# Patient Record
Sex: FEMALE | Race: WHITE | NOT HISPANIC OR LATINO | Employment: UNEMPLOYED | URBAN - METROPOLITAN AREA
[De-identification: names, ages, dates, MRNs, and addresses within clinical notes are randomized per-mention and may not be internally consistent; named-entity substitution may affect disease eponyms.]

---

## 2020-02-04 PROBLEM — S00.12XA CONTUSION OF LEFT PERIOCULAR REGION: Status: ACTIVE | Noted: 2020-02-04

## 2020-02-04 PROBLEM — F90.9 ATTENTION DEFICIT HYPERACTIVITY DISORDER (ADHD): Status: ACTIVE | Noted: 2020-02-04

## 2020-02-04 PROBLEM — F41.9 ANXIETY: Status: ACTIVE | Noted: 2020-02-04

## 2020-02-04 PROBLEM — E03.9 HYPOTHYROIDISM: Status: ACTIVE | Noted: 2020-02-04

## 2020-02-04 PROBLEM — G43.909 MIGRAINE WITHOUT STATUS MIGRAINOSUS, NOT INTRACTABLE: Status: ACTIVE | Noted: 2020-02-04

## 2020-02-04 PROBLEM — R25.1 TREMORS OF NERVOUS SYSTEM: Status: ACTIVE | Noted: 2020-02-04

## 2020-02-04 PROBLEM — F32.A DEPRESSION: Status: ACTIVE | Noted: 2020-02-04

## 2021-02-25 PROBLEM — R53.83 FATIGUE: Chronic | Status: ACTIVE | Noted: 2021-02-25

## 2021-02-25 PROBLEM — R53.1 GENERALIZED WEAKNESS: Status: ACTIVE | Noted: 2021-02-25

## 2021-02-25 PROBLEM — R53.83 FATIGUE: Status: ACTIVE | Noted: 2021-02-25

## 2021-07-12 ENCOUNTER — PATIENT OUTREACH (OUTPATIENT)
Dept: ADMINISTRATIVE | Facility: HOSPITAL | Age: 46
End: 2021-07-12

## 2021-10-14 ENCOUNTER — PATIENT OUTREACH (OUTPATIENT)
Dept: ADMINISTRATIVE | Facility: HOSPITAL | Age: 46
End: 2021-10-14

## 2021-10-28 PROBLEM — M05.742 RHEUMATOID ARTHRITIS INVOLVING BOTH HANDS WITH POSITIVE RHEUMATOID FACTOR: Status: ACTIVE | Noted: 2018-02-21

## 2021-10-28 PROBLEM — S22.081A T12 BURST FRACTURE: Status: ACTIVE | Noted: 2018-06-20

## 2021-10-28 PROBLEM — M05.741 RHEUMATOID ARTHRITIS INVOLVING BOTH HANDS WITH POSITIVE RHEUMATOID FACTOR: Status: ACTIVE | Noted: 2018-02-21

## 2022-04-28 PROBLEM — G25.81 RESTLESS LEGS: Status: ACTIVE | Noted: 2022-02-08

## 2022-04-28 PROBLEM — Z98.84 HISTORY OF BARIATRIC SURGERY: Status: ACTIVE | Noted: 2022-02-08

## 2022-04-28 PROBLEM — E88.819 INSULIN RESISTANCE: Status: ACTIVE | Noted: 2022-03-16

## 2022-04-28 PROBLEM — F50.81 BINGE EATING DISORDER: Status: ACTIVE | Noted: 2022-02-22

## 2022-04-28 PROBLEM — F52.0 LACK OF LIBIDO: Status: ACTIVE | Noted: 2022-03-16

## 2022-04-28 PROBLEM — N95.1 MENOPAUSAL FLUSHING: Status: ACTIVE | Noted: 2022-02-08

## 2022-04-28 PROBLEM — E61.1 IRON DEFICIENCY: Status: ACTIVE | Noted: 2022-02-08

## 2022-04-28 PROBLEM — N20.0 KIDNEY STONE: Status: ACTIVE | Noted: 2022-02-08

## 2022-04-28 PROBLEM — E53.8 VITAMIN B12 DEFICIENCY (NON ANEMIC): Status: ACTIVE | Noted: 2022-02-08

## 2022-04-28 PROBLEM — I10 ESSENTIAL HYPERTENSION: Status: ACTIVE | Noted: 2022-02-22

## 2022-04-28 PROBLEM — G47.00 INSOMNIA DISORDER RELATED TO KNOWN ORGANIC FACTOR: Status: ACTIVE | Noted: 2022-02-08

## 2022-04-28 PROBLEM — R51.9 HEADACHE: Status: ACTIVE | Noted: 2022-02-22

## 2022-04-28 PROBLEM — F50.819 BINGE EATING DISORDER: Status: ACTIVE | Noted: 2022-02-22

## 2022-04-29 PROBLEM — R20.2 NUMBNESS AND TINGLING: Status: ACTIVE | Noted: 2022-04-29

## 2022-04-29 PROBLEM — M54.50 LOW BACK PAIN RADIATING TO BOTH LEGS: Status: ACTIVE | Noted: 2022-04-29

## 2022-04-29 PROBLEM — R20.0 NUMBNESS AND TINGLING: Status: ACTIVE | Noted: 2022-04-29

## 2022-04-29 PROBLEM — M79.604 LOW BACK PAIN RADIATING TO BOTH LEGS: Status: ACTIVE | Noted: 2022-04-29

## 2022-04-29 PROBLEM — M79.605 LOW BACK PAIN RADIATING TO BOTH LEGS: Status: ACTIVE | Noted: 2022-04-29

## 2022-07-27 PROBLEM — M51.36 DEGENERATIVE DISC DISEASE, LUMBAR: Status: ACTIVE | Noted: 2022-03-16

## 2022-07-27 PROBLEM — M51.369 DEGENERATIVE DISC DISEASE, LUMBAR: Status: ACTIVE | Noted: 2022-03-16

## 2022-08-29 DIAGNOSIS — Z12.31 OTHER SCREENING MAMMOGRAM: ICD-10-CM

## 2022-08-31 ENCOUNTER — PATIENT MESSAGE (OUTPATIENT)
Dept: ADMINISTRATIVE | Facility: HOSPITAL | Age: 47
End: 2022-08-31
Payer: MEDICAID

## 2023-10-11 ENCOUNTER — PATIENT OUTREACH (OUTPATIENT)
Dept: ADMINISTRATIVE | Facility: HOSPITAL | Age: 48
End: 2023-10-11
Payer: MEDICAID

## 2023-10-11 ENCOUNTER — PATIENT MESSAGE (OUTPATIENT)
Dept: ADMINISTRATIVE | Facility: HOSPITAL | Age: 48
End: 2023-10-11
Payer: MEDICAID

## 2024-09-23 ENCOUNTER — TELEPHONE (OUTPATIENT)
Dept: OTOLARYNGOLOGY | Facility: CLINIC | Age: 49
End: 2024-09-23
Payer: MEDICAID

## 2024-09-23 NOTE — TELEPHONE ENCOUNTER
----- Message from Nickie Stockton sent at 9/23/2024  4:05 PM CDT -----  Regarding: Appt  Contact: Pt  [phone]  Pt sent a portal request for a appt for plastic surgery on loose skin lift please call

## 2024-09-25 ENCOUNTER — PATIENT MESSAGE (OUTPATIENT)
Dept: ADMINISTRATIVE | Facility: HOSPITAL | Age: 49
End: 2024-09-25
Payer: MEDICAID

## 2024-09-28 ENCOUNTER — PATIENT MESSAGE (OUTPATIENT)
Dept: ADMINISTRATIVE | Facility: OTHER | Age: 49
End: 2024-09-28
Payer: MEDICAID

## 2024-11-06 ENCOUNTER — PATIENT MESSAGE (OUTPATIENT)
Dept: ADMINISTRATIVE | Facility: OTHER | Age: 49
End: 2024-11-06
Payer: MEDICAID

## 2024-11-21 ENCOUNTER — PATIENT MESSAGE (OUTPATIENT)
Dept: ADMINISTRATIVE | Facility: OTHER | Age: 49
End: 2024-11-21
Payer: MEDICAID

## 2024-11-24 PROBLEM — R10.9 ABDOMINAL PAIN: Status: ACTIVE | Noted: 2024-11-24

## 2024-11-24 PROBLEM — E55.9 VITAMIN D DEFICIENCY: Status: ACTIVE | Noted: 2024-11-24

## 2024-11-24 PROBLEM — R11.2 NAUSEA AND VOMITING: Status: ACTIVE | Noted: 2024-11-24

## 2024-11-24 PROBLEM — E16.2 HYPOGLYCEMIA: Status: ACTIVE | Noted: 2024-11-24

## 2024-11-24 PROBLEM — R10.11 RIGHT UPPER QUADRANT ABDOMINAL PAIN: Status: ACTIVE | Noted: 2024-11-24

## 2024-11-24 PROBLEM — G89.29 CHRONIC LOW BACK PAIN: Status: ACTIVE | Noted: 2022-04-29

## 2024-11-24 PROBLEM — R74.01 TRANSAMINITIS: Status: ACTIVE | Noted: 2024-11-24

## 2024-11-25 PROBLEM — E11.9 TYPE 2 DIABETES MELLITUS: Status: ACTIVE | Noted: 2024-11-25

## 2024-11-25 PROBLEM — B96.20 E-COLI UTI: Status: ACTIVE | Noted: 2024-11-25

## 2024-11-25 PROBLEM — N39.0 E-COLI UTI: Status: ACTIVE | Noted: 2024-11-25

## 2024-11-25 PROBLEM — N30.00 ACUTE CYSTITIS WITHOUT HEMATURIA: Status: ACTIVE | Noted: 2024-11-25

## 2024-11-26 PROBLEM — E86.0 DEHYDRATION: Status: ACTIVE | Noted: 2024-11-26

## 2024-11-28 PROBLEM — R07.89 OTHER CHEST PAIN: Status: ACTIVE | Noted: 2024-11-28

## 2024-12-02 ENCOUNTER — TELEPHONE (OUTPATIENT)
Dept: GASTROENTEROLOGY | Facility: CLINIC | Age: 49
End: 2024-12-02
Payer: COMMERCIAL

## 2024-12-02 ENCOUNTER — TELEPHONE (OUTPATIENT)
Dept: PULMONOLOGY | Facility: CLINIC | Age: 49
End: 2024-12-02
Payer: COMMERCIAL

## 2024-12-02 NOTE — TELEPHONE ENCOUNTER
Spoke with patient and rescheduled appointment and changed for an virtual visit for 12/4/24. Patient stated she just got out of the hospital and can't make 5 hour drive to hospital.    ----- Message from Chapis sent at 12/2/2024 12:08 PM CST -----  Pt returning call to reschedule yovany     Confirmed patient's contact info below:  Contact Name: Tayla Lambert  Phone Number: [phone]

## 2024-12-02 NOTE — TELEPHONE ENCOUNTER
Spoke with patient and notified her that [facility] doesn't accept medicaid and will referr her to [facility] that accepts medicaid.

## 2024-12-02 NOTE — TELEPHONE ENCOUNTER
Spoke to pt about appt time pt lives 4 hrs away rescheduled for later in day pt accepted date and time

## 2024-12-02 NOTE — TELEPHONE ENCOUNTER
Left a voicemail for patient about rescheduling appointment. Provided [facility] call back number.     ----- Message from Med Assistant Weiss sent at 12/2/2024 10:17 AM CST -----  Regarding: FW: maria luisa villalba  Contact: @ [phone]    ----- Message -----  From: Chapis Campbell  Sent: 12/2/2024   8:59 AM CST  To: [facility] Clinical Staff  Subject: maria luisa appt                                       Pt is calling to reschedule appointment on 12/04 to a later time she just got out of the hospital and that is a 5 hour drive ...no available dates Pleaes call and adv @ [phone]

## 2024-12-02 NOTE — TELEPHONE ENCOUNTER
----- Message from Lylette sent at 12/2/2024  8:28 AM CST -----  Regarding: Appt  Contact: [phone]  Pt calling regarding appt on 12/3 @8am. Pt states she lives 4 hours away and would like to see if appt time can be pushed back later that day. Would have to leave home at 4am. Please call  [phone]

## 2024-12-03 ENCOUNTER — LAB VISIT (OUTPATIENT)
Dept: LAB | Facility: HOSPITAL | Age: 49
End: 2024-12-03
Attending: INTERNAL MEDICINE
Payer: COMMERCIAL

## 2024-12-03 ENCOUNTER — OFFICE VISIT (OUTPATIENT)
Dept: PULMONOLOGY | Facility: CLINIC | Age: 49
End: 2024-12-03
Payer: COMMERCIAL

## 2024-12-03 VITALS
HEIGHT: 66 IN | SYSTOLIC BLOOD PRESSURE: 122 MMHG | WEIGHT: 157.88 LBS | DIASTOLIC BLOOD PRESSURE: 64 MMHG | OXYGEN SATURATION: 100 % | RESPIRATION RATE: 20 BRPM | HEART RATE: 79 BPM | BODY MASS INDEX: 25.37 KG/M2

## 2024-12-03 DIAGNOSIS — J30.9 ALLERGIC RHINITIS, UNSPECIFIED SEASONALITY, UNSPECIFIED TRIGGER: ICD-10-CM

## 2024-12-03 DIAGNOSIS — J45.30 MILD PERSISTENT ASTHMA, UNCOMPLICATED: ICD-10-CM

## 2024-12-03 DIAGNOSIS — J45.30 MILD PERSISTENT ASTHMA, UNCOMPLICATED: Primary | ICD-10-CM

## 2024-12-03 DIAGNOSIS — M05.9 RHEUMATOID ARTHRITIS, SEROPOSITIVE: ICD-10-CM

## 2024-12-03 DIAGNOSIS — R91.1 LUNG NODULE: ICD-10-CM

## 2024-12-03 LAB — IGE SERPL-ACNC: <35 IU/ML (ref 0–100)

## 2024-12-03 PROCEDURE — 36415 COLL VENOUS BLD VENIPUNCTURE: CPT | Performed by: INTERNAL MEDICINE

## 2024-12-03 PROCEDURE — 1111F DSCHRG MED/CURRENT MED MERGE: CPT | Mod: CPTII,S$GLB,, | Performed by: INTERNAL MEDICINE

## 2024-12-03 PROCEDURE — 3078F DIAST BP <80 MM HG: CPT | Mod: CPTII,S$GLB,, | Performed by: INTERNAL MEDICINE

## 2024-12-03 PROCEDURE — G0009 ADMIN PNEUMOCOCCAL VACCINE: HCPCS | Mod: S$GLB,,, | Performed by: INTERNAL MEDICINE

## 2024-12-03 PROCEDURE — 90677 PCV20 VACCINE IM: CPT | Mod: S$GLB,,, | Performed by: INTERNAL MEDICINE

## 2024-12-03 PROCEDURE — 4010F ACE/ARB THERAPY RXD/TAKEN: CPT | Mod: CPTII,S$GLB,, | Performed by: INTERNAL MEDICINE

## 2024-12-03 PROCEDURE — 99204 OFFICE O/P NEW MOD 45 MIN: CPT | Mod: S$GLB,,, | Performed by: INTERNAL MEDICINE

## 2024-12-03 PROCEDURE — 3044F HG A1C LEVEL LT 7.0%: CPT | Mod: CPTII,S$GLB,, | Performed by: INTERNAL MEDICINE

## 2024-12-03 PROCEDURE — 3061F NEG MICROALBUMINURIA REV: CPT | Mod: CPTII,S$GLB,, | Performed by: INTERNAL MEDICINE

## 2024-12-03 PROCEDURE — 3008F BODY MASS INDEX DOCD: CPT | Mod: CPTII,S$GLB,, | Performed by: INTERNAL MEDICINE

## 2024-12-03 PROCEDURE — 82785 ASSAY OF IGE: CPT | Performed by: INTERNAL MEDICINE

## 2024-12-03 PROCEDURE — 3074F SYST BP LT 130 MM HG: CPT | Mod: CPTII,S$GLB,, | Performed by: INTERNAL MEDICINE

## 2024-12-03 PROCEDURE — 1159F MED LIST DOCD IN RCRD: CPT | Mod: CPTII,S$GLB,, | Performed by: INTERNAL MEDICINE

## 2024-12-03 PROCEDURE — 99999 PR PBB SHADOW E&M-EST. PATIENT-LVL V: CPT | Mod: PBBFAC,,, | Performed by: INTERNAL MEDICINE

## 2024-12-03 PROCEDURE — 3066F NEPHROPATHY DOC TX: CPT | Mod: CPTII,S$GLB,, | Performed by: INTERNAL MEDICINE

## 2024-12-03 RX ORDER — PERMETHRIN 50 MG/G
1 CREAM TOPICAL ONCE
Status: ON HOLD | COMMUNITY
Start: 2024-09-03

## 2024-12-03 RX ORDER — DICYCLOMINE HYDROCHLORIDE 20 MG/1
20 TABLET ORAL
Status: ON HOLD | COMMUNITY
Start: 2024-11-15

## 2024-12-03 RX ORDER — TRAMADOL HYDROCHLORIDE 50 MG/1
50 TABLET ORAL EVERY 8 HOURS PRN
Status: ON HOLD | COMMUNITY
Start: 2024-09-27

## 2024-12-03 RX ORDER — ALBUTEROL SULFATE 90 UG/1
2 INHALANT RESPIRATORY (INHALATION) EVERY 6 HOURS PRN
Qty: 18 G | Refills: 5 | Status: ON HOLD | OUTPATIENT
Start: 2024-12-03

## 2024-12-03 RX ORDER — CETIRIZINE HYDROCHLORIDE 10 MG/1
10 TABLET ORAL DAILY
Qty: 30 TABLET | Refills: 2 | Status: ON HOLD | OUTPATIENT
Start: 2024-12-03 | End: 2025-03-03

## 2024-12-03 RX ORDER — AZELASTINE HCL 205.5 UG/1
1 SPRAY NASAL DAILY
Qty: 30 ML | Refills: 2 | Status: ON HOLD | OUTPATIENT
Start: 2024-12-03

## 2024-12-03 RX ORDER — FLUTICASONE PROPIONATE 50 MCG
2 SPRAY, SUSPENSION (ML) NASAL DAILY
Qty: 16 G | Refills: 11 | Status: ON HOLD | OUTPATIENT
Start: 2024-12-03

## 2024-12-03 NOTE — TELEPHONE ENCOUNTER
MA called/spoke with patient in regards to scheduling an appointment for her abdominal pain. Offered appointment for today with Dr. Ibarra, patient declined due to having another appointment in [place]. Offered appointment with Dr. Whaley for tomorrow morning, patient accepted/appointment scheduled.

## 2024-12-03 NOTE — PROGRESS NOTES
Initial Outpatient Pulmonary Evaluation       SUBJECTIVE:     Chief Complaint   Patient presents with    Pulmonary Nodules       History of Present Illness:    Patient is a 49 y.o. female presenting for evaluation of 4 mm right lung nodule that was detected on CT abdomen pelvis 11/23/2024 and was deemed to be stable when compared to prior CT abdomen pelvis 11/26/21.    History of asthma on albuterol p.r.n..  Coughing wheezing reported.      History of seropositive rheumatoid arthritis as per chart review.      Only on Neurontin for pain.          Review of Systems   Respiratory:  Positive for cough, wheezing and use of rescue inhaler.    Musculoskeletal:  Positive for arthralgias.       Review of patient's allergies indicates:  No Known Allergies    Current Outpatient Medications   Medication Sig Dispense Refill    dicyclomine (BENTYL) 20 mg tablet Take 20 mg by mouth.      permethrin (ELIMITE) 5 % cream Apply 1 application  topically once.      traMADoL (ULTRAM) 50 mg tablet Take 50 mg by mouth every 8 (eight) hours as needed.      acetaminophen-codeine 300-30mg (TYLENOL #3) 300-30 mg Tab Take 1 tablet by mouth 2 (two) times daily. 40 tablet 0    albuterol (PROVENTIL/VENTOLIN HFA) 90 mcg/actuation inhaler Inhale 2 puffs into the lungs every 6 (six) hours as needed for Wheezing. Rescue 18 g 5    azelastine 205.5 mcg (0.15 %) Spry 1 spray by Nasal route once daily. 30 mL 2    cariprazine (VRAYLAR) 1.5 mg Cap Take 1 capsule (1.5 mg total) by mouth every evening. 30 capsule 1    cetirizine (ZYRTEC) 10 MG tablet Take 1 tablet (10 mg total) by mouth once daily. 30 tablet 2    cyanocobalamin 1,000 mcg/mL injection Inject 1 mL (1,000 mcg total) into the muscle every 30 days. Inject 1 mL into the muscle every month 3 mL 3    diclofenac (VOLTAREN) 75 MG EC tablet Take 75 mg by mouth once daily.      divalproex ER (DEPAKOTE ER) 250 MG 24 hr tablet Take 1 tablet (250 mg total) by  mouth once daily. 30 tablet 0    ergocalciferol (VITAMIN D2) 50,000 unit Cap Take 50,000 Units by mouth every 7 days.      estradioL (ESTRACE) 1 MG tablet Take 1 tablet (1 mg total) by mouth once daily. 30 tablet 2    FLUoxetine 20 MG capsule Take 1 capsule (20 mg total) by mouth once daily. 30 capsule 1    fluticasone propionate (FLONASE) 50 mcg/actuation nasal spray 2 sprays (100 mcg total) by Each Nostril route once daily. 16 g 11    gabapentin (NEURONTIN) 300 MG capsule Take 1 capsule (300 mg total) by mouth 3 (three) times daily. 90 capsule 1    hydrOXYzine (ATARAX) 50 MG tablet Take 1 tablet (50 mg total) by mouth 3 (three) times daily as needed for Anxiety. 90 tablet 2    levothyroxine (SYNTHROID) 125 MCG tablet Take 1 tablet (125 mcg total) by mouth before breakfast. 30 tablet 0    lurasidone (LATUDA) 60 mg Tab tablet Take 1 tablet (60 mg total) by mouth once daily. 30 tablet 1    meclizine (ANTIVERT) 50 MG tablet Take 25 mg by mouth every 8 (eight) hours.      olmesartan (BENICAR) 40 MG tablet Take 1 tablet (40 mg total) by mouth once daily. 90 tablet 3    pantoprazole (PROTONIX) 40 MG tablet Take 1 tablet (40 mg total) by mouth once daily. 30 tablet 2    sucralfate (CARAFATE) 1 gram tablet Take 1 tablet (1 g total) by mouth 4 (four) times daily as needed (indigestion/acid reflux). 120 tablet 3    sumatriptan (IMITREX) 100 MG tablet Take 1 tablet (100 mg total) by mouth every 2 (two) hours as needed for Migraine. Do need exceed 2 tablets in 24 hours.      tamsulosin (FLOMAX) 0.4 mg Cap Take 1 capsule (0.4 mg total) by mouth once daily. 30 capsule 11    temazepam (RESTORIL) 15 mg Cap Take 1 capsule (15 mg total) by mouth nightly as needed (insomnia). 30 capsule 0    tirzepatide (MOUNJARO) 15 mg/0.5 mL PnIj Inject 15 mg into the skin every 7 days. 2 mL 5    tiZANidine (ZANAFLEX) 4 MG tablet Take 1 tablet (4 mg total) by mouth every evening. 30 tablet 0    topiramate (TOPAMAX) 200 MG Tab Take 1 tablet (200 mg  total) by mouth 2 (two) times daily. 180 tablet 1    triamcinolone acetonide 0.1% (KENALOG) 0.1 % ointment Apply topically 2 (two) times daily. for 14 days 80 g 1    TRINTELLIX 20 mg Tab Take 1 tablet (20 mg total) by mouth every evening. 30 tablet 2    valACYclovir (VALTREX) 1000 MG tablet Take 1 tablet (1,000 mg total) by mouth 2 (two) times daily. For 5 days with outbreaks. 60 tablet 2    VYVANSE 70 mg capsule Take 1 capsule (70 mg total) by mouth every morning. 30 capsule 0    zinc oxide 10 % Oint Apply 1 Application topically 2 (two) times a day.       No current facility-administered medications for this visit.       Past Medical History:   Diagnosis Date    ADHD (attention deficit hyperactivity disorder)     Anxiety     Asthma     Depression     Diabetes mellitus     Encounter for blood transfusion     Essential hypertension 2022    Hypothyroidism 2020    Kidney stone 2022    Migraine without status migrainosus, not intractable 2020    Rheumatoid arthritis involving both hands with positive rheumatoid factor 2018    T12 burst fracture     Tremors of nervous system 2020     Past Surgical History:   Procedure Laterality Date    ABDOMINAL SURGERY      APPENDECTOMY      BACK SURGERY  2018    T11 to L1 PSF w/T12 laminectomy     SECTION      CHOLECYSTECTOMY      GASTRIC BYPASS      HYSTERECTOMY      TONSILLECTOMY       Family History   Problem Relation Name Age of Onset    Diabetes Mother      Tremor Father      Hypertension Father      Breast cancer Maternal Aunt      Breast cancer Maternal Aunt      Breast cancer Maternal Grandmother      Tremor Brother       Social History     Tobacco Use    Smoking status: Never    Smokeless tobacco: Never   Substance Use Topics    Alcohol use: Not Currently    Drug use: Never          OBJECTIVE:     Vital Signs (Most Recent)  Vital Signs  Pulse: 79  Resp: 20  SpO2: 100 %  BP: 122/64  Pain Score:   5  Pain Loc:  "Chest  Height and Weight  Height: 5' 5.5" (166.4 cm)  Weight: 71.6 kg (157 lb 13.6 oz)  BSA (Calculated - sq m): 1.82 sq meters  BMI (Calculated): 25.9  Weight in (lb) to have BMI = 25: 152.2]  Wt Readings from Last 2 Encounters:   12/03/24 71.6 kg (157 lb 13.6 oz)   11/25/24 69.9 kg (154 lb)         Physical Exam:  Physical Exam   Constitutional: She appears well-developed and well-nourished.   Pulmonary/Chest: Effort normal. She has wheezes.   Few scattered and expiratory wheezes       Laboratory  Lab Results   Component Value Date    WBC 4.55 11/29/2024    RBC 3.80 (L) 11/29/2024    HGB 12.2 11/29/2024    HCT 36.9 (L) 11/29/2024    MCV 97 11/29/2024    MCH 32.1 11/29/2024    MCHC 33.1 11/29/2024    RDW 15.0 (H) 11/29/2024     11/29/2024    MPV 9.6 11/29/2024    GRAN 3.9 02/23/2024    GRAN 67.3 02/23/2024    LYMPH 44.2 11/29/2024    LYMPH 2.01 11/29/2024    MONO 8.6 11/29/2024    MONO 0.39 11/29/2024    EOS 2.0 11/29/2024    EOS 0.09 11/29/2024    BASO 0.04 02/23/2024    EOSINOPHIL 2.0 11/24/2024    BASOPHIL 0.9 11/29/2024    BASOPHIL 0.04 11/29/2024       BMP  Lab Results   Component Value Date     11/29/2024    K 3.7 11/29/2024     (H) 11/29/2024    CO2 23 11/29/2024    BUN 10 11/29/2024    CREATININE 0.8 11/29/2024    CALCIUM 8.6 (L) 11/29/2024    ANIONGAP 7 (L) 11/29/2024    ESTGFRAFRICA >60.0 01/05/2022    EGFRNONAA >60.0 01/05/2022    AST 92 (H) 11/29/2024     (H) 11/29/2024    PROT 7.2 02/23/2024       No results found for: "BNP"    Lab Results   Component Value Date    TSH 0.599 11/04/2024       Lab Results   Component Value Date    SEDRATE 10 11/24/2024       No results found for: "CRP"    No results found for: "IGE"    No results found for: "ASPERGILLUS"  No results found for: "AFUMIGATUSCL"     No results found for: "ACE"    Diagnostic Results:  I have personally reviewed today the following studies :       CT abdomen and pelvis November 2024   EXAMINATION:  CT ABDOMEN PELVIS " WITH IV CONTRAST     CLINICAL HISTORY:  Nausea/vomiting;Abdominal pain, acute, nonlocalized;Hx of gastric bypass with recurrenat nausea and vomiting;     TECHNIQUE:  Images from the lung bases to the ischial tuberosities were acquired after administration of 100cc of non-ionic iodine IV contrast material. Sagittal and Coronal multiplanar reconstructions (MPR) were performed and pushed to PACS.     Suboptimal assessment of the GI tract due to lack of p.o. contrast material and luminal distension.     COMPARISON:  Ultrasound abdomen dated 11/08/2024     CT abdomen and pelvis dated 11/26/2021     FINDINGS:  Lower chest: No evidence of pleural effusion or pneumothorax.  Stable right lower lobe subpleural nodule measuring approximately 3 mm (series 2, imaging 32).     Liver: Normal.     Gallbladder and biliary tree: Cholecystectomy.No intrahepatic biliary ductal dilation.  CBD measuring 1.3 cm.     Spleen: A linear lucent line crossing the spleen (series 2, image 38; series 601, image 83).     Pancreas: Normal.     Adrenals: Normal.     Kidneys and ureters: No evidence of nephrolithiasis or hydronephrosis.     Bowel: Redemonstration of several surgical clips and suture material.  No evidence of mechanical bowel obstruction.  Moderate stool within the colon.  Appendix is not distended.     Vessels: No significant atheromatous disease is noted in the aorta and its major collateral branches.     Lymph nodes: No mesenteric, retroperitoneal or pelvic lymphadenopathy is noted.     Peritoneum: No ascites or free air.     Abdominal wall: Prior midline incision.  No evidence of bowel herniation.  Body wall edema.     Reproductive organs: Hysterectomy.  No pelvic masses.     Bladder: Distended with no evidence of intraluminal stones or masses.     Bones: Known L1 and L2 laminectomies with placement of bilateral pedicle screws and rods at T12-L2 across a remote L1 burst fracture; Hardware is intact.  Multilevel vacuum phenomena.   Dextroscoliosis of the lumbar spine.        Impression:     1. A linear lucent line crossing the spleen could be congenital, versus remote infarct or trauma.  Please correlate with physical examination.  2. Otherwise, no CT evidence of acute abdominal abnormality is seen.  3. Known postoperative changes within the abdomen and pelvis.  No evidence of mechanical bowel obstruction.  4. Anasarca.  5. Constipation.  6. Additional findings above.      ASSESSMENT/PLAN:     Mild persistent asthma, uncomplicated  -     Complete PFT with bronchodilator; Future; Expected date: 2025  -     Fraction of  Nitric Oxide; Future; Expected date: 2025  -     IgE; Future; Expected date: 2024  -     albuterol (PROVENTIL/VENTOLIN HFA) 90 mcg/actuation inhaler; Inhale 2 puffs into the lungs every 6 (six) hours as needed for Wheezing. Rescue  Dispense: 18 g; Refill: 5  -     (VFC) PCV20 (Prevnar 20) IM vaccine (>/= 6 wks)    Lung nodule  -     Ambulatory referral/consult to Pulmonology    Allergic rhinitis, unspecified seasonality, unspecified trigger  -     Fraction of  Nitric Oxide; Future; Expected date: 2025  -     IgE; Future; Expected date: 2024  -     cetirizine (ZYRTEC) 10 MG tablet; Take 1 tablet (10 mg total) by mouth once daily.  Dispense: 30 tablet; Refill: 2  -     fluticasone propionate (FLONASE) 50 mcg/actuation nasal spray; 2 sprays (100 mcg total) by Each Nostril route once daily.  Dispense: 16 g; Refill: 11  -     azelastine 205.5 mcg (0.15 %) Spry; 1 spray by Nasal route once daily.  Dispense: 30 mL; Refill: 2    Rheumatoid arthritis, seropositive  -     Ambulatory referral/consult to Rheumatology; Future; Expected date: 12/10/2024  -     Ambulatory referral/consult to Internal Medicine; Future; Expected date: 12/10/2024      Continue albuterol p.r.n.     Check IgE fraction  nitric oxide and PFT with bronchodilator.      Assess for ICS therapy need.      Internal medicine  referral and rheumatology referral.      Lung nodule 4 mm stable since 2021 low risk patient no history of cancer and never smoker at the moment no need for surveillance.    Follow up in about 6 weeks (around 1/14/2025).    This note was prepared using voice recognition system and is likely to have sound alike errors that may have been overlooked even after proof reading.  Please call me with any questions    Discussed diagnosis, its evaluation, treatment and usual course. All questions answered.    Thank you for the courtesy of participating in the care of this patient    Casper Sanchez MD

## 2024-12-04 ENCOUNTER — PATIENT MESSAGE (OUTPATIENT)
Dept: PULMONOLOGY | Facility: CLINIC | Age: 49
End: 2024-12-04
Payer: COMMERCIAL

## 2024-12-04 ENCOUNTER — OFFICE VISIT (OUTPATIENT)
Dept: HEPATOLOGY | Facility: CLINIC | Age: 49
End: 2024-12-04
Payer: MEDICAID

## 2024-12-04 ENCOUNTER — TELEPHONE (OUTPATIENT)
Dept: HEPATOLOGY | Facility: CLINIC | Age: 49
End: 2024-12-04

## 2024-12-04 DIAGNOSIS — R79.89 ABNORMAL LFTS: Primary | ICD-10-CM

## 2024-12-04 DIAGNOSIS — Z98.84 HISTORY OF GASTRIC BYPASS: ICD-10-CM

## 2024-12-04 DIAGNOSIS — E11.9 TYPE 2 DIABETES MELLITUS WITHOUT COMPLICATION, WITHOUT LONG-TERM CURRENT USE OF INSULIN: ICD-10-CM

## 2024-12-04 PROCEDURE — 3066F NEPHROPATHY DOC TX: CPT | Mod: CPTII,95,, | Performed by: INTERNAL MEDICINE

## 2024-12-04 PROCEDURE — 1159F MED LIST DOCD IN RCRD: CPT | Mod: CPTII,95,, | Performed by: INTERNAL MEDICINE

## 2024-12-04 PROCEDURE — 1160F RVW MEDS BY RX/DR IN RCRD: CPT | Mod: CPTII,95,, | Performed by: INTERNAL MEDICINE

## 2024-12-04 PROCEDURE — 3061F NEG MICROALBUMINURIA REV: CPT | Mod: CPTII,95,, | Performed by: INTERNAL MEDICINE

## 2024-12-04 PROCEDURE — 3044F HG A1C LEVEL LT 7.0%: CPT | Mod: CPTII,95,, | Performed by: INTERNAL MEDICINE

## 2024-12-04 PROCEDURE — 1111F DSCHRG MED/CURRENT MED MERGE: CPT | Mod: CPTII,95,, | Performed by: INTERNAL MEDICINE

## 2024-12-04 PROCEDURE — 99205 OFFICE O/P NEW HI 60 MIN: CPT | Mod: 95,,, | Performed by: INTERNAL MEDICINE

## 2024-12-04 PROCEDURE — 4010F ACE/ARB THERAPY RXD/TAKEN: CPT | Mod: CPTII,95,, | Performed by: INTERNAL MEDICINE

## 2024-12-04 NOTE — TELEPHONE ENCOUNTER
Call the patient to let her know that her insurance is out of network.  Pre service number was given .   Pt verbalized understood the message.  Left call back .

## 2024-12-04 NOTE — TELEPHONE ENCOUNTER
----- Message from Alfonso Pete MD sent at 12/4/2024  2:42 PM CST -----  Labs and MRCP please and clinic in 3 months in person
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
## 2024-12-05 ENCOUNTER — TELEPHONE (OUTPATIENT)
Dept: HEPATOLOGY | Facility: CLINIC | Age: 49
End: 2024-12-05
Payer: MEDICAID

## 2024-12-05 NOTE — TELEPHONE ENCOUNTER
Spoke with patient.  Her abdominal pain is increased. She feels like she is about to faint.  Per Dr Pete,   Patient can go to the ER.  Patient agree to go to the ER.

## 2024-12-06 ENCOUNTER — HOSPITAL ENCOUNTER (INPATIENT)
Facility: HOSPITAL | Age: 49
LOS: 3 days | Discharge: HOME | DRG: 638 | End: 2024-12-11
Attending: EMERGENCY MEDICINE | Admitting: INTERNAL MEDICINE
Payer: COMMERCIAL

## 2024-12-06 DIAGNOSIS — Z98.84 HISTORY OF ROUX-EN-Y GASTRIC BYPASS: ICD-10-CM

## 2024-12-06 DIAGNOSIS — K29.70 GASTRITIS, PRESENCE OF BLEEDING UNSPECIFIED, UNSPECIFIED CHRONICITY, UNSPECIFIED GASTRITIS TYPE: ICD-10-CM

## 2024-12-06 DIAGNOSIS — E16.2 HYPOGLYCEMIA: ICD-10-CM

## 2024-12-06 DIAGNOSIS — R53.81 DEBILITY: ICD-10-CM

## 2024-12-06 DIAGNOSIS — R07.9 CHEST PAIN: ICD-10-CM

## 2024-12-06 DIAGNOSIS — R10.11 RIGHT UPPER QUADRANT ABDOMINAL PAIN: Primary | ICD-10-CM

## 2024-12-06 DIAGNOSIS — I95.9 HYPOTENSION, UNSPECIFIED HYPOTENSION TYPE: ICD-10-CM

## 2024-12-06 LAB
ALBUMIN SERPL BCP-MCNC: 3.6 G/DL (ref 3.5–5.2)
ALP SERPL-CCNC: 150 U/L (ref 40–150)
ALT SERPL W/O P-5'-P-CCNC: 72 U/L (ref 10–44)
ANION GAP SERPL CALC-SCNC: 8 MMOL/L (ref 8–16)
AST SERPL-CCNC: 30 U/L (ref 10–40)
B-HCG UR QL: NEGATIVE
BASOPHILS # BLD AUTO: 0.05 K/UL (ref 0–0.2)
BASOPHILS NFR BLD: 1.3 % (ref 0–1.9)
BILIRUB SERPL-MCNC: 0.2 MG/DL (ref 0.1–1)
BILIRUB UR QL STRIP: NEGATIVE
BUN SERPL-MCNC: 13 MG/DL (ref 6–20)
CALCIUM SERPL-MCNC: 9 MG/DL (ref 8.7–10.5)
CHLORIDE SERPL-SCNC: 113 MMOL/L (ref 95–110)
CLARITY UR REFRACT.AUTO: CLEAR
CO2 SERPL-SCNC: 19 MMOL/L (ref 23–29)
COLOR UR AUTO: COLORLESS
CREAT SERPL-MCNC: 0.8 MG/DL (ref 0.5–1.4)
CTP QC/QA: YES
DIFFERENTIAL METHOD BLD: ABNORMAL
EOSINOPHIL # BLD AUTO: 0.2 K/UL (ref 0–0.5)
EOSINOPHIL NFR BLD: 4.9 % (ref 0–8)
ERYTHROCYTE [DISTWIDTH] IN BLOOD BY AUTOMATED COUNT: 14.8 % (ref 11.5–14.5)
EST. GFR  (NO RACE VARIABLE): >60 ML/MIN/1.73 M^2
GLUCOSE SERPL-MCNC: 77 MG/DL (ref 70–110)
GLUCOSE UR QL STRIP: NEGATIVE
HCT VFR BLD AUTO: 39 % (ref 37–48.5)
HGB BLD-MCNC: 12.7 G/DL (ref 12–16)
HGB UR QL STRIP: NEGATIVE
IMM GRANULOCYTES # BLD AUTO: 0.01 K/UL (ref 0–0.04)
IMM GRANULOCYTES NFR BLD AUTO: 0.3 % (ref 0–0.5)
KETONES UR QL STRIP: NEGATIVE
LEUKOCYTE ESTERASE UR QL STRIP: NEGATIVE
LIPASE SERPL-CCNC: 11 U/L (ref 4–60)
LYMPHOCYTES # BLD AUTO: 1.4 K/UL (ref 1–4.8)
LYMPHOCYTES NFR BLD: 35.1 % (ref 18–48)
MAGNESIUM SERPL-MCNC: 2.3 MG/DL (ref 1.6–2.6)
MCH RBC QN AUTO: 32.8 PG (ref 27–31)
MCHC RBC AUTO-ENTMCNC: 32.6 G/DL (ref 32–36)
MCV RBC AUTO: 101 FL (ref 82–98)
MONOCYTES # BLD AUTO: 0.4 K/UL (ref 0.3–1)
MONOCYTES NFR BLD: 10.8 % (ref 4–15)
NEUTROPHILS # BLD AUTO: 1.9 K/UL (ref 1.8–7.7)
NEUTROPHILS NFR BLD: 47.6 % (ref 38–73)
NITRITE UR QL STRIP: NEGATIVE
NRBC BLD-RTO: 0 /100 WBC
PH UR STRIP: 7 [PH] (ref 5–8)
PLATELET # BLD AUTO: 249 K/UL (ref 150–450)
PLATELET BLD QL SMEAR: ABNORMAL
PMV BLD AUTO: 10.6 FL (ref 9.2–12.9)
POCT GLUCOSE: 115 MG/DL (ref 70–110)
POCT GLUCOSE: 46 MG/DL (ref 70–110)
POCT GLUCOSE: 47 MG/DL (ref 70–110)
POCT GLUCOSE: 52 MG/DL (ref 70–110)
POCT GLUCOSE: 58 MG/DL (ref 70–110)
POCT GLUCOSE: 79 MG/DL (ref 70–110)
POTASSIUM SERPL-SCNC: 3.8 MMOL/L (ref 3.5–5.1)
PROT SERPL-MCNC: 7.1 G/DL (ref 6–8.4)
PROT UR QL STRIP: NEGATIVE
RBC # BLD AUTO: 3.87 M/UL (ref 4–5.4)
SODIUM SERPL-SCNC: 140 MMOL/L (ref 136–145)
SP GR UR STRIP: 1 (ref 1–1.03)
TOXIC GRANULES BLD QL SMEAR: PRESENT
TROPONIN I SERPL DL<=0.01 NG/ML-MCNC: <3 NG/L (ref 0–14)
URN SPEC COLLECT METH UR: ABNORMAL
WBC # BLD AUTO: 3.88 K/UL (ref 3.9–12.7)
WBC TOXIC VACUOLES BLD QL SMEAR: PRESENT

## 2024-12-06 PROCEDURE — 25000003 PHARM REV CODE 250: Performed by: EMERGENCY MEDICINE

## 2024-12-06 PROCEDURE — 25500020 PHARM REV CODE 255: Performed by: EMERGENCY MEDICINE

## 2024-12-06 PROCEDURE — 96366 THER/PROPH/DIAG IV INF ADDON: CPT

## 2024-12-06 PROCEDURE — 93010 ELECTROCARDIOGRAM REPORT: CPT | Mod: ,,, | Performed by: INTERNAL MEDICINE

## 2024-12-06 PROCEDURE — 81003 URINALYSIS AUTO W/O SCOPE: CPT | Performed by: EMERGENCY MEDICINE

## 2024-12-06 PROCEDURE — 82962 GLUCOSE BLOOD TEST: CPT

## 2024-12-06 PROCEDURE — G0378 HOSPITAL OBSERVATION PER HR: HCPCS

## 2024-12-06 PROCEDURE — 83690 ASSAY OF LIPASE: CPT | Performed by: EMERGENCY MEDICINE

## 2024-12-06 PROCEDURE — 81025 URINE PREGNANCY TEST: CPT | Performed by: EMERGENCY MEDICINE

## 2024-12-06 PROCEDURE — 63600175 PHARM REV CODE 636 W HCPCS: Performed by: EMERGENCY MEDICINE

## 2024-12-06 PROCEDURE — 84484 ASSAY OF TROPONIN QUANT: CPT | Performed by: EMERGENCY MEDICINE

## 2024-12-06 PROCEDURE — 93005 ELECTROCARDIOGRAM TRACING: CPT

## 2024-12-06 PROCEDURE — 99285 EMERGENCY DEPT VISIT HI MDM: CPT | Mod: 25

## 2024-12-06 PROCEDURE — 96361 HYDRATE IV INFUSION ADD-ON: CPT

## 2024-12-06 PROCEDURE — 85025 COMPLETE CBC W/AUTO DIFF WBC: CPT | Performed by: EMERGENCY MEDICINE

## 2024-12-06 PROCEDURE — 96365 THER/PROPH/DIAG IV INF INIT: CPT

## 2024-12-06 PROCEDURE — 80053 COMPREHEN METABOLIC PANEL: CPT | Performed by: EMERGENCY MEDICINE

## 2024-12-06 PROCEDURE — 83735 ASSAY OF MAGNESIUM: CPT | Performed by: EMERGENCY MEDICINE

## 2024-12-06 PROCEDURE — 96374 THER/PROPH/DIAG INJ IV PUSH: CPT | Mod: 59

## 2024-12-06 RX ORDER — HYDROMORPHONE HYDROCHLORIDE 1 MG/ML
0.5 INJECTION, SOLUTION INTRAMUSCULAR; INTRAVENOUS; SUBCUTANEOUS
Status: COMPLETED | OUTPATIENT
Start: 2024-12-06 | End: 2024-12-06

## 2024-12-06 RX ORDER — IBUPROFEN 200 MG
8 TABLET ORAL
Status: DISCONTINUED | OUTPATIENT
Start: 2024-12-06 | End: 2024-12-09

## 2024-12-06 RX ORDER — DEXTROSE MONOHYDRATE 100 MG/ML
INJECTION, SOLUTION INTRAVENOUS
Status: COMPLETED | OUTPATIENT
Start: 2024-12-06 | End: 2024-12-07

## 2024-12-06 RX ORDER — IBUPROFEN 200 MG
8 TABLET ORAL ONCE
Status: COMPLETED | OUTPATIENT
Start: 2024-12-06 | End: 2024-12-06

## 2024-12-06 RX ADMIN — IOHEXOL 75 ML: 350 INJECTION, SOLUTION INTRAVENOUS at 09:12

## 2024-12-06 RX ADMIN — IOHEXOL 30 ML: 350 INJECTION, SOLUTION INTRAVENOUS at 09:12

## 2024-12-06 RX ADMIN — DEXTROSE MONOHYDRATE: 100 INJECTION, SOLUTION INTRAVENOUS at 11:12

## 2024-12-06 RX ADMIN — DEXTROSE MONOHYDRATE 500 ML: 100 INJECTION, SOLUTION INTRAVENOUS at 07:12

## 2024-12-06 RX ADMIN — SODIUM CHLORIDE 1000 ML: 9 INJECTION, SOLUTION INTRAVENOUS at 06:12

## 2024-12-06 RX ADMIN — Medication 8 G: at 07:12

## 2024-12-06 RX ADMIN — DEXTROSE MONOHYDRATE 250 ML: 100 INJECTION, SOLUTION INTRAVENOUS at 10:12

## 2024-12-06 RX ADMIN — HYDROMORPHONE HYDROCHLORIDE 0.5 MG: 1 INJECTION, SOLUTION INTRAMUSCULAR; INTRAVENOUS; SUBCUTANEOUS at 06:12

## 2024-12-06 RX ADMIN — Medication 8 G: at 10:12

## 2024-12-06 RX ADMIN — SODIUM CHLORIDE 1000 ML: 9 INJECTION, SOLUTION INTRAVENOUS at 11:12

## 2024-12-06 NOTE — ED NOTES
Pt identifiers Tayla DANTE Lambert were checked and are correct  LOC: The patient is awake, alert, aware of environment with an appropriate affect. Oriented x4, speaking appropriately  APPEARANCE: Pt rates right side abd pain an 8/10 , in no acute distress, pt is clean and well groomed, clothing properly fastened  SKIN: Skin warm, dry and intact, normal skin turgor, moist mucus membranes  RESPIRATORY: Airway is open and patent, respirations are spontaneous, even and unlabored, normal effort and rate  CARDIAC: Normal rate and rhythm, no peripheral edema noted, capillary refill < 3 seconds, bilateral radial pulses 2+  ABDOMEN: Soft, nontender, nondistended. Bowel sounds present   NEUROLOGIC: PERRL, facial expression is symmetrical, patient moving all extremities spontaneously, normal sensation in all extremities when touched with a finger.  Follows all commands appropriately  MUSCULOSKELETAL: No obvious deformities.

## 2024-12-06 NOTE — FIRST PROVIDER EVALUATION
Medical screening examination initiated.  I have conducted a focused provider triage encounter, findings are as follows:    Brief history of present illness:  ***    There were no vitals filed for this visit.    Pertinent physical exam:  ***    Brief workup plan:  ***    Preliminary workup initiated; this workup will be continued and followed by the physician or advanced practice provider that is assigned to the patient when roomed.

## 2024-12-06 NOTE — FIRST PROVIDER EVALUATION
Medical screening examination initiated.  I have conducted a focused provider triage encounter, findings are as follows:    Brief history of present illness:  Worsening right sided abdominal pain     There were no vitals filed for this visit.    Pertinent physical exam:  right sided abdominal pain on exam    Brief workup plan:  Abdominal labs and CT    Preliminary workup initiated; this workup will be continued and followed by the physician or advanced practice provider that is assigned to the patient when roomed.

## 2024-12-06 NOTE — ED PROVIDER NOTES
Encounter Date: 2024       History     Chief Complaint   Patient presents with    Abdominal Pain     Sent by primary for right sided abdominal pain, patient says she has not been eating or drinking, patient endorses constipation, patient says something is wrong with her liver and controlling her sugars, poor historian      49-year-old female status post gastric bypass in  presents with right upper quadrant abdominal pain.  She was recently admitted to the [facility] for 9 days.  She had elevated LFTs.  She has been seen in person by surgery.  She has had a virtual GI consult.  They recommended MRCP.  She has persistent pain.  Her symptoms have been going on for 2 years.  Worse in the last month.  She has lost 150 lb.  She denies nausea, vomiting, diarrhea, fever, cough, shortness of breath, chest pain, or dysuria.  The patients available PMH, PSH, Social History, medications, allergies, and triage vital signs were reviewed just prior to their medical evaluation.         Review of patient's allergies indicates:  No Known Allergies  Past Medical History:   Diagnosis Date    ADHD (attention deficit hyperactivity disorder)     Anxiety     Asthma     Depression     Diabetes mellitus     Encounter for blood transfusion     Essential hypertension 2022    Hypothyroidism 2020    Kidney stone 2022    Migraine without status migrainosus, not intractable 2020    Rheumatoid arthritis involving both hands with positive rheumatoid factor 2018    T12 burst fracture     Tremors of nervous system 2020     Past Surgical History:   Procedure Laterality Date    ABDOMINAL SURGERY      APPENDECTOMY      BACK SURGERY  2018    T11 to L1 PSF w/T12 laminectomy     SECTION      CHOLECYSTECTOMY      GASTRIC BYPASS      HYSTERECTOMY      TONSILLECTOMY       Family History   Problem Relation Name Age of Onset    Diabetes Mother      Tremor Father      Hypertension Father       Breast cancer Maternal Aunt      Breast cancer Maternal Aunt      Breast cancer Maternal Grandmother      Tremor Brother       Social History     Tobacco Use    Smoking status: Never    Smokeless tobacco: Never   Substance Use Topics    Alcohol use: Not Currently    Drug use: Never     Review of Systems   Constitutional:  Positive for unexpected weight change. Negative for fever.   Respiratory:  Negative for cough and shortness of breath.    Cardiovascular:  Negative for chest pain.   Gastrointestinal:  Positive for abdominal pain. Negative for diarrhea, nausea and vomiting.   Genitourinary:  Negative for dysuria.       Physical Exam     Initial Vitals [12/06/24 1519]   BP Pulse Resp Temp SpO2   (!) 123/58 82 20 97.4 °F (36.3 °C) 100 %      MAP       --         Physical Exam    Nursing note and vitals reviewed.  Constitutional: She appears well-developed and well-nourished. She is not diaphoretic. No distress.   HENT:   Head: Normocephalic and atraumatic.   Nose: Nose normal.   Eyes: Conjunctivae are normal. Right eye exhibits no discharge. Left eye exhibits no discharge.   Neck: Neck supple.   Normal range of motion.  Cardiovascular:  Normal rate, regular rhythm and normal heart sounds.     Exam reveals no gallop and no friction rub.       No murmur heard.  Pulmonary/Chest: Breath sounds normal. No respiratory distress. She has no wheezes. She has no rhonchi. She has no rales.   Abdominal: Abdomen is soft. She exhibits no distension. There is abdominal tenderness.   Diffuse ttp, worse in RUQ There is no rebound and no guarding.   Musculoskeletal:         General: No tenderness or edema. Normal range of motion.      Cervical back: Normal range of motion and neck supple.     Neurological: She is alert and oriented to person, place, and time. She has normal strength. GCS score is 15. GCS eye subscore is 4. GCS verbal subscore is 5. GCS motor subscore is 6.   Skin: Skin is warm and dry. No rash noted. No erythema.    Psychiatric: She has a normal mood and affect. Her behavior is normal. Judgment and thought content normal.         ED Course   Procedures  Labs Reviewed   CBC W/ AUTO DIFFERENTIAL - Abnormal       Result Value    WBC 3.88 (*)     RBC 3.87 (*)     Hemoglobin 12.7      Hematocrit 39.0       (*)     MCH 32.8 (*)     MCHC 32.6      RDW 14.8 (*)     Platelets 249      MPV 10.6      Immature Granulocytes 0.3      Gran # (ANC) 1.9      Immature Grans (Abs) 0.01      Lymph # 1.4      Mono # 0.4      Eos # 0.2      Baso # 0.05      nRBC 0      Gran % 47.6      Lymph % 35.1      Mono % 10.8      Eosinophil % 4.9      Basophil % 1.3      Platelet Estimate Appears normal      Toxic Granulation Present      Vacuolated Granulocytes Present      Differential Method Automated     COMPREHENSIVE METABOLIC PANEL - Abnormal    Sodium 140      Potassium 3.8      Chloride 113 (*)     CO2 19 (*)     Glucose 77      BUN 13      Creatinine 0.8      Calcium 9.0      Total Protein 7.1      Albumin 3.6      Total Bilirubin 0.2      Alkaline Phosphatase 150      AST 30      ALT 72 (*)     eGFR >60.0      Anion Gap 8     URINALYSIS, REFLEX TO URINE CULTURE - Abnormal    Specimen UA Urine, Clean Catch      Color, UA Colorless (*)     Appearance, UA Clear      pH, UA 7.0      Specific Gravity, UA 1.005      Protein, UA Negative      Glucose, UA Negative      Ketones, UA Negative      Bilirubin (UA) Negative      Occult Blood UA Negative      Nitrite, UA Negative      Leukocytes, UA Negative      Narrative:     Specimen Source->Urine   POCT GLUCOSE - Abnormal    POCT Glucose 47 (*)    POCT GLUCOSE - Abnormal    POCT Glucose 52 (*)    LIPASE    Lipase 11     MAGNESIUM    Magnesium 2.3     TROPONIN I HIGH SENSITIVITY    Troponin I High Sensitivity <3     POCT URINE PREGNANCY    POC Preg Test, Ur Negative       Acceptable Yes     POCT GLUCOSE    POCT Glucose 79       EKG Readings: (Independently Interpreted)   Initial Reading:  No STEMI. Rhythm: Normal Sinus Rhythm. Heart Rate: 71. Ectopy: No Ectopy. Conduction: Normal. ST Segments: Normal ST Segments. T Waves: Normal.   Low voltage       Imaging Results              CT Abdomen Pelvis With IV Contrast Oral Contrast for GI Bypass (In process)  Result time 12/06/24 21:42:32                     Medications   glucose chewable tablet 8 g (8 g Oral Given 12/6/24 1923)   sodium chloride 0.9% bolus 1,000 mL 1,000 mL (0 mLs Intravenous Stopped 12/6/24 1917)   HYDROmorphone injection 0.5 mg (0.5 mg Intravenous Given 12/6/24 1814)   dextrose 10% bolus 500 mL 500 mL (0 mLs Intravenous Stopped 12/6/24 2019)   glucose chewable tablet 8 g (8 g Oral Given 12/6/24 2245)     Medical Decision Making  49-year-old female presents with abdominal pain.  Vitals unremarkable.  Physical exam as above.  Labs with elevated LFTs and hypoglycemia.  Hypoglycemia treated with sugar and D10.  Initially resolved, but then recurred.  Gave more glucose tablets.  CT pending at turnover.  Discussed with Dr. Owens who will f/up CT and admit.  Did bedside teaching.  All questions answered.  Patient acknowledges understanding.     Amount and/or Complexity of Data Reviewed  Independent Historian: friend  Labs: ordered. Decision-making details documented in ED Course.  Radiology: ordered. Decision-making details documented in ED Course.    Risk  OTC drugs.  Prescription drug management.  Decision regarding hospitalization.  Diagnosis or treatment significantly limited by social determinants of health.                                      Clinical Impression:  Final diagnoses:  [R10.11] Right upper quadrant abdominal pain (Primary)  [E16.2] Hypoglycemia  [Z98.84] History of Sharon-en-Y gastric bypass          ED Disposition Condition    Observation Stable                Marcus Chopra MD  12/06/24 7883

## 2024-12-07 PROBLEM — I95.9 HYPOTENSION: Status: ACTIVE | Noted: 2024-12-07

## 2024-12-07 PROBLEM — R10.13 DYSPEPSIA: Status: ACTIVE | Noted: 2024-12-07

## 2024-12-07 PROBLEM — F31.9 BIPOLAR DISORDER: Status: ACTIVE | Noted: 2024-12-07

## 2024-12-07 LAB
ALBUMIN SERPL BCP-MCNC: 2.8 G/DL (ref 3.5–5.2)
ALP SERPL-CCNC: 120 U/L (ref 40–150)
ALT SERPL W/O P-5'-P-CCNC: 72 U/L (ref 10–44)
ANION GAP SERPL CALC-SCNC: 7 MMOL/L (ref 8–16)
AST SERPL-CCNC: 72 U/L (ref 10–40)
BASOPHILS # BLD AUTO: 0.05 K/UL (ref 0–0.2)
BASOPHILS NFR BLD: 1.6 % (ref 0–1.9)
BILIRUB SERPL-MCNC: 0.2 MG/DL (ref 0.1–1)
BUN SERPL-MCNC: 11 MG/DL (ref 6–20)
C PEPTIDE SERPL-MCNC: 4.07 NG/ML (ref 0.78–5.19)
CALCIUM SERPL-MCNC: 7.9 MG/DL (ref 8.7–10.5)
CHLORIDE SERPL-SCNC: 116 MMOL/L (ref 95–110)
CO2 SERPL-SCNC: 16 MMOL/L (ref 23–29)
CORTIS SERPL-MCNC: 8.4 UG/DL
CREAT SERPL-MCNC: 0.8 MG/DL (ref 0.5–1.4)
DIFFERENTIAL METHOD BLD: ABNORMAL
EOSINOPHIL # BLD AUTO: 0.1 K/UL (ref 0–0.5)
EOSINOPHIL NFR BLD: 3.8 % (ref 0–8)
ERYTHROCYTE [DISTWIDTH] IN BLOOD BY AUTOMATED COUNT: 15.2 % (ref 11.5–14.5)
EST. GFR  (NO RACE VARIABLE): >60 ML/MIN/1.73 M^2
ESTIMATED AVG GLUCOSE: 88 MG/DL (ref 68–131)
GLUCOSE SERPL-MCNC: 207 MG/DL (ref 70–110)
GLUCOSE SERPL-MCNC: 74 MG/DL (ref 70–110)
HBA1C MFR BLD: 4.7 % (ref 4–5.6)
HCT VFR BLD AUTO: 36.2 % (ref 37–48.5)
HGB BLD-MCNC: 11.2 G/DL (ref 12–16)
IMM GRANULOCYTES # BLD AUTO: 0.01 K/UL (ref 0–0.04)
IMM GRANULOCYTES NFR BLD AUTO: 0.3 % (ref 0–0.5)
INSULIN COLLECTION INTERVAL: NORMAL
INSULIN SERPL-ACNC: 1.7 UU/ML
LIPASE SERPL-CCNC: 13 U/L (ref 4–60)
LYMPHOCYTES # BLD AUTO: 1.1 K/UL (ref 1–4.8)
LYMPHOCYTES NFR BLD: 33.8 % (ref 18–48)
MAGNESIUM SERPL-MCNC: 2 MG/DL (ref 1.6–2.6)
MCH RBC QN AUTO: 31.7 PG (ref 27–31)
MCHC RBC AUTO-ENTMCNC: 30.9 G/DL (ref 32–36)
MCV RBC AUTO: 103 FL (ref 82–98)
MONOCYTES # BLD AUTO: 0.4 K/UL (ref 0.3–1)
MONOCYTES NFR BLD: 11.5 % (ref 4–15)
NEUTROPHILS # BLD AUTO: 1.5 K/UL (ref 1.8–7.7)
NEUTROPHILS NFR BLD: 49 % (ref 38–73)
NRBC BLD-RTO: 0 /100 WBC
OHS QRS DURATION: 100 MS
OHS QRS DURATION: 98 MS
OHS QTC CALCULATION: 482 MS
OHS QTC CALCULATION: 489 MS
PHOSPHATE SERPL-MCNC: 4.3 MG/DL (ref 2.7–4.5)
PLATELET # BLD AUTO: 281 K/UL (ref 150–450)
PMV BLD AUTO: 10.7 FL (ref 9.2–12.9)
POCT GLUCOSE: 53 MG/DL (ref 70–110)
POCT GLUCOSE: 67 MG/DL (ref 70–110)
POCT GLUCOSE: 70 MG/DL (ref 70–110)
POCT GLUCOSE: 75 MG/DL (ref 70–110)
POCT GLUCOSE: 76 MG/DL (ref 70–110)
POCT GLUCOSE: 79 MG/DL (ref 70–110)
POCT GLUCOSE: 84 MG/DL (ref 70–110)
POCT GLUCOSE: 87 MG/DL (ref 70–110)
POCT GLUCOSE: 92 MG/DL (ref 70–110)
POCT GLUCOSE: 92 MG/DL (ref 70–110)
POCT GLUCOSE: 93 MG/DL (ref 70–110)
POCT GLUCOSE: 99 MG/DL (ref 70–110)
POTASSIUM SERPL-SCNC: 4.9 MMOL/L (ref 3.5–5.1)
PROCALCITONIN SERPL IA-MCNC: <0.02 NG/ML
PROT SERPL-MCNC: 5.9 G/DL (ref 6–8.4)
RBC # BLD AUTO: 3.53 M/UL (ref 4–5.4)
SODIUM SERPL-SCNC: 139 MMOL/L (ref 136–145)
T4 FREE SERPL-MCNC: 1.14 NG/DL (ref 0.71–1.51)
TSH SERPL DL<=0.005 MIU/L-ACNC: 0.98 UIU/ML (ref 0.4–4)
WBC # BLD AUTO: 3.14 K/UL (ref 3.9–12.7)

## 2024-12-07 PROCEDURE — 96361 HYDRATE IV INFUSION ADD-ON: CPT

## 2024-12-07 PROCEDURE — 96366 THER/PROPH/DIAG IV INF ADDON: CPT

## 2024-12-07 PROCEDURE — 83690 ASSAY OF LIPASE: CPT | Performed by: HOSPITALIST

## 2024-12-07 PROCEDURE — 80377 DRUG/SUBSTANCE NOS 7/MORE: CPT | Performed by: HOSPITALIST

## 2024-12-07 PROCEDURE — 25000003 PHARM REV CODE 250: Performed by: HOSPITALIST

## 2024-12-07 PROCEDURE — 84681 ASSAY OF C-PEPTIDE: CPT | Performed by: HOSPITALIST

## 2024-12-07 PROCEDURE — 96374 THER/PROPH/DIAG INJ IV PUSH: CPT | Mod: 59

## 2024-12-07 PROCEDURE — G0378 HOSPITAL OBSERVATION PER HR: HCPCS

## 2024-12-07 PROCEDURE — 96376 TX/PRO/DX INJ SAME DRUG ADON: CPT

## 2024-12-07 PROCEDURE — 63600175 PHARM REV CODE 636 W HCPCS: Performed by: HOSPITALIST

## 2024-12-07 PROCEDURE — 83735 ASSAY OF MAGNESIUM: CPT | Performed by: HOSPITALIST

## 2024-12-07 PROCEDURE — 63600175 PHARM REV CODE 636 W HCPCS: Performed by: STUDENT IN AN ORGANIZED HEALTH CARE EDUCATION/TRAINING PROGRAM

## 2024-12-07 PROCEDURE — 84100 ASSAY OF PHOSPHORUS: CPT | Performed by: HOSPITALIST

## 2024-12-07 PROCEDURE — 99222 1ST HOSP IP/OBS MODERATE 55: CPT | Mod: ,,, | Performed by: STUDENT IN AN ORGANIZED HEALTH CARE EDUCATION/TRAINING PROGRAM

## 2024-12-07 PROCEDURE — 96365 THER/PROPH/DIAG IV INF INIT: CPT | Mod: 59

## 2024-12-07 PROCEDURE — 82962 GLUCOSE BLOOD TEST: CPT

## 2024-12-07 PROCEDURE — 84305 ASSAY OF SOMATOMEDIN: CPT | Performed by: HOSPITALIST

## 2024-12-07 PROCEDURE — 84439 ASSAY OF FREE THYROXINE: CPT | Performed by: HOSPITALIST

## 2024-12-07 PROCEDURE — 83525 ASSAY OF INSULIN: CPT | Performed by: HOSPITALIST

## 2024-12-07 PROCEDURE — 25000003 PHARM REV CODE 250: Performed by: STUDENT IN AN ORGANIZED HEALTH CARE EDUCATION/TRAINING PROGRAM

## 2024-12-07 PROCEDURE — 85025 COMPLETE CBC W/AUTO DIFF WBC: CPT | Performed by: HOSPITALIST

## 2024-12-07 PROCEDURE — 80053 COMPREHEN METABOLIC PANEL: CPT | Performed by: HOSPITALIST

## 2024-12-07 PROCEDURE — 83036 HEMOGLOBIN GLYCOSYLATED A1C: CPT | Performed by: HOSPITALIST

## 2024-12-07 PROCEDURE — 84443 ASSAY THYROID STIM HORMONE: CPT | Performed by: HOSPITALIST

## 2024-12-07 PROCEDURE — 96375 TX/PRO/DX INJ NEW DRUG ADDON: CPT

## 2024-12-07 PROCEDURE — 84145 PROCALCITONIN (PCT): CPT | Performed by: HOSPITALIST

## 2024-12-07 PROCEDURE — 82533 TOTAL CORTISOL: CPT | Performed by: HOSPITALIST

## 2024-12-07 RX ORDER — TRAMADOL HYDROCHLORIDE 50 MG/1
50 TABLET ORAL EVERY 6 HOURS PRN
Status: DISCONTINUED | OUTPATIENT
Start: 2024-12-07 | End: 2024-12-09

## 2024-12-07 RX ORDER — GABAPENTIN 300 MG/1
300 CAPSULE ORAL 3 TIMES DAILY
Status: DISCONTINUED | OUTPATIENT
Start: 2024-12-07 | End: 2024-12-09

## 2024-12-07 RX ORDER — PANTOPRAZOLE SODIUM 40 MG/1
40 TABLET, DELAYED RELEASE ORAL DAILY
Status: DISCONTINUED | OUTPATIENT
Start: 2024-12-07 | End: 2024-12-10

## 2024-12-07 RX ORDER — AZELASTINE 1 MG/ML
1 SPRAY, METERED NASAL 2 TIMES DAILY
Status: DISCONTINUED | OUTPATIENT
Start: 2024-12-07 | End: 2024-12-11 | Stop reason: HOSPADM

## 2024-12-07 RX ORDER — HYDROMORPHONE HYDROCHLORIDE 1 MG/ML
0.5 INJECTION, SOLUTION INTRAMUSCULAR; INTRAVENOUS; SUBCUTANEOUS EVERY 4 HOURS PRN
Status: DISCONTINUED | OUTPATIENT
Start: 2024-12-07 | End: 2024-12-08

## 2024-12-07 RX ORDER — CETIRIZINE HYDROCHLORIDE 10 MG/1
10 TABLET ORAL DAILY
Status: DISCONTINUED | OUTPATIENT
Start: 2024-12-07 | End: 2024-12-11 | Stop reason: HOSPADM

## 2024-12-07 RX ORDER — TALC
6 POWDER (GRAM) TOPICAL NIGHTLY PRN
Status: DISCONTINUED | OUTPATIENT
Start: 2024-12-07 | End: 2024-12-11 | Stop reason: HOSPADM

## 2024-12-07 RX ORDER — IBUPROFEN 200 MG
16 TABLET ORAL
Status: DISCONTINUED | OUTPATIENT
Start: 2024-12-07 | End: 2024-12-09

## 2024-12-07 RX ORDER — MORPHINE SULFATE 2 MG/ML
2 INJECTION, SOLUTION INTRAMUSCULAR; INTRAVENOUS EVERY 4 HOURS PRN
Status: DISCONTINUED | OUTPATIENT
Start: 2024-12-07 | End: 2024-12-07

## 2024-12-07 RX ORDER — ALBUTEROL SULFATE 90 UG/1
2 INHALANT RESPIRATORY (INHALATION) EVERY 6 HOURS PRN
Status: DISCONTINUED | OUTPATIENT
Start: 2024-12-07 | End: 2024-12-11 | Stop reason: HOSPADM

## 2024-12-07 RX ORDER — TOPIRAMATE 100 MG/1
200 TABLET, FILM COATED ORAL 2 TIMES DAILY
Status: DISCONTINUED | OUTPATIENT
Start: 2024-12-07 | End: 2024-12-11 | Stop reason: HOSPADM

## 2024-12-07 RX ORDER — ALUMINUM HYDROXIDE, MAGNESIUM HYDROXIDE, AND SIMETHICONE 1200; 120; 1200 MG/30ML; MG/30ML; MG/30ML
30 SUSPENSION ORAL 4 TIMES DAILY PRN
Status: DISCONTINUED | OUTPATIENT
Start: 2024-12-07 | End: 2024-12-07

## 2024-12-07 RX ORDER — FLUTICASONE PROPIONATE 50 MCG
2 SPRAY, SUSPENSION (ML) NASAL DAILY
Status: DISCONTINUED | OUTPATIENT
Start: 2024-12-07 | End: 2024-12-11 | Stop reason: HOSPADM

## 2024-12-07 RX ORDER — INSULIN ASPART 100 [IU]/ML
0-5 INJECTION, SOLUTION INTRAVENOUS; SUBCUTANEOUS
Status: DISCONTINUED | OUTPATIENT
Start: 2024-12-07 | End: 2024-12-11 | Stop reason: HOSPADM

## 2024-12-07 RX ORDER — HYDROXYZINE HYDROCHLORIDE 25 MG/1
50 TABLET, FILM COATED ORAL 3 TIMES DAILY PRN
Status: DISCONTINUED | OUTPATIENT
Start: 2024-12-07 | End: 2024-12-11 | Stop reason: HOSPADM

## 2024-12-07 RX ORDER — ESTRADIOL 0.5 MG/1
1 TABLET ORAL DAILY
Status: DISCONTINUED | OUTPATIENT
Start: 2024-12-07 | End: 2024-12-11 | Stop reason: HOSPADM

## 2024-12-07 RX ORDER — DICYCLOMINE HYDROCHLORIDE 20 MG/1
20 TABLET ORAL 3 TIMES DAILY
Status: DISCONTINUED | OUTPATIENT
Start: 2024-12-07 | End: 2024-12-08

## 2024-12-07 RX ORDER — DEXTROSE MONOHYDRATE 100 MG/ML
INJECTION, SOLUTION INTRAVENOUS CONTINUOUS
Status: DISCONTINUED | OUTPATIENT
Start: 2024-12-07 | End: 2024-12-08

## 2024-12-07 RX ORDER — DICYCLOMINE HYDROCHLORIDE 20 MG/1
20 TABLET ORAL 3 TIMES DAILY
Status: DISCONTINUED | OUTPATIENT
Start: 2024-12-07 | End: 2024-12-07

## 2024-12-07 RX ORDER — IBUPROFEN 200 MG
24 TABLET ORAL
Status: DISCONTINUED | OUTPATIENT
Start: 2024-12-07 | End: 2024-12-09

## 2024-12-07 RX ORDER — ONDANSETRON HYDROCHLORIDE 2 MG/ML
4 INJECTION, SOLUTION INTRAVENOUS EVERY 8 HOURS PRN
Status: DISCONTINUED | OUTPATIENT
Start: 2024-12-07 | End: 2024-12-11 | Stop reason: HOSPADM

## 2024-12-07 RX ORDER — DIVALPROEX SODIUM 250 MG/1
250 TABLET, FILM COATED, EXTENDED RELEASE ORAL DAILY
Status: DISCONTINUED | OUTPATIENT
Start: 2024-12-07 | End: 2024-12-11 | Stop reason: HOSPADM

## 2024-12-07 RX ORDER — POLYETHYLENE GLYCOL 3350 17 G/17G
17 POWDER, FOR SOLUTION ORAL 2 TIMES DAILY
Status: DISCONTINUED | OUTPATIENT
Start: 2024-12-07 | End: 2024-12-11 | Stop reason: HOSPADM

## 2024-12-07 RX ORDER — TAMSULOSIN HYDROCHLORIDE 0.4 MG/1
0.4 CAPSULE ORAL DAILY
Status: DISCONTINUED | OUTPATIENT
Start: 2024-12-07 | End: 2024-12-11 | Stop reason: HOSPADM

## 2024-12-07 RX ORDER — ACETAMINOPHEN 325 MG/1
650 TABLET ORAL EVERY 6 HOURS PRN
Status: DISCONTINUED | OUTPATIENT
Start: 2024-12-07 | End: 2024-12-11 | Stop reason: HOSPADM

## 2024-12-07 RX ORDER — NALOXONE HCL 0.4 MG/ML
0.02 VIAL (ML) INJECTION
Status: DISCONTINUED | OUTPATIENT
Start: 2024-12-07 | End: 2024-12-11 | Stop reason: HOSPADM

## 2024-12-07 RX ORDER — TIZANIDINE 4 MG/1
4 TABLET ORAL NIGHTLY PRN
Status: DISCONTINUED | OUTPATIENT
Start: 2024-12-07 | End: 2024-12-09

## 2024-12-07 RX ORDER — FLUOXETINE HYDROCHLORIDE 20 MG/1
20 CAPSULE ORAL DAILY
Status: DISCONTINUED | OUTPATIENT
Start: 2024-12-07 | End: 2024-12-11 | Stop reason: HOSPADM

## 2024-12-07 RX ORDER — SODIUM CHLORIDE 0.9 % (FLUSH) 0.9 %
10 SYRINGE (ML) INJECTION EVERY 12 HOURS PRN
Status: DISCONTINUED | OUTPATIENT
Start: 2024-12-07 | End: 2024-12-11 | Stop reason: HOSPADM

## 2024-12-07 RX ORDER — SUCRALFATE 1 G/10ML
1 SUSPENSION ORAL EVERY 6 HOURS
Status: DISCONTINUED | OUTPATIENT
Start: 2024-12-07 | End: 2024-12-07

## 2024-12-07 RX ORDER — ALUMINUM HYDROXIDE, MAGNESIUM HYDROXIDE, AND SIMETHICONE 1200; 120; 1200 MG/30ML; MG/30ML; MG/30ML
30 SUSPENSION ORAL
Status: DISCONTINUED | OUTPATIENT
Start: 2024-12-07 | End: 2024-12-08

## 2024-12-07 RX ORDER — GLUCAGON 1 MG
1 KIT INJECTION
Status: DISCONTINUED | OUTPATIENT
Start: 2024-12-07 | End: 2024-12-09

## 2024-12-07 RX ORDER — METHYLPHENIDATE HYDROCHLORIDE 5 MG/1
30 TABLET ORAL 2 TIMES DAILY WITH MEALS
Status: DISCONTINUED | OUTPATIENT
Start: 2024-12-07 | End: 2024-12-08

## 2024-12-07 RX ORDER — SUMATRIPTAN SUCCINATE 50 MG/1
100 TABLET ORAL EVERY 12 HOURS PRN
Status: DISCONTINUED | OUTPATIENT
Start: 2024-12-07 | End: 2024-12-11 | Stop reason: HOSPADM

## 2024-12-07 RX ORDER — ALUMINUM HYDROXIDE, MAGNESIUM HYDROXIDE, AND SIMETHICONE 1200; 120; 1200 MG/30ML; MG/30ML; MG/30ML
30 SUSPENSION ORAL
Status: DISCONTINUED | OUTPATIENT
Start: 2024-12-07 | End: 2024-12-07

## 2024-12-07 RX ORDER — SENNOSIDES 8.6 MG/1
8.6 TABLET ORAL 2 TIMES DAILY
Status: DISCONTINUED | OUTPATIENT
Start: 2024-12-07 | End: 2024-12-11 | Stop reason: HOSPADM

## 2024-12-07 RX ADMIN — HYDROMORPHONE HYDROCHLORIDE 0.5 MG: 1 INJECTION, SOLUTION INTRAMUSCULAR; INTRAVENOUS; SUBCUTANEOUS at 09:12

## 2024-12-07 RX ADMIN — Medication 16 G: at 09:12

## 2024-12-07 RX ADMIN — DEXTROSE MONOHYDRATE: 10 INJECTION, SOLUTION INTRAVENOUS at 04:12

## 2024-12-07 RX ADMIN — LEVOTHYROXINE SODIUM 125 MCG: 25 TABLET ORAL at 06:12

## 2024-12-07 RX ADMIN — AZELASTINE 137 MCG: 1 SPRAY, METERED NASAL at 09:12

## 2024-12-07 RX ADMIN — DIVALPROEX SODIUM 250 MG: 250 TABLET, EXTENDED RELEASE ORAL at 11:12

## 2024-12-07 RX ADMIN — HYDROXYZINE HYDROCHLORIDE 50 MG: 25 TABLET, FILM COATED ORAL at 08:12

## 2024-12-07 RX ADMIN — HYDROMORPHONE HYDROCHLORIDE 0.5 MG: 1 INJECTION, SOLUTION INTRAMUSCULAR; INTRAVENOUS; SUBCUTANEOUS at 05:12

## 2024-12-07 RX ADMIN — DICYCLOMINE HYDROCHLORIDE 20 MG: 20 TABLET ORAL at 11:12

## 2024-12-07 RX ADMIN — ONDANSETRON 4 MG: 2 INJECTION INTRAMUSCULAR; INTRAVENOUS at 04:12

## 2024-12-07 RX ADMIN — GABAPENTIN 300 MG: 300 CAPSULE ORAL at 09:12

## 2024-12-07 RX ADMIN — POLYETHYLENE GLYCOL 3350 17 G: 17 POWDER, FOR SOLUTION ORAL at 04:12

## 2024-12-07 RX ADMIN — HYDROMORPHONE HYDROCHLORIDE 0.5 MG: 1 INJECTION, SOLUTION INTRAMUSCULAR; INTRAVENOUS; SUBCUTANEOUS at 01:12

## 2024-12-07 RX ADMIN — DEXTROSE MONOHYDRATE: 10 INJECTION, SOLUTION INTRAVENOUS at 01:12

## 2024-12-07 RX ADMIN — TRAMADOL HYDROCHLORIDE 50 MG: 50 TABLET, COATED ORAL at 01:12

## 2024-12-07 RX ADMIN — SENNOSIDES 8.6 MG: 8.6 TABLET, FILM COATED ORAL at 08:12

## 2024-12-07 RX ADMIN — MORPHINE SULFATE 2 MG: 2 INJECTION, SOLUTION INTRAMUSCULAR; INTRAVENOUS at 04:12

## 2024-12-07 RX ADMIN — ONDANSETRON 4 MG: 2 INJECTION INTRAMUSCULAR; INTRAVENOUS at 08:12

## 2024-12-07 RX ADMIN — ALUMINUM HYDROXIDE, MAGNESIUM HYDROXIDE, AND SIMETHICONE 30 ML: 200; 200; 20 SUSPENSION ORAL at 09:12

## 2024-12-07 RX ADMIN — ALUMINUM HYDROXIDE, MAGNESIUM HYDROXIDE, AND SIMETHICONE 30 ML: 200; 200; 20 SUSPENSION ORAL at 04:12

## 2024-12-07 RX ADMIN — ALUMINUM HYDROXIDE, MAGNESIUM HYDROXIDE, AND SIMETHICONE 30 ML: 200; 200; 20 SUSPENSION ORAL at 08:12

## 2024-12-07 RX ADMIN — CETIRIZINE HYDROCHLORIDE 10 MG: 10 TABLET, FILM COATED ORAL at 09:12

## 2024-12-07 RX ADMIN — FLUOXETINE HYDROCHLORIDE 20 MG: 20 CAPSULE ORAL at 09:12

## 2024-12-07 RX ADMIN — AZELASTINE 137 MCG: 1 SPRAY, METERED NASAL at 11:12

## 2024-12-07 RX ADMIN — PANTOPRAZOLE SODIUM 40 MG: 40 TABLET, DELAYED RELEASE ORAL at 09:12

## 2024-12-07 RX ADMIN — DEXTROSE MONOHYDRATE 250 ML: 100 INJECTION, SOLUTION INTRAVENOUS at 09:12

## 2024-12-07 RX ADMIN — DICYCLOMINE HYDROCHLORIDE 20 MG: 20 TABLET ORAL at 03:12

## 2024-12-07 RX ADMIN — Medication 16 G: at 05:12

## 2024-12-07 RX ADMIN — SODIUM CHLORIDE 500 ML: 9 INJECTION, SOLUTION INTRAVENOUS at 03:12

## 2024-12-07 RX ADMIN — GABAPENTIN 300 MG: 300 CAPSULE ORAL at 08:12

## 2024-12-07 RX ADMIN — TRAMADOL HYDROCHLORIDE 50 MG: 50 TABLET, COATED ORAL at 09:12

## 2024-12-07 RX ADMIN — ESTRADIOL 1 MG: 0.5 TABLET ORAL at 11:12

## 2024-12-07 RX ADMIN — TAMSULOSIN HYDROCHLORIDE 0.4 MG: 0.4 CAPSULE ORAL at 09:12

## 2024-12-07 RX ADMIN — GABAPENTIN 300 MG: 300 CAPSULE ORAL at 03:12

## 2024-12-07 RX ADMIN — Medication 16 G: at 04:12

## 2024-12-07 RX ADMIN — TOPIRAMATE 200 MG: 100 TABLET, FILM COATED ORAL at 08:12

## 2024-12-07 RX ADMIN — ONDANSETRON 4 MG: 2 INJECTION INTRAMUSCULAR; INTRAVENOUS at 12:12

## 2024-12-07 RX ADMIN — TOPIRAMATE 200 MG: 100 TABLET, FILM COATED ORAL at 09:12

## 2024-12-07 RX ADMIN — DICYCLOMINE HYDROCHLORIDE 20 MG: 20 TABLET ORAL at 08:12

## 2024-12-07 NOTE — PLAN OF CARE
[facility] - Emergency Dept  Initial Discharge Assessment       Primary Care Provider: Iliana Merritt MD    Admission Diagnosis: Hypoglycemia [E16.2]    Admission Date: 12/6/2024  Expected Discharge Date:     Pt stated she uses a walker to assist with ambulation and is independent with her ADL's.  Pt stated she is not driving as much and pays her friend Beto to drive her to appointments.  Post acute services discussed and pt would benefit from home health PT/OT on d/c     Pt to d/c with home health when medically ready    Transition of Care Barriers: (P) None    Payor: [place] [employer] / Plan: [facility] EXCHANGE PLAN / Product Type: Commercial /     Extended Emergency Contact Information  Primary Emergency Contact: Isidra Elias  Home Phone: [phone]  Relation: Mother  Preferred language: English   needed? No  Secondary Emergency Contact: JoseBeto  Mobile Phone: [phone]  Relation: Friend  Preferred language: English   needed? No  Father: Keke Lambert  Mobile Phone: [phone]    Discharge Plan A: (P) Home Health, Home  Discharge Plan B: (P) Home, Home Health      [facility] [address]  [address]  [facility] [address]  Phone: [phone] Fax: [phone]    [facility] [address] [place] [address]  Phone: [phone] Fax: [phone]      Initial Assessment (most recent)       Adult Discharge Assessment - 12/07/24 0834          Discharge Assessment    Assessment Type Discharge Planning Assessment (P)      Confirmed/corrected address, phone number and insurance Yes (P)      Confirmed Demographics Correct on Facesheet (P)      Source of Information patient (P)      Reason For Admission Hypoglycemia (P)      People in Home friend(s) (P)      Facility Arrived From: home (P)      Do you expect to return to your current living situation? Yes (P)      Do you have help at home or someone to help you manage  your care at home? No (P)      Prior to hospitilization cognitive status: Alert/Oriented;No Deficits (P)      Current cognitive status: Alert/Oriented;No Deficits (P)      Walking or Climbing Stairs Difficulty yes (P)      Walking or Climbing Stairs ambulation difficulty, requires equipment (P)      Mobility Management walker (P)      Dressing/Bathing Difficulty no (P)      Home Accessibility not wheelchair accessible;stairs to enter home (P)      Number of Stairs, Main Entrance five (P)      Home Layout Able to live on 1st floor (P)      Equipment Currently Used at Home walker, standard (P)      Patient currently being followed by outpatient case management? No (P)      Do you currently have service(s) that help you manage your care at home? No (P)      Do you have any problems affording any of your prescribed medications? No (P)      Is the patient taking medications as prescribed? yes (P)      Who is going to help you get home at discharge? family/friends (P)      How do you get to doctors appointments? family or friend will provide;car, drives self (P)      Are you on dialysis? No (P)      Do you take coumadin? No (P)      Discharge Plan A Home Health;Home (P)      Discharge Plan B Home;Home Health (P)      DME Needed Upon Discharge  none (P)      Discharge Plan discussed with: Patient (P)      Transition of Care Barriers None (P)         Physical Activity    On average, how many days per week do you engage in moderate to strenuous exercise (like a brisk walk)? 7 days (P)      On average, how many minutes do you engage in exercise at this level? 30 min (P)         Financial Resource Strain    How hard is it for you to pay for the very basics like food, housing, medical care, and heating? Not very hard (P)         Housing Stability    In the last 12 months, was there a time when you were not able to pay the mortgage or rent on time? No (P)      At any time in the past 12 months, were you homeless or living in a  shelter (including now)? No (P)         Transportation Needs    Has the lack of transportation kept you from medical appointments, meetings, work or from getting things needed for daily living? No (P)         Food Insecurity    Within the past 12 months, you worried that your food would run out before you got the money to buy more. Never true (P)      Within the past 12 months, the food you bought just didn't last and you didn't have money to get more. Never true (P)         Stress    Do you feel stress - tense, restless, nervous, or anxious, or unable to sleep at night because your mind is troubled all the time - these days? To some extent (P)         Social Isolation    How often do you feel lonely or isolated from those around you?  Rarely (P)         Alcohol Use    Q1: How often do you have a drink containing alcohol? Never (P)      Q2: How many drinks containing alcohol do you have on a typical day when you are drinking? Patient does not drink (P)      Q3: How often do you have six or more drinks on one occasion? Never (P)         Utilities    In the past 12 months has the electric, gas, oil, or water company threatened to shut off services in your home? No (P)         Health Literacy    How often do you need to have someone help you when you read instructions, pamphlets, or other written material from your doctor or pharmacy? Sometimes (P)         OTHER    Name(s) of People in Home friend Beto (P)                       Discharge Plan A and Plan B have been determined by review of patient's clinical status, future medical and therapeutic needs, and coverage/benefits for post-acute care in coordination with multidisciplinary team members.    Samara Basurto CD, MSW, LMSW, RSW   Case Management  [facility]  Email: [email or web address]

## 2024-12-07 NOTE — PLAN OF CARE
Tayla Lambetr is a 49-year-old female with PMH of T2DM, HTN, asthma, hypothyroidism, anxiety, depression, bipolar disorder,  ADHD, migraine headache, s/p Sharon-en-Y gastric bypass 1993 in [place] who presented to ED for evaluation of ongoing abdominal pain associated with nausea, vomiting and alternating diarrhea/constipation for last 2 years. She was found to be hypoglycemic and placed on D5W.     Exam:   Diffuse abdominal tenderness to palpation, predominantly in the epigastric region.     Labs reviewed.     Hypoglycemia -likely due to poor oral intake and use of Mounjaro   - c/w D10 infusion  - plan to wean off once able to tolerate PO intake and maintain glucose levels  - POCT glucose checks  - Encourage PO intake  - Serum insulin, c peptide levels normal. A1c 4.7, am cortisol 8.4   -Follow serum IGF, sulfonylurea screen to r/o other etiology of hypoglycemia  - ideally must get these labs during hypoglycemia episode before correcting  - May get Endo involved if hypoglycemia persists despite PO intake improvement     Dyspepsia  -long standing on and off abdominal pain, N/V/D/constipation for last 2 years with 140 lbs weight loss   - Reports ovarian cancer in mother and breast cancer in grandmother. According to the patient she has had mammogram and colonoscopy that were normal but she has never had pap smear.   -likely from adverse reaction of Mounjaro vs gastritis of gastric remnant   -CT abdomen showed mild inflammatory changes of gastric remnant   - Lipase 11/13  -Started on PPI   -Scheduled maalox - assess response  -consult GI for further evaluation and possible EGD in setting of N/V with significant weight loss         Hypotension/Dehydration  -borderline low SBP  in a patient with h/o HTN treated with olmesartan   -she will likely no longer need BP medication given significant weight loss   -hypotension likely from dehydration in setting of poor oral intake   -no signs of bleeding, overt  infection or sepsis   -s/p 2.5 L bolus  -Tsh/T4, procal normal   -Encourage PO intake as above             Hypothyroidism  -continue home synthroid   -TSH/FT4 normal on admission

## 2024-12-07 NOTE — HPI
"Tayla Lambert is a 49-year-old female with PMH of T2DM, HTN, asthma, hypothyroidism, anxiety, depression, bipolar disorder,  ADHD, migraine headache, s/p Sharon-en-Y gastric bypass 1993 in [place] who presented to ED for evaluation of ongoing abdominal pain associated with nausea, vomiting and alternating diarrhea/constipation for last 2 years. Patient reports above symptoms are ongoing on and off for last 2 years when she lost 140 lbs. She reports decreased food intake due to aggravation of her symptoms. She denies blood in her emesis or stool. She went to hospitals in [facility], [place] and [place] couple of days for similar symptoms. Patient states doctor at [place] told "her liver is inflamed". She is a poor historian but it seems like she had a virtual visit with a GI specialist yesterday who recommended patient to come to [facility] to be evaluated by specialist. She denies fever, chill, cough, sob, chest pain, dysuria, hematuria, focal weakness, numbness, dark stool or bleeding from other sites. Denies tobacco, alcohol or other illicit drug use. She lives at home in [facility] with a friend who is present at bedside.     CT abdomen with IV and oral contrast showed status post Sharon-en-Y gastric bypass. Mild wall thickening and mucosal hyperenhancement suggesting inflammation of the gastric remnant/bypassed stomach. Consider endoscopy follow-up and correlation with symptoms.Status post cholecystectomy with similar mild intra and extrahepatic biliary ductal dilatation which may be seen with post cholecystectomy change. No radiopaque stones identified.       ED course: afebrile, borderline low SBP , oxygenating well on room air.   Labs notable for hypoglycemia glucose 77 >> 47 >> 53 >> 115 after dextrose 50 gm >> 25 gm >> D10 infusion. Patient also received NS 2 liter bolus, glucose tab and dilaudid 0.5 mg IV in ED. During my interview patient is awake, conversant and not in " distress. She reports feeling hungry and ate a turkey sandwich w/o issue.

## 2024-12-07 NOTE — CONSULTS
"[facility]  Gastroenterology  Consult Note    Patient Name: Tayla Lambert  MRN: [id number]  Admission Date: 12/6/2024  Hospital Length of Stay: 0 days  Code Status: Full Code   Attending Provider: Dr. Ibarra  Consulting Provider: Asiya Alba MD  Primary Care Physician: Iliana Merritt MD  Principal Problem:Hypoglycemia    Inpatient consult to Gastroenterology  Consult performed by: Asiya Alba MD  Consult ordered by: Alvina Yancey DO        Subjective:     HPI: Tayla Lambert is a 49 y.o. female with history of T2DM, HTN, asthma, hypothyroidism, anxiety, depression, bipolar disorder,  ADHD, migraine headache, s/p Sharon-en-Y gastric bypass 1993 in [place] who presented to ED for evaluation of ongoing abdominal pain associated with nausea, vomiting and alternating diarrhea/constipation for last 2 years. GI consulted for the same.   Vitally stable, initial labs in the ED significant for hypoglycemia, replaced and now stable. She has extensive hypoglycemia workup pending. She reports being admitted to an ICU for a week recently with "very dangerously low liver enzymes" and then saw Dr. Pete but his note mentions that she had elevated liver enzymes that rapidly came down and he questions GB stone that passed as the cause. Liver enzymes mildly elevated at 72 today with normal alp and tbili. Her pain is worse on the right at the moment but she tells me her pain moves around. She says she has felt "unwell" for two years and will do anything to get better.         ROS     Objective:     Vitals:    12/07/24 1349   BP:    Pulse:    Resp: 18   Temp:          Constitutional:  not in acute distress and well developed  HENT: Head: Normal, normocephalic, atraumatic.  Eyes: conjunctiva clear and sclera nonicteric  GI: soft, nondistended, tenderness moderate in the entire abdomen, and without rebound  Skin: normal color  Neurological: alert, oriented x3      Significant " Labs:  Reviewed the following pertinent laboratory tests: cbc, cmp. Notable for Hb Hb 11.2, albumin 2.9.    Significant Imaging:  CT A/P reviewed 12/6/24  Impression:     Status post Sharon-en-Y gastric bypass.  Mild wall thickening and mucosal hyperenhancement suggesting inflammation of the gastric remnant/bypassed stomach.  Consider endoscopy follow-up and correlation with symptoms.     Status post cholecystectomy with similar mild intra and extrahepatic biliary ductal dilatation which may be seen with post cholecystectomy change.  No radiopaque stones identified.     Trace right pleural effusion.  Anasarca.     Large colonic stool burden.    Assessment/Plan:     Tayla Lambert is a 49 y.o. female with history of T2DM, HTN, asthma, hypothyroidism, anxiety, depression, bipolar disorder,  ADHD, migraine headache, s/p Sharon-en-Y gastric bypass 1993 in [place] who presented to ED for evaluation of ongoing abdominal pain associated with nausea, vomiting and alternating diarrhea/constipation for last 2 years. GI consulted for the same.    Problem List:  Nausea/vomiting  Abdominal pain  Constipation  Gastric remnant wall thickening  Hypoglycemia, resolved      Recommendations:  - antiemetics per primary team  - aggressive bowel regimen with Miralax and Senna BID   - depending on clinical course, may need EGD to rule out marginal ulcer   - added thiamine to tomorrow mornings labs       Thank you for involving us in the care of Tayla Lambert. Please call with any additional questions, concerns or changes in the patient's clinical status. We will continue to follow.    Asiya Alba MD  Gastroenterology and Hepatology Fellow, PGY-4

## 2024-12-07 NOTE — ASSESSMENT & PLAN NOTE
Patient's FSGs are controlled on current medication regimen.  Last A1c reviewed-   Lab Results   Component Value Date    HGBA1C 4.8 11/24/2024     Most recent fingerstick glucose reviewed-   Recent Labs   Lab 12/07/24  0031 12/07/24  0118 12/07/24  0231 12/07/24  0359   POCTGLUCOSE 92 99 93 84     Current correctional scale  Low  Maintain anti-hyperglycemic dose as follows-   Antihyperglycemics (From admission, onward)      Start     Stop Route Frequency Ordered    12/07/24 0129  insulin aspart U-100 pen 0-5 Units         -- SubQ Before meals & nightly PRN 12/07/24 0030          Hold Oral hypoglycemics while patient is in the hospital.

## 2024-12-07 NOTE — ASSESSMENT & PLAN NOTE
-long standing on and off abdominal pain, N/V/D/constipation for last 2 years with 140 lbs weight loss   -likely from adverse reaction of Mounjaro vs gastritis of gastric remnant   -CT abdomen showed mild inflammatory changes of gastric remnant   -LFTs unremarkable   -mild TTP over RUQ and across lower abdomen on my exam w/o peritonitis   -will start on PPI   -consult GI for further evaluation and possible EGD in setting of N/V with significant weight loss

## 2024-12-07 NOTE — ASSESSMENT & PLAN NOTE
-no acute issue, lives at home in [facility] [place] [facility] with a friend   -denies SI/HI  -continue home Vraylar and depakote

## 2024-12-07 NOTE — ED NOTES
Pt attempted to eat jello, had one episode vomit and one episode of diarrhea shortly after. Abd pain 7/10 as charted

## 2024-12-07 NOTE — ASSESSMENT & PLAN NOTE
Patient has recurrent depression which is moderate and is currently controlled. Will Continue anti-depressant medications. We will not consult psychiatry at this time. Patient does not display psychosis at this time. Continue to monitor closely and adjust plan of care as needed.  -continue home fluoxetine, Latuda and Trintellix

## 2024-12-07 NOTE — PROVIDER PROGRESS NOTES - EMERGENCY DEPT.
Signout Note    I received signout from the previous provider.     Chief complaint:  Abdominal Pain (Sent by primary for right sided abdominal pain, patient says she has not been eating or drinking, patient endorses constipation, patient says something is wrong with her liver and controlling her sugars, poor historian )      Pertinent history &exam:  Tayla Lambert is a 49 y.o. female with pertinent PMH of prior gastric bypass who presented to emergency department with complaint of abdominal pain.  Patient recently admitted to outside hospital for recurrent hypoglycemia and elevated LFTs.  Here, has recurrent hypoglycemia despite receiving dextrose bolus.  Will order infusion.  Signed out to me pending CT abdomen pelvis for Hospital Medicine admission.    Vitals:    12/07/24 0115   BP: 104/64   Pulse: 68   Resp: 16   Temp:        Imaging Studies:    CT Abdomen Pelvis With IV Contrast Oral Contrast for GI Bypass   Final Result      Status post Sharon-en-Y gastric bypass.  Mild wall thickening and mucosal hyperenhancement suggesting inflammation of the gastric remnant/bypassed stomach.  Consider endoscopy follow-up and correlation with symptoms.      Status post cholecystectomy with similar mild intra and extrahepatic biliary ductal dilatation which may be seen with post cholecystectomy change.  No radiopaque stones identified.      Trace right pleural effusion.  Anasarca.      Large colonic stool burden.      Electronically signed by resident: Greg Durham   Date:    12/06/2024   Time:    22:12      Electronically signed by: Alfonso Martin MD   Date:    12/06/2024   Time:    22:57          Medications Given:  Medications   glucose chewable tablet 8 g (8 g Oral Given 12/6/24 1923)   albuterol inhaler 2 puff (has no administration in time range)   azelastine 137 mcg (0.1 %) nasal spray 137 mcg (has no administration in time range)   cetirizine tablet 10 mg (has no administration in time range)   divalproex ER 24  hr tablet 250 mg (has no administration in time range)   estradioL tablet 1 mg (has no administration in time range)   FLUoxetine capsule 20 mg (has no administration in time range)   fluticasone propionate 50 mcg/actuation nasal spray 100 mcg (has no administration in time range)   gabapentin capsule 300 mg (has no administration in time range)   hydrOXYzine HCL tablet 50 mg (has no administration in time range)   levothyroxine tablet 125 mcg (has no administration in time range)   lurasidone tablet 60 mg (has no administration in time range)   pantoprazole EC tablet 40 mg (has no administration in time range)   sumatriptan tablet 100 mg (has no administration in time range)   tamsulosin 24 hr capsule 0.4 mg (has no administration in time range)   tiZANidine tablet 4 mg (has no administration in time range)   topiramate tablet 200 mg (has no administration in time range)   sodium chloride 0.9% flush 10 mL (has no administration in time range)   naloxone 0.4 mg/mL injection 0.02 mg (has no administration in time range)   glucose chewable tablet 16 g (has no administration in time range)   glucose chewable tablet 24 g (has no administration in time range)   glucagon (human recombinant) injection 1 mg (has no administration in time range)   acetaminophen tablet 650 mg (has no administration in time range)   ondansetron injection 4 mg (has no administration in time range)   insulin aspart U-100 pen 0-5 Units (has no administration in time range)   melatonin tablet 6 mg (has no administration in time range)   aluminum-magnesium hydroxide-simethicone 200-200-20 mg/5 mL suspension 30 mL (has no administration in time range)   dextrose 10% bolus 125 mL 125 mL (has no administration in time range)   dextrose 10% bolus 250 mL 250 mL (has no administration in time range)   traMADoL tablet 50 mg (50 mg Oral Given 12/7/24 0104)   dicyclomine tablet 20 mg (has no administration in time range)   sodium chloride 0.9% bolus 1,000  mL 1,000 mL (0 mLs Intravenous Stopped 12/6/24 1917)   HYDROmorphone injection 0.5 mg (0.5 mg Intravenous Given 12/6/24 1814)   dextrose 10% bolus 500 mL 500 mL (0 mLs Intravenous Stopped 12/6/24 2019)   glucose chewable tablet 8 g (8 g Oral Given 12/6/24 2245)   iohexoL (OMNIPAQUE 350) injection 75 mL (75 mLs Intravenous Given 12/6/24 2147)   iohexoL (OMNIPAQUE 350) injection 30 mL (30 mLs Oral Given 12/6/24 2147)   dextrose 10% bolus 250 mL 250 mL (0 mLs Intravenous Stopped 12/6/24 2318)   dextrose 10 % infusion (100 mL/hr Intravenous Restarted 12/6/24 2316)   sodium chloride 0.9% bolus 1,000 mL 1,000 mL (0 mLs Intravenous Stopped 12/7/24 0014)       Pending Items/ MDM:  49 y.o. female with hypoglycemia and abdominal pain.      CT findings reviewed.  Patient started on dextrose infusion.  Admitted to Hospital Medicine    Diagnostic Impression:    1. Right upper quadrant abdominal pain    2. Hypoglycemia    3. History of Sharon-en-Y gastric bypass    4. Chest pain         ED Disposition Condition    Observation Stable               Thu Owens MD  Emergency Medicine

## 2024-12-07 NOTE — ED NOTES
Assumed care of the patient. Report received from BETTINA Murphy. Pt on continuous cardiac monitoring, continuous pulse oximetry, and automatic BP cuff cycling Q15min. Pt in hospital gown, side rails up X2, bed low and locked, and call light is placed within reach. One family/visitors at bedside at this time. Pt denies any complaints or needs.     Tayla Lambert, a 49 y.o. female presents to the ED w/ complaint of abdominal pain    Triage note:  Chief Complaint   Patient presents with    Abdominal Pain     Sent by primary for right sided abdominal pain, patient says she has not been eating or drinking, patient endorses constipation, patient says something is wrong with her liver and controlling her sugars, poor historian      Review of patient's allergies indicates:  No Known Allergies  Past Medical History:   Diagnosis Date    ADHD (attention deficit hyperactivity disorder)     Anxiety     Asthma     Depression     Diabetes mellitus     Encounter for blood transfusion     Essential hypertension 02/22/2022    Hypothyroidism 02/04/2020    Kidney stone 02/08/2022    Migraine without status migrainosus, not intractable 02/04/2020    Rheumatoid arthritis involving both hands with positive rheumatoid factor 02/21/2018    T12 burst fracture     Tremors of nervous system 02/04/2020

## 2024-12-07 NOTE — H&P
"[facility] - Emergency Dept  [facility] Medicine  History & Physical    Patient Name: Tayla Lambert  MRN: [id number]  Patient Class: OP- Observation  Admission Date: 12/6/2024  Attending Physician: Melvin Aldridge MD   Primary Care Provider: Iliana Merritt MD         Patient information was obtained from patient, ER records, and Friend at bedside .     Subjective:     Principal Problem:Hypoglycemia    Chief Complaint:   Chief Complaint   Patient presents with    Abdominal Pain     Sent by primary for right sided abdominal pain, patient says she has not been eating or drinking, patient endorses constipation, patient says something is wrong with her liver and controlling her sugars, poor historian         HPI: Tayla Lambetr is a 49-year-old female with PMH of T2DM, HTN, asthma, hypothyroidism, anxiety, depression, bipolar disorder,  ADHD, migraine headache, s/p Sharon-en-Y gastric bypass 1993 in [place] who presented to ED for evaluation of ongoing abdominal pain associated with nausea, vomiting and alternating diarrhea/constipation for last 2 years. Patient reports above symptoms are ongoing on and off for last 2 years when she lost 140 lbs. She reports decreased food intake due to aggravation of her symptoms. She denies blood in her emesis or stool. She went to hospitals in [facility], [place] and [place] couple of days for similar symptoms. Patient states doctor at [place] told "her liver is inflamed". She is a poor historian but it seems like she had a virtual visit with a GI specialist yesterday who recommended patient to come to [facility] main campus to be evaluated by specialist. She denies fever, chill, cough, sob, chest pain, dysuria, hematuria, focal weakness, numbness, dark stool or bleeding from other sites. Denies tobacco, alcohol or other illicit drug use. She lives at home in [facility] with a friend who is present at bedside.     CT abdomen with IV and oral contrast showed " status post Sharon-en-Y gastric bypass. Mild wall thickening and mucosal hyperenhancement suggesting inflammation of the gastric remnant/bypassed stomach. Consider endoscopy follow-up and correlation with symptoms.Status post cholecystectomy with similar mild intra and extrahepatic biliary ductal dilatation which may be seen with post cholecystectomy change. No radiopaque stones identified.       ED course: afebrile, borderline low SBP , oxygenating well on room air.   Labs notable for hypoglycemia glucose 77 >> 47 >> 53 >> 115 after dextrose 50 gm >> 25 gm >> D10 infusion. Patient also received NS 2 liter bolus, glucose tab and dilaudid 0.5 mg IV in ED. During my interview patient is awake, conversant and not in distress. She reports feeling hungry and ate a turkey sandwich w/o issue.                Past Medical History:   Diagnosis Date    ADHD (attention deficit hyperactivity disorder)     Anxiety     Asthma     Depression     Diabetes mellitus     Encounter for blood transfusion     Essential hypertension 2022    Hypothyroidism 2020    Kidney stone 2022    Migraine without status migrainosus, not intractable 2020    Rheumatoid arthritis involving both hands with positive rheumatoid factor 2018    T12 burst fracture     Tremors of nervous system 2020       Past Surgical History:   Procedure Laterality Date    ABDOMINAL SURGERY      APPENDECTOMY      BACK SURGERY  2018    T11 to L1 PSF w/T12 laminectomy     SECTION      CHOLECYSTECTOMY      GASTRIC BYPASS      HYSTERECTOMY      TONSILLECTOMY         Review of patient's allergies indicates:  No Known Allergies    No current facility-administered medications on file prior to encounter.     Current Outpatient Medications on File Prior to Encounter   Medication Sig    acetaminophen-codeine 300-30mg (TYLENOL #3) 300-30 mg Tab Take 1 tablet by mouth 2 (two) times daily.    albuterol (PROVENTIL/VENTOLIN HFA)  90 mcg/actuation inhaler Inhale 2 puffs into the lungs every 6 (six) hours as needed for Wheezing. Rescue    cariprazine (VRAYLAR) 1.5 mg Cap Take 1 capsule (1.5 mg total) by mouth every evening.    cetirizine (ZYRTEC) 10 MG tablet Take 1 tablet (10 mg total) by mouth once daily.    cyanocobalamin 1,000 mcg/mL injection Inject 1 mL (1,000 mcg total) into the muscle every 30 days. Inject 1 mL into the muscle every month    dicyclomine (BENTYL) 20 mg tablet Take 20 mg by mouth.    divalproex ER (DEPAKOTE ER) 250 MG 24 hr tablet Take 1 tablet (250 mg total) by mouth once daily.    ergocalciferol (VITAMIN D2) 50,000 unit Cap Take 50,000 Units by mouth every 7 days.    estradioL (ESTRACE) 1 MG tablet Take 1 tablet (1 mg total) by mouth once daily.    FLUoxetine 20 MG capsule Take 1 capsule (20 mg total) by mouth once daily.    fluticasone propionate (FLONASE) 50 mcg/actuation nasal spray 2 sprays (100 mcg total) by Each Nostril route once daily    gabapentin (NEURONTIN) 300 MG capsule Take 1 capsule (300 mg total) by mouth 3 (three) times daily.    hydrOXYzine (ATARAX) 50 MG tablet Take 1 tablet (50 mg total) by mouth 3 (three) times daily as needed for Anxiety.    levothyroxine (SYNTHROID) 125 MCG tablet Take 1 tablet (125 mcg total) by mouth before breakfast.    lurasidone (LATUDA) 60 mg Tab tablet Take 1 tablet (60 mg total) by mouth once daily.    olmesartan (BENICAR) 40 MG tablet Take 1 tablet (40 mg total) by mouth once daily.    pantoprazole (PROTONIX) 40 MG tablet Take 1 tablet (40 mg total) by mouth once daily.    temazepam (RESTORIL) 15 mg Cap Take 1 capsule (15 mg total) by mouth nightly as needed (insomnia).    tirzepatide (MOUNJARO) 15 mg/0.5 mL PnIj Inject 15 mg into the skin every 7 days.    tiZANidine (ZANAFLEX) 4 MG tablet Take 1 tablet (4 mg total) by mouth every evening.    topiramate (TOPAMAX) 200 MG Tab Take 1 tablet (200 mg total) by mouth 2 (two) times daily.    VYVANSE 70 mg capsule Take 1 capsule  (70 mg total) by mouth every morning.    azelastine 205.5 mcg (0.15 %) Spry 1 spray by Nasal route once daily.    diclofenac (VOLTAREN) 75 MG EC tablet Take 75 mg by mouth once daily.    meclizine (ANTIVERT) 50 MG tablet Take 25 mg by mouth every 8 (eight) hours.    permethrin (ELIMITE) 5 % cream Apply 1 application  topically once.    sucralfate (CARAFATE) 1 gram tablet Take 1 tablet (1 g total) by mouth 4 (four) times daily as needed (indigestion/acid reflux).    sumatriptan (IMITREX) 100 MG tablet Take 1 tablet (100 mg total) by mouth every 2 (two) hours as needed for Migraine. Do need exceed 2 tablets in 24 hours.    tamsulosin (FLOMAX) 0.4 mg Cap Take 1 capsule (0.4 mg total) by mouth once daily.    traMADoL (ULTRAM) 50 mg tablet Take 50 mg by mouth every 8 (eight) hours as needed.    triamcinolone acetonide 0.1% (KENALOG) 0.1 % ointment Apply topically 2 (two) times daily. for 14 days    TRINTELLIX 20 mg Tab Take 1 tablet (20 mg total) by mouth every evening.    valACYclovir (VALTREX) 1000 MG tablet Take 1 tablet (1,000 mg total) by mouth 2 (two) times daily. For 5 days with outbreaks.    zinc oxide 10 % Oint Apply 1 Application topically 2 (two) times a day.     Family History       Problem Relation (Age of Onset)    Breast cancer Maternal Aunt, Maternal Aunt, Maternal Grandmother    Diabetes Mother    Hypertension Father    Tremor Father, Brother          Tobacco Use    Smoking status: Never    Smokeless tobacco: Never   Substance and Sexual Activity    Alcohol use: Not Currently    Drug use: Never    Sexual activity: Not Currently     Review of Systems   Constitutional:  Positive for fatigue. Negative for activity change, appetite change, chills, diaphoresis and fever.   HENT:  Negative for congestion, dental problem, drooling, ear discharge, ear pain, facial swelling, hearing loss, mouth sores, nosebleeds, postnasal drip, rhinorrhea, sinus pressure, sneezing, sore throat, tinnitus, trouble swallowing and  voice change.    Eyes:  Negative for photophobia, pain, discharge, redness, itching and visual disturbance.   Respiratory:  Negative for apnea, cough, choking, chest tightness, shortness of breath, wheezing and stridor.    Cardiovascular:  Negative for chest pain, palpitations and leg swelling.   Gastrointestinal:  Positive for abdominal pain, constipation, diarrhea, nausea and vomiting. Negative for abdominal distention, anal bleeding, blood in stool and rectal pain.   Endocrine: Negative for cold intolerance, heat intolerance, polydipsia, polyphagia and polyuria.   Genitourinary:  Negative for decreased urine volume, difficulty urinating, dyspareunia, dysuria, enuresis, flank pain, frequency, genital sores, hematuria, menstrual problem, pelvic pain, urgency, vaginal bleeding, vaginal discharge and vaginal pain.   Musculoskeletal:  Negative for arthralgias, back pain, gait problem, joint swelling, myalgias, neck pain and neck stiffness.   Skin:  Negative for color change, pallor, rash and wound.   Allergic/Immunologic: Negative for environmental allergies, food allergies and immunocompromised state.   Neurological:  Negative for dizziness, tremors, seizures, syncope, facial asymmetry, speech difficulty, weakness, light-headedness, numbness and headaches.   Hematological:  Negative for adenopathy. Does not bruise/bleed easily.   Psychiatric/Behavioral:  Negative for agitation, behavioral problems, confusion, decreased concentration, dysphoric mood, hallucinations, self-injury, sleep disturbance and suicidal ideas. The patient is not nervous/anxious and is not hyperactive.      Objective:     Vital Signs (Most Recent):  Temp: 97.8 °F (36.6 °C) (12/06/24 2223)  Pulse: 74 (12/06/24 2315)  Resp: 11 (12/06/24 2315)  BP: (!) 90/50 (12/06/24 2315)  SpO2: 99 % (12/06/24 2315) Vital Signs (24h Range):  Temp:  [97.4 °F (36.3 °C)-97.9 °F (36.6 °C)] 97.8 °F (36.6 °C)  Pulse:  [69-82] 74  Resp:  [11-20] 11  SpO2:  [96 %-100 %]  99 %  BP: ()/(50-58) 90/50     Weight: 72.6 kg (160 lb)  Body mass index is 26.22 kg/m².     Physical Exam  Constitutional:       General: She is not in acute distress.     Appearance: She is well-developed. She is ill-appearing. She is not diaphoretic.   HENT:      Head: Normocephalic and atraumatic.      Right Ear: External ear normal.      Left Ear: External ear normal.      Nose: Nose normal.      Mouth/Throat:      Mouth: Mucous membranes are dry.      Pharynx: No oropharyngeal exudate.   Eyes:      General: No scleral icterus.     Conjunctiva/sclera: Conjunctivae normal.      Pupils: Pupils are equal, round, and reactive to light.   Neck:      Thyroid: No thyromegaly.      Vascular: No JVD.      Trachea: No tracheal deviation.   Cardiovascular:      Rate and Rhythm: Normal rate and regular rhythm.      Heart sounds: Normal heart sounds. No murmur heard.     No gallop.   Pulmonary:      Effort: Pulmonary effort is normal. No respiratory distress.      Breath sounds: Normal breath sounds. No wheezing or rales.   Chest:      Chest wall: No tenderness.   Abdominal:      General: Bowel sounds are normal. There is no distension.      Palpations: Abdomen is soft. There is no mass.      Tenderness: There is abdominal tenderness (mild TTP over RUQ and across lower abdomen). There is no guarding or rebound.   Genitourinary:     Vagina: No vaginal discharge.   Musculoskeletal:         General: No tenderness.      Cervical back: Neck supple.   Lymphadenopathy:      Cervical: No cervical adenopathy.   Skin:     General: Skin is warm and dry.      Coloration: Skin is not pale.      Findings: No erythema or rash.   Neurological:      Mental Status: She is alert and oriented to person, place, and time.      Cranial Nerves: No cranial nerve deficit.      Motor: No abnormal muscle tone.      Coordination: Coordination normal.      Deep Tendon Reflexes: Reflexes are normal and symmetric. Reflexes normal.      Comments:  Mild tremors of upper extremities    Psychiatric:         Behavior: Behavior normal.         Thought Content: Thought content normal.         Judgment: Judgment normal.      Comments: Flat affect               CRANIAL NERVES     CN III, IV, VI   Pupils are equal, round, and reactive to light.       Significant Labs: All pertinent labs within the past 24 hours have been reviewed.  Recent Lab Results  (Last 5 results in the past 24 hours)        12/06/24  2300   12/06/24  2228   12/06/24  2212   12/06/24  2135   12/06/24  1946        POCT Glucose 115   58   46   52   79                              Significant Imaging: I have reviewed all pertinent imaging results/findings within the past 24 hours.  Assessment/Plan:     * Hypoglycemia  -initial glucose 77 which dropped to 40s and improved with IV dextrose bolus   -started on D10 infusion and recent glucose 100  -hypoglycemia likely due to poor oral intake and use of Mounjaro   -will check serum insulin level, C-peptide, IGF, sulfonylurea screen, cortisol level to r/o other etiology of hypoglycemia   -frequent POC glucose check q 4 hours         Dyspepsia  -long standing on and off abdominal pain, N/V/D/constipation for last 2 years with 140 lbs weight loss   -likely from adverse reaction of Mounjaro vs gastritis of gastric remnant   -CT abdomen showed mild inflammatory changes of gastric remnant   -LFTs unremarkable   -mild TTP over RUQ and across lower abdomen on my exam w/o peritonitis   -will start on PPI   -consult GI for further evaluation and possible EGD in setting of N/V with significant weight loss       Hypotension  -borderline low SBP  in a patient with h/o HTN treated with olmesartan   -recent BP log at home showed SBP in low 100s  -she will likely no longer need BP medication given significant weight loss   -hypotension likely from dehydration in setting of poor oral intake   -no signs of bleeding, overt infection or sepsis   -SBP remains 85-90  despite 2 L NS bolus   -will give another 500 cc NS bolus and check lactic acid, procalcitonin, cortisol, TSH/FT4 for       Dehydration  -due to poor oral intake in setting of N/V  -received IVF       Hypothyroidism  -continue home synthroid   -check TSH/FT4      Bipolar disorder  -no acute issue, lives at home in [facility] with a friend   -denies SI/HI  -continue home Vraylar and depakote       Type 2 diabetes mellitus  Patient's FSGs are controlled on current medication regimen.  Last A1c reviewed-   Lab Results   Component Value Date    HGBA1C 4.8 11/24/2024     Most recent fingerstick glucose reviewed-   Recent Labs   Lab 12/07/24  0031 12/07/24  0118 12/07/24  0231 12/07/24  0359   POCTGLUCOSE 92 99 93 84     Current correctional scale  Low  Maintain anti-hyperglycemic dose as follows-   Antihyperglycemics (From admission, onward)      Start     Stop Route Frequency Ordered    12/07/24 0129  insulin aspart U-100 pen 0-5 Units         -- SubQ Before meals & nightly PRN 12/07/24 0030          Hold Oral hypoglycemics while patient is in the hospital.    Anxiety  -no acute issue and will continue home Fluoxetine, hydroxyzine prn       Depression  Patient has recurrent depression which is moderate and is currently controlled. Will Continue anti-depressant medications. We will not consult psychiatry at this time. Patient does not display psychosis at this time. Continue to monitor closely and adjust plan of care as needed.  -continue home fluoxetine, Latuda and Trintellix         Vitamin B12 deficiency (non anemic)  -on monthly vitamin B-12 injection      Attention deficit hyperactivity disorder (ADHD) with anxiety  -patient can take her home Vyvanse       Migraine without status migrainosus, not intractable  -no signs of acute flare up  -continue home topamax and imitrex prn         VTE Risk Mitigation (From admission, onward)           Ordered     IP VTE HIGH RISK PATIENT  Once         12/07/24 0030      Place sequential compression device  Until discontinued         12/07/24 0030                       On 12/07/2024, patient should be placed in hospital observation services under my care.             Alvina Yancey DO  Department of Hospital Medicine  [facility] - Emergency Dept

## 2024-12-07 NOTE — ASSESSMENT & PLAN NOTE
-initial glucose 77 which dropped to 40s and improved with IV dextrose bolus   -started on D10 infusion and recent glucose 100  -hypoglycemia likely due to poor oral intake and use of Mounjaro   -will check serum insulin level, C-peptide, IGF, sulfonylurea screen, cortisol level to r/o other etiology of hypoglycemia   -frequent POC glucose check q 4 hours

## 2024-12-07 NOTE — SUBJECTIVE & OBJECTIVE
Past Medical History:   Diagnosis Date    ADHD (attention deficit hyperactivity disorder)     Anxiety     Asthma     Depression     Diabetes mellitus     Encounter for blood transfusion     Essential hypertension 2022    Hypothyroidism 2020    Kidney stone 2022    Migraine without status migrainosus, not intractable 2020    Rheumatoid arthritis involving both hands with positive rheumatoid factor 2018    T12 burst fracture     Tremors of nervous system 2020       Past Surgical History:   Procedure Laterality Date    ABDOMINAL SURGERY      APPENDECTOMY      BACK SURGERY  2018    T11 to L1 PSF w/T12 laminectomy     SECTION      CHOLECYSTECTOMY      GASTRIC BYPASS      HYSTERECTOMY      TONSILLECTOMY         Review of patient's allergies indicates:  No Known Allergies    No current facility-administered medications on file prior to encounter.     Current Outpatient Medications on File Prior to Encounter   Medication Sig    acetaminophen-codeine 300-30mg (TYLENOL #3) 300-30 mg Tab Take 1 tablet by mouth 2 (two) times daily.    albuterol (PROVENTIL/VENTOLIN HFA) 90 mcg/actuation inhaler Inhale 2 puffs into the lungs every 6 (six) hours as needed for Wheezing. Rescue    cariprazine (VRAYLAR) 1.5 mg Cap Take 1 capsule (1.5 mg total) by mouth every evening.    cetirizine (ZYRTEC) 10 MG tablet Take 1 tablet (10 mg total) by mouth once daily.    cyanocobalamin 1,000 mcg/mL injection Inject 1 mL (1,000 mcg total) into the muscle every 30 days. Inject 1 mL into the muscle every month    dicyclomine (BENTYL) 20 mg tablet Take 20 mg by mouth.    divalproex ER (DEPAKOTE ER) 250 MG 24 hr tablet Take 1 tablet (250 mg total) by mouth once daily.    ergocalciferol (VITAMIN D2) 50,000 unit Cap Take 50,000 Units by mouth every 7 days.    estradioL (ESTRACE) 1 MG tablet Take 1 tablet (1 mg total) by mouth once daily.    FLUoxetine 20 MG capsule Take 1 capsule (20 mg total) by  mouth once daily.    fluticasone propionate (FLONASE) 50 mcg/actuation nasal spray 2 sprays (100 mcg total) by Each Nostril route once daily    gabapentin (NEURONTIN) 300 MG capsule Take 1 capsule (300 mg total) by mouth 3 (three) times daily.    hydrOXYzine (ATARAX) 50 MG tablet Take 1 tablet (50 mg total) by mouth 3 (three) times daily as needed for Anxiety.    levothyroxine (SYNTHROID) 125 MCG tablet Take 1 tablet (125 mcg total) by mouth before breakfast.    lurasidone (LATUDA) 60 mg Tab tablet Take 1 tablet (60 mg total) by mouth once daily.    olmesartan (BENICAR) 40 MG tablet Take 1 tablet (40 mg total) by mouth once daily.    pantoprazole (PROTONIX) 40 MG tablet Take 1 tablet (40 mg total) by mouth once daily.    temazepam (RESTORIL) 15 mg Cap Take 1 capsule (15 mg total) by mouth nightly as needed (insomnia).    tirzepatide (MOUNJARO) 15 mg/0.5 mL PnIj Inject 15 mg into the skin every 7 days.    tiZANidine (ZANAFLEX) 4 MG tablet Take 1 tablet (4 mg total) by mouth every evening.    topiramate (TOPAMAX) 200 MG Tab Take 1 tablet (200 mg total) by mouth 2 (two) times daily.    VYVANSE 70 mg capsule Take 1 capsule (70 mg total) by mouth every morning.    azelastine 205.5 mcg (0.15 %) Spry 1 spray by Nasal route once daily.    diclofenac (VOLTAREN) 75 MG EC tablet Take 75 mg by mouth once daily.    meclizine (ANTIVERT) 50 MG tablet Take 25 mg by mouth every 8 (eight) hours.    permethrin (ELIMITE) 5 % cream Apply 1 application  topically once.    sucralfate (CARAFATE) 1 gram tablet Take 1 tablet (1 g total) by mouth 4 (four) times daily as needed (indigestion/acid reflux).    sumatriptan (IMITREX) 100 MG tablet Take 1 tablet (100 mg total) by mouth every 2 (two) hours as needed for Migraine. Do need exceed 2 tablets in 24 hours.    tamsulosin (FLOMAX) 0.4 mg Cap Take 1 capsule (0.4 mg total) by mouth once daily.    traMADoL (ULTRAM) 50 mg tablet Take 50 mg by mouth every 8 (eight) hours as needed.     triamcinolone acetonide 0.1% (KENALOG) 0.1 % ointment Apply topically 2 (two) times daily. for 14 days    TRINTELLIX 20 mg Tab Take 1 tablet (20 mg total) by mouth every evening.    valACYclovir (VALTREX) 1000 MG tablet Take 1 tablet (1,000 mg total) by mouth 2 (two) times daily. For 5 days with outbreaks.    zinc oxide 10 % Oint Apply 1 Application topically 2 (two) times a day.     Family History       Problem Relation (Age of Onset)    Breast cancer Maternal Aunt, Maternal Aunt, Maternal Grandmother    Diabetes Mother    Hypertension Father    Tremor Father, Brother          Tobacco Use    Smoking status: Never    Smokeless tobacco: Never   Substance and Sexual Activity    Alcohol use: Not Currently    Drug use: Never    Sexual activity: Not Currently     Review of Systems   Constitutional:  Positive for fatigue. Negative for activity change, appetite change, chills, diaphoresis and fever.   HENT:  Negative for congestion, dental problem, drooling, ear discharge, ear pain, facial swelling, hearing loss, mouth sores, nosebleeds, postnasal drip, rhinorrhea, sinus pressure, sneezing, sore throat, tinnitus, trouble swallowing and voice change.    Eyes:  Negative for photophobia, pain, discharge, redness, itching and visual disturbance.   Respiratory:  Negative for apnea, cough, choking, chest tightness, shortness of breath, wheezing and stridor.    Cardiovascular:  Negative for chest pain, palpitations and leg swelling.   Gastrointestinal:  Positive for abdominal pain, constipation, diarrhea, nausea and vomiting. Negative for abdominal distention, anal bleeding, blood in stool and rectal pain.   Endocrine: Negative for cold intolerance, heat intolerance, polydipsia, polyphagia and polyuria.   Genitourinary:  Negative for decreased urine volume, difficulty urinating, dyspareunia, dysuria, enuresis, flank pain, frequency, genital sores, hematuria, menstrual problem, pelvic pain, urgency, vaginal bleeding, vaginal  discharge and vaginal pain.   Musculoskeletal:  Negative for arthralgias, back pain, gait problem, joint swelling, myalgias, neck pain and neck stiffness.   Skin:  Negative for color change, pallor, rash and wound.   Allergic/Immunologic: Negative for environmental allergies, food allergies and immunocompromised state.   Neurological:  Negative for dizziness, tremors, seizures, syncope, facial asymmetry, speech difficulty, weakness, light-headedness, numbness and headaches.   Hematological:  Negative for adenopathy. Does not bruise/bleed easily.   Psychiatric/Behavioral:  Negative for agitation, behavioral problems, confusion, decreased concentration, dysphoric mood, hallucinations, self-injury, sleep disturbance and suicidal ideas. The patient is not nervous/anxious and is not hyperactive.      Objective:     Vital Signs (Most Recent):  Temp: 97.8 °F (36.6 °C) (12/06/24 2223)  Pulse: 74 (12/06/24 2315)  Resp: 11 (12/06/24 2315)  BP: (!) 90/50 (12/06/24 2315)  SpO2: 99 % (12/06/24 2315) Vital Signs (24h Range):  Temp:  [97.4 °F (36.3 °C)-97.9 °F (36.6 °C)] 97.8 °F (36.6 °C)  Pulse:  [69-82] 74  Resp:  [11-20] 11  SpO2:  [96 %-100 %] 99 %  BP: ()/(50-58) 90/50     Weight: 72.6 kg (160 lb)  Body mass index is 26.22 kg/m².     Physical Exam  Constitutional:       General: She is not in acute distress.     Appearance: She is well-developed. She is ill-appearing. She is not diaphoretic.   HENT:      Head: Normocephalic and atraumatic.      Right Ear: External ear normal.      Left Ear: External ear normal.      Nose: Nose normal.      Mouth/Throat:      Mouth: Mucous membranes are dry.      Pharynx: No oropharyngeal exudate.   Eyes:      General: No scleral icterus.     Conjunctiva/sclera: Conjunctivae normal.      Pupils: Pupils are equal, round, and reactive to light.   Neck:      Thyroid: No thyromegaly.      Vascular: No JVD.      Trachea: No tracheal deviation.   Cardiovascular:      Rate and Rhythm: Normal  rate and regular rhythm.      Heart sounds: Normal heart sounds. No murmur heard.     No gallop.   Pulmonary:      Effort: Pulmonary effort is normal. No respiratory distress.      Breath sounds: Normal breath sounds. No wheezing or rales.   Chest:      Chest wall: No tenderness.   Abdominal:      General: Bowel sounds are normal. There is no distension.      Palpations: Abdomen is soft. There is no mass.      Tenderness: There is abdominal tenderness (mild TTP over RUQ and across lower abdomen). There is no guarding or rebound.   Genitourinary:     Vagina: No vaginal discharge.   Musculoskeletal:         General: No tenderness.      Cervical back: Neck supple.   Lymphadenopathy:      Cervical: No cervical adenopathy.   Skin:     General: Skin is warm and dry.      Coloration: Skin is not pale.      Findings: No erythema or rash.   Neurological:      Mental Status: She is alert and oriented to person, place, and time.      Cranial Nerves: No cranial nerve deficit.      Motor: No abnormal muscle tone.      Coordination: Coordination normal.      Deep Tendon Reflexes: Reflexes are normal and symmetric. Reflexes normal.      Comments: Mild tremors of upper extremities    Psychiatric:         Behavior: Behavior normal.         Thought Content: Thought content normal.         Judgment: Judgment normal.      Comments: Flat affect               CRANIAL NERVES     CN III, IV, VI   Pupils are equal, round, and reactive to light.       Significant Labs: All pertinent labs within the past 24 hours have been reviewed.  Recent Lab Results  (Last 5 results in the past 24 hours)        12/06/24  2300   12/06/24  2228   12/06/24  2212   12/06/24  2135   12/06/24  1946        POCT Glucose 115   58   46   52   79                              Significant Imaging: I have reviewed all pertinent imaging results/findings within the past 24 hours.

## 2024-12-07 NOTE — ASSESSMENT & PLAN NOTE
-borderline low SBP  in a patient with h/o HTN treated with olmesartan   -recent BP log at home showed SBP in low 100s  -she will likely no longer need BP medication given significant weight loss   -hypotension likely from dehydration in setting of poor oral intake   -no signs of bleeding, overt infection or sepsis   -SBP remains 85-90 despite 2 L NS bolus   -will give another 500 cc NS bolus and check lactic acid, procalcitonin, cortisol, TSH/FT4 for

## 2024-12-07 NOTE — ED NOTES
Assumed care of patient at this time. Patient is resting comfortably in bed in lowest, locked position with bed rails raised x2 in NAD. Pt remains in hospital gown, cardiac monitoring in place. Call light is within reach of patient. Patient denies further needs at this time.

## 2024-12-07 NOTE — ED NOTES
Assumed care of the patient. Report received from BETTINA Murphy. Pt on continuous cardiac monitoring, continuous pulse oximetry, and automatic BP cuff cycling. Pt in hospital gown, side rails up X2, bed low and locked, and call light is placed within reach. 1 family/visitors at bedside at this time. Pt denies any complaints or needs.

## 2024-12-07 NOTE — ED NOTES
Assumed care of patient at this time. Patient is resting comfortably in bed in lowest, locked position with bed rails raised x2 in NAD. Patient denies further needs at this time.

## 2024-12-07 NOTE — PLAN OF CARE
Problem: Adult Inpatient Plan of Care  Goal: Plan of Care Review  Outcome: Progressing  Goal: Absence of Hospital-Acquired Illness or Injury  Outcome: Progressing  Goal: Optimal Comfort and Wellbeing  Outcome: Progressing  Goal: Readiness for Transition of Care  Outcome: Progressing     Problem: Diabetes Comorbidity  Goal: Blood Glucose Level Within Targeted Range  Outcome: Not Progressing

## 2024-12-07 NOTE — ED NOTES
Assumed care of the patient. Report received from BETTINA Bailey. Pt on continuous cardiac monitoring, continuous pulse oximetry, and automatic BP cuff cycling Q30min. Pt in hospital gown, side rails up X2, bed low and locked, and call light is placed within reach. nO family/visitors at bedside at this time. Pt denies any complaints or needs.

## 2024-12-07 NOTE — PLAN OF CARE
12/07/24 0833   Post-Acute Status   Post-Acute Authorization Home Health   Home Health Status Referrals Sent   Discharge Delays None known at this time   Discharge Plan   Discharge Plan A Home Health     Met with patient to review discharge recommendation of home health and is agreeable to plan    Patient/family provided list of facilities in-network with patient's payor plan. Providers that are owned, operated, or affiliated with [facility] are included on the list.     Notified that referral sent to below listed facilities from in-network list based on proximity to home/family support:   [facility]  2. [facility]  [place] from [facility] called and they are unable to accept patient  3.  4.  5. (can send more than 5)    Patient/family instructed to identify preference.    Preferred Facility: (if more than 1, listed in order of descending preference)  No preference    If an additional preferred facility not listed above is identified, additional referral to be sent. If above facilities unable to accept, will send additional referrals to in-network providers.     Discharge Plan A and Plan B have been determined by review of patient's clinical status, future medical and therapeutic needs, and coverage/benefits for post-acute care in coordination with multidisciplinary team members.    Samara Basurto, YARON, MSW, LMSW, RSW   Case Management  [facility]  Email: [email or web address]

## 2024-12-08 PROBLEM — R10.9 CHRONIC ABDOMINAL PAIN: Status: ACTIVE | Noted: 2024-12-07

## 2024-12-08 PROBLEM — G89.29 CHRONIC ABDOMINAL PAIN: Status: ACTIVE | Noted: 2024-12-07

## 2024-12-08 LAB
ALBUMIN SERPL BCP-MCNC: 2.5 G/DL (ref 3.5–5.2)
ALP SERPL-CCNC: 195 U/L (ref 40–150)
ALT SERPL W/O P-5'-P-CCNC: 152 U/L (ref 10–44)
ANION GAP SERPL CALC-SCNC: 4 MMOL/L (ref 8–16)
AST SERPL-CCNC: 138 U/L (ref 10–40)
BASOPHILS # BLD AUTO: 0.04 K/UL (ref 0–0.2)
BASOPHILS NFR BLD: 1.8 % (ref 0–1.9)
BILIRUB SERPL-MCNC: 0.2 MG/DL (ref 0.1–1)
BUN SERPL-MCNC: 6 MG/DL (ref 6–20)
CALCIUM SERPL-MCNC: 8.2 MG/DL (ref 8.7–10.5)
CHLORIDE SERPL-SCNC: 113 MMOL/L (ref 95–110)
CO2 SERPL-SCNC: 22 MMOL/L (ref 23–29)
CORTIS SERPL-MCNC: 13.7 UG/DL
CORTIS SERPL-MCNC: 4.3 UG/DL
CREAT SERPL-MCNC: 0.8 MG/DL (ref 0.5–1.4)
DIFFERENTIAL METHOD BLD: ABNORMAL
EOSINOPHIL # BLD AUTO: 0.1 K/UL (ref 0–0.5)
EOSINOPHIL NFR BLD: 6.4 % (ref 0–8)
ERYTHROCYTE [DISTWIDTH] IN BLOOD BY AUTOMATED COUNT: 14.6 % (ref 11.5–14.5)
EST. GFR  (NO RACE VARIABLE): >60 ML/MIN/1.73 M^2
GLUCOSE SERPL-MCNC: 71 MG/DL (ref 70–110)
HCT VFR BLD AUTO: 33.4 % (ref 37–48.5)
HGB BLD-MCNC: 10.9 G/DL (ref 12–16)
IMM GRANULOCYTES # BLD AUTO: 0.01 K/UL (ref 0–0.04)
IMM GRANULOCYTES NFR BLD AUTO: 0.5 % (ref 0–0.5)
LYMPHOCYTES # BLD AUTO: 1.1 K/UL (ref 1–4.8)
LYMPHOCYTES NFR BLD: 48.2 % (ref 18–48)
MAGNESIUM SERPL-MCNC: 2 MG/DL (ref 1.6–2.6)
MCH RBC QN AUTO: 32.2 PG (ref 27–31)
MCHC RBC AUTO-ENTMCNC: 32.6 G/DL (ref 32–36)
MCV RBC AUTO: 99 FL (ref 82–98)
MONOCYTES # BLD AUTO: 0.3 K/UL (ref 0.3–1)
MONOCYTES NFR BLD: 14.7 % (ref 4–15)
NEUTROPHILS # BLD AUTO: 0.6 K/UL (ref 1.8–7.7)
NEUTROPHILS NFR BLD: 28.4 % (ref 38–73)
NRBC BLD-RTO: 0 /100 WBC
PHOSPHATE SERPL-MCNC: 3.7 MG/DL (ref 2.7–4.5)
PLATELET # BLD AUTO: 200 K/UL (ref 150–450)
PLATELET BLD QL SMEAR: ABNORMAL
PMV BLD AUTO: 10.4 FL (ref 9.2–12.9)
POCT GLUCOSE: 120 MG/DL (ref 70–110)
POCT GLUCOSE: 126 MG/DL (ref 70–110)
POCT GLUCOSE: 204 MG/DL (ref 70–110)
POCT GLUCOSE: 80 MG/DL (ref 70–110)
POCT GLUCOSE: 84 MG/DL (ref 70–110)
POCT GLUCOSE: 88 MG/DL (ref 70–110)
POCT GLUCOSE: 91 MG/DL (ref 70–110)
POCT GLUCOSE: 92 MG/DL (ref 70–110)
POCT GLUCOSE: 97 MG/DL (ref 70–110)
POTASSIUM SERPL-SCNC: 3.9 MMOL/L (ref 3.5–5.1)
PROT SERPL-MCNC: 5 G/DL (ref 6–8.4)
RBC # BLD AUTO: 3.38 M/UL (ref 4–5.4)
SODIUM SERPL-SCNC: 139 MMOL/L (ref 136–145)
WBC # BLD AUTO: 2.18 K/UL (ref 3.9–12.7)

## 2024-12-08 PROCEDURE — 36415 COLL VENOUS BLD VENIPUNCTURE: CPT

## 2024-12-08 PROCEDURE — 82533 TOTAL CORTISOL: CPT | Mod: 91 | Performed by: INTERNAL MEDICINE

## 2024-12-08 PROCEDURE — 84100 ASSAY OF PHOSPHORUS: CPT | Performed by: HOSPITALIST

## 2024-12-08 PROCEDURE — 63600175 PHARM REV CODE 636 W HCPCS: Performed by: INTERNAL MEDICINE

## 2024-12-08 PROCEDURE — 63600175 PHARM REV CODE 636 W HCPCS: Performed by: STUDENT IN AN ORGANIZED HEALTH CARE EDUCATION/TRAINING PROGRAM

## 2024-12-08 PROCEDURE — 96372 THER/PROPH/DIAG INJ SC/IM: CPT | Performed by: INTERNAL MEDICINE

## 2024-12-08 PROCEDURE — 96376 TX/PRO/DX INJ SAME DRUG ADON: CPT

## 2024-12-08 PROCEDURE — 84425 ASSAY OF VITAMIN B-1: CPT

## 2024-12-08 PROCEDURE — 25000242 PHARM REV CODE 250 ALT 637 W/ HCPCS: Performed by: HOSPITALIST

## 2024-12-08 PROCEDURE — 36415 COLL VENOUS BLD VENIPUNCTURE: CPT | Mod: XB | Performed by: INTERNAL MEDICINE

## 2024-12-08 PROCEDURE — 96366 THER/PROPH/DIAG IV INF ADDON: CPT

## 2024-12-08 PROCEDURE — 85025 COMPLETE CBC W/AUTO DIFF WBC: CPT | Performed by: HOSPITALIST

## 2024-12-08 PROCEDURE — 25000003 PHARM REV CODE 250: Performed by: STUDENT IN AN ORGANIZED HEALTH CARE EDUCATION/TRAINING PROGRAM

## 2024-12-08 PROCEDURE — 83735 ASSAY OF MAGNESIUM: CPT | Performed by: HOSPITALIST

## 2024-12-08 PROCEDURE — 11000001 HC ACUTE MED/SURG PRIVATE ROOM

## 2024-12-08 PROCEDURE — 63600175 PHARM REV CODE 636 W HCPCS: Performed by: FAMILY MEDICINE

## 2024-12-08 PROCEDURE — 96375 TX/PRO/DX INJ NEW DRUG ADDON: CPT

## 2024-12-08 PROCEDURE — 96361 HYDRATE IV INFUSION ADD-ON: CPT

## 2024-12-08 PROCEDURE — 25000003 PHARM REV CODE 250: Performed by: HOSPITALIST

## 2024-12-08 PROCEDURE — 80053 COMPREHEN METABOLIC PANEL: CPT | Performed by: HOSPITALIST

## 2024-12-08 PROCEDURE — 25000003 PHARM REV CODE 250: Performed by: INTERNAL MEDICINE

## 2024-12-08 RX ORDER — COSYNTROPIN 0.25 MG/ML
0.25 INJECTION, POWDER, FOR SOLUTION INTRAMUSCULAR; INTRAVENOUS ONCE
Status: COMPLETED | OUTPATIENT
Start: 2024-12-08 | End: 2024-12-08

## 2024-12-08 RX ORDER — ACETAMINOPHEN 325 MG/1
650 TABLET ORAL 3 TIMES DAILY
Status: DISCONTINUED | OUTPATIENT
Start: 2024-12-08 | End: 2024-12-11 | Stop reason: HOSPADM

## 2024-12-08 RX ORDER — DEXTROSE MONOHYDRATE 100 MG/ML
INJECTION, SOLUTION INTRAVENOUS CONTINUOUS
Status: DISCONTINUED | OUTPATIENT
Start: 2024-12-08 | End: 2024-12-09

## 2024-12-08 RX ORDER — METHYLPHENIDATE HYDROCHLORIDE 5 MG/1
30 TABLET ORAL DAILY
Status: DISCONTINUED | OUTPATIENT
Start: 2024-12-09 | End: 2024-12-11 | Stop reason: HOSPADM

## 2024-12-08 RX ORDER — DOCUSATE SODIUM 100 MG
200 CAPSULE ORAL
Status: DISCONTINUED | OUTPATIENT
Start: 2024-12-08 | End: 2024-12-10

## 2024-12-08 RX ORDER — ENOXAPARIN SODIUM 100 MG/ML
40 INJECTION SUBCUTANEOUS EVERY 24 HOURS
Status: DISCONTINUED | OUTPATIENT
Start: 2024-12-08 | End: 2024-12-11 | Stop reason: HOSPADM

## 2024-12-08 RX ORDER — HYDROMORPHONE HYDROCHLORIDE 1 MG/ML
0.5 INJECTION, SOLUTION INTRAMUSCULAR; INTRAVENOUS; SUBCUTANEOUS EVERY 4 HOURS PRN
Status: DISCONTINUED | OUTPATIENT
Start: 2024-12-08 | End: 2024-12-09

## 2024-12-08 RX ADMIN — DIVALPROEX SODIUM 250 MG: 250 TABLET, EXTENDED RELEASE ORAL at 09:12

## 2024-12-08 RX ADMIN — ACETAMINOPHEN 650 MG: 325 TABLET ORAL at 09:12

## 2024-12-08 RX ADMIN — Medication 6 MG: at 09:12

## 2024-12-08 RX ADMIN — HYDROXYZINE HYDROCHLORIDE 50 MG: 25 TABLET, FILM COATED ORAL at 11:12

## 2024-12-08 RX ADMIN — Medication 200 ML: at 09:12

## 2024-12-08 RX ADMIN — PANTOPRAZOLE SODIUM 40 MG: 40 TABLET, DELAYED RELEASE ORAL at 08:12

## 2024-12-08 RX ADMIN — FLUTICASONE PROPIONATE 100 MCG: 50 SPRAY, METERED NASAL at 08:12

## 2024-12-08 RX ADMIN — TAMSULOSIN HYDROCHLORIDE 0.4 MG: 0.4 CAPSULE ORAL at 08:12

## 2024-12-08 RX ADMIN — TOPIRAMATE 200 MG: 100 TABLET, FILM COATED ORAL at 08:12

## 2024-12-08 RX ADMIN — POLYETHYLENE GLYCOL 3350 17 G: 17 POWDER, FOR SOLUTION ORAL at 09:12

## 2024-12-08 RX ADMIN — GABAPENTIN 300 MG: 300 CAPSULE ORAL at 08:12

## 2024-12-08 RX ADMIN — CETIRIZINE HYDROCHLORIDE 10 MG: 10 TABLET, FILM COATED ORAL at 08:12

## 2024-12-08 RX ADMIN — ALUMINUM HYDROXIDE, MAGNESIUM HYDROXIDE, AND SIMETHICONE 30 ML: 200; 200; 20 SUSPENSION ORAL at 11:12

## 2024-12-08 RX ADMIN — GABAPENTIN 300 MG: 300 CAPSULE ORAL at 04:12

## 2024-12-08 RX ADMIN — Medication 200 ML: at 04:12

## 2024-12-08 RX ADMIN — TRAMADOL HYDROCHLORIDE 50 MG: 50 TABLET, COATED ORAL at 05:12

## 2024-12-08 RX ADMIN — GABAPENTIN 300 MG: 300 CAPSULE ORAL at 09:12

## 2024-12-08 RX ADMIN — Medication 200 ML: at 12:12

## 2024-12-08 RX ADMIN — SENNOSIDES 8.6 MG: 8.6 TABLET, FILM COATED ORAL at 08:12

## 2024-12-08 RX ADMIN — COSYNTROPIN 0.25 MG: 0.25 INJECTION, POWDER, LYOPHILIZED, FOR SOLUTION INTRAMUSCULAR; INTRAVENOUS at 01:12

## 2024-12-08 RX ADMIN — DICYCLOMINE HYDROCHLORIDE 20 MG: 20 TABLET ORAL at 08:12

## 2024-12-08 RX ADMIN — ALUMINUM HYDROXIDE, MAGNESIUM HYDROXIDE, AND SIMETHICONE 30 ML: 200; 200; 20 SUSPENSION ORAL at 05:12

## 2024-12-08 RX ADMIN — HYDROMORPHONE HYDROCHLORIDE 0.5 MG: 1 INJECTION, SOLUTION INTRAMUSCULAR; INTRAVENOUS; SUBCUTANEOUS at 05:12

## 2024-12-08 RX ADMIN — HYDROMORPHONE HYDROCHLORIDE 0.5 MG: 1 INJECTION, SOLUTION INTRAMUSCULAR; INTRAVENOUS; SUBCUTANEOUS at 09:12

## 2024-12-08 RX ADMIN — METHYLPHENIDATE HYDROCHLORIDE 30 MG: 5 TABLET ORAL at 08:12

## 2024-12-08 RX ADMIN — AZELASTINE 137 MCG: 1 SPRAY, METERED NASAL at 10:12

## 2024-12-08 RX ADMIN — TRAMADOL HYDROCHLORIDE 50 MG: 50 TABLET, COATED ORAL at 11:12

## 2024-12-08 RX ADMIN — ESTRADIOL 1 MG: 0.5 TABLET ORAL at 08:12

## 2024-12-08 RX ADMIN — SODIUM CHLORIDE, SODIUM LACTATE, POTASSIUM CHLORIDE, CALCIUM CHLORIDE AND DEXTROSE MONOHYDRATE 1000 ML: 5; 600; 310; 30; 20 INJECTION, SOLUTION INTRAVENOUS at 12:12

## 2024-12-08 RX ADMIN — DEXTROSE MONOHYDRATE: 100 INJECTION, SOLUTION INTRAVENOUS at 12:12

## 2024-12-08 RX ADMIN — FLUOXETINE HYDROCHLORIDE 20 MG: 20 CAPSULE ORAL at 08:12

## 2024-12-08 RX ADMIN — ENOXAPARIN SODIUM 40 MG: 40 INJECTION SUBCUTANEOUS at 04:12

## 2024-12-08 RX ADMIN — LEVOTHYROXINE SODIUM 125 MCG: 25 TABLET ORAL at 05:12

## 2024-12-08 RX ADMIN — ACETAMINOPHEN 650 MG: 325 TABLET ORAL at 04:12

## 2024-12-08 RX ADMIN — TOPIRAMATE 200 MG: 100 TABLET, FILM COATED ORAL at 09:12

## 2024-12-08 RX ADMIN — LURASIDONE HYDROCHLORIDE 60 MG: 40 TABLET, FILM COATED ORAL at 09:12

## 2024-12-08 RX ADMIN — SENNOSIDES 8.6 MG: 8.6 TABLET, FILM COATED ORAL at 09:12

## 2024-12-08 RX ADMIN — POLYETHYLENE GLYCOL 3350 17 G: 17 POWDER, FOR SOLUTION ORAL at 08:12

## 2024-12-08 RX ADMIN — HYDROMORPHONE HYDROCHLORIDE 0.5 MG: 1 INJECTION, SOLUTION INTRAMUSCULAR; INTRAVENOUS; SUBCUTANEOUS at 01:12

## 2024-12-08 NOTE — CARE UPDATE
"RAPID RESPONSE NURSE CHART REVIEW        Chart Reviewed: 12/08/2024, 12:09 AM    MRN: [id number]  Bed: 1143/1143 A    Dx: Hypoglycemia    Tayla Lambert has a past medical history of ADHD (attention deficit hyperactivity disorder), Anxiety, Asthma, Depression, Diabetes mellitus, Encounter for blood transfusion, Essential hypertension, Hypothyroidism, Kidney stone, Migraine without status migrainosus, not intractable, Rheumatoid arthritis involving both hands with positive rheumatoid factor, T12 burst fracture, and Tremors of nervous system.    Last VS: BP (!) 86/55 (BP Location: Right arm, Patient Position: Lying)   Pulse 64   Temp 97.6 °F (36.4 °C) (Oral)   Resp 18   Ht 5' 5.5" (1.664 m)   Wt 74.9 kg (165 lb 2 oz)   SpO2 96%   Breastfeeding No   BMI 27.06 kg/m²     24H Vital Sign Range:  Temp:  [97.6 °F (36.4 °C)-98.5 °F (36.9 °C)]   Pulse:  [56-76]   Resp:  [10-22]   BP: ()/(42-66)   SpO2:  [96 %-100 %]     Level of Consciousness (AVPU): alert    Recent Labs     12/06/24  1635 12/07/24  0337   WBC 3.88* 3.14*   HGB 12.7 11.2*   HCT 39.0 36.2*    281       Recent Labs     12/06/24  1635 12/07/24  0337    139   K 3.8 4.9   * 116*   CO2 19* 16*   BUN 13 11   CREATININE 0.8 0.8   GLU 77 74   PHOS  --  4.3   MG 2.3 2.0        No results for input(s): "PH", "PCO2", "PO2", "HCO3", "POCSATURATED", "BE" in the last 72 hours.     OXYGEN:             MEWS score: 2    {RN Choice:60505} No additional concerns verbalized at this time. Instructed to call {RRN Phone:[id number]} for further concerns or assistance.    Yu Zuleta RN       "

## 2024-12-08 NOTE — PROGRESS NOTES
Hospital Medicine  Progress note    Team: [facility] MED A Maryanne Camargo MD  Admit Date: 12/6/2024  Code status: Full Code    Principal Problem:  Hypoglycemia    Interval hx:  feeling poorly. Continues with abdominal pain and nausea. Pain comes first follow by the nausea. Eating hurts and then gets nauseated. Even fluids give pain. She nearly feel while nursing was getting standing weight patient felt light-headed and then weak    PEx  Temp:  [97.4 °F (36.3 °C)-98.5 °F (36.9 °C)]   Pulse:  [56-71]   Resp:  [17-18]   BP: ()/(42-76)   SpO2:  [95 %-98 %]     Intake/Output Summary (Last 24 hours) at 12/8/2024 1252  Last data filed at 12/8/2024 1206  Gross per 24 hour   Intake 200 ml   Output --   Net 200 ml     General Appearance: no acute distress, WD, ill-appearing  Heart: regular rate and rhythm, no heave  Respiratory: Normal respiratory effort, symmetric excursion, bilateral vesicular breath sounds   Abdomen: Soft, non-tender; bowel sounds active  Skin: intact, no rash, no ulcers  Neurologic:  No focal numbness or weakness  Mental status: Alert, oriented x 4, affect appropriate    Recent Labs   Lab 12/06/24  1635 12/07/24  0337 12/08/24  0332   WBC 3.88* 3.14* 2.18*   HGB 12.7 11.2* 10.9*   HCT 39.0 36.2* 33.4*    281 200     Recent Labs   Lab 12/06/24  1635 12/07/24  0337 12/08/24  0332    139 139   K 3.8 4.9 3.9   * 116* 113*   CO2 19* 16* 22*   BUN 13 11 6   CREATININE 0.8 0.8 0.8   GLU 77 74 71   CALCIUM 9.0 7.9* 8.2*   MG 2.3 2.0 2.0   PHOS  --  4.3 3.7   LIPASE 11 13  --      Recent Labs   Lab 12/06/24  1635 12/07/24  0337 12/08/24  0332   ALKPHOS 150 120 195*   ALT 72* 72* 152*   AST 30 72* 138*   ALBUMIN 3.6 2.8* 2.5*   PROT 7.1 5.9* 5.0*   BILITOT 0.2 0.2 0.2        Recent Labs   Lab 12/07/24  2147 12/07/24  2342 12/08/24  0230 12/08/24  0432 12/08/24  0727 12/08/24  1122   POCTGLUCOSE 204* 79 120* 92 88 84       Scheduled Meds:   aluminum-magnesium hydroxide-simethicone  30 mL Oral  QID (AC & HS)    azelastine  1 spray Nasal BID    cetirizine  10 mg Oral Daily    cosyntropin  0.25 mg Intravenous Once    dicyclomine  20 mg Oral TID    divalproex ER  250 mg Oral Daily    electrolytes-dextrose  200 mL Oral Q4H    estradioL  1 mg Oral Daily    FLUoxetine  20 mg Oral Daily    fluticasone propionate  2 spray Each Nostril Daily    gabapentin  300 mg Oral TID    levothyroxine  125 mcg Oral Before breakfast    lurasidone  60 mg Oral Daily    methylphenidate HCl  30 mg Oral BID WM    pantoprazole  40 mg Oral Daily    polyethylene glycol  17 g Oral BID    senna  8.6 mg Oral BID    tamsulosin  0.4 mg Oral Daily    topiramate  200 mg Oral BID     Continuous Infusions:   D10W   Intravenous Continuous 50 mL/hr at 12/08/24 1205 New Bag at 12/08/24 1205     As Needed:    Current Facility-Administered Medications:     acetaminophen, 650 mg, Oral, Q6H PRN    albuterol, 2 puff, Inhalation, Q6H PRN    dextrose 10%, 12.5 g, Intravenous, PRN    dextrose 10%, 25 g, Intravenous, PRN    glucagon (human recombinant), 1 mg, Intramuscular, PRN    glucose, 16 g, Oral, PRN    glucose, 24 g, Oral, PRN    glucose, 8 g, Oral, PRN    HYDROmorphone, 0.5 mg, Intravenous, Q4H PRN    hydrOXYzine, 50 mg, Oral, TID PRN    insulin aspart U-100, 0-5 Units, Subcutaneous, QID (AC + HS) PRN    melatonin, 6 mg, Oral, Nightly PRN    naloxone, 0.02 mg, Intravenous, PRN    ondansetron, 4 mg, Intravenous, Q8H PRN    sodium chloride 0.9%, 10 mL, Intravenous, Q12H PRN    sumatriptan, 100 mg, Oral, Q12H PRN    tiZANidine, 4 mg, Oral, Nightly PRN    traMADoL, 50 mg, Oral, Q6H PRN    Assessment and Plan  / Problems managed today    * Hypoglycemia  -likely due to combinations of malnutrition and Mounjaro   -Mounjaro should be discontinued  -will check serum insulin level, C-peptide, IGF, sulfonylurea screen, cortisol level to r/o other etiology of hypoglycemia - unremarkable  - will do cortisol stim test    Hypotension  -borderline low SBP  in  a patient with h/o HTN treated with olmesartan   -recent BP log at home showed SBP in low 100s  -she will likely no longer need BP medication given significant weight loss   -no signs of bleeding, overt infection or sepsis   -SBP remains 85-90 despite 2 L NS bolus   -will give another 500 cc NS bolus and check lactic acid (WNL), procalcitonin (WNL), cortisol (acceptable for AM levels), TSH/FT4 (WNL)  - not improving despite boluses  - likely due to malnutrition as stated above  - cortisol stim test to rule out adrenal insufficiency    Type 2 diabetes mellitus  Patient's FSGs are controlled on current medication regimen.  Last A1c reviewed-   Lab Results   Component Value Date    HGBA1C 4.7 12/07/2024     Most recent fingerstick glucose reviewed-   Recent Labs   Lab 12/08/24  0230 12/08/24  0432 12/08/24  0727 12/08/24  1122   POCTGLUCOSE 120* 92 88 84       Current correctional scale  Low  Maintain anti-hyperglycemic dose as follows-   Antihyperglycemics (From admission, onward)      Start     Stop Route Frequency Ordered    12/07/24 0129  insulin aspart U-100 pen 0-5 Units         -- SubQ Before meals & nightly PRN 12/07/24 0030          Hold Oral hypoglycemics while patient is in the hospital.  Stop mounjaro jeff to persisting hypoglycemia    Bipolar disorder  -no acute issue, lives at home in [facility] with a friend   -denies SI/HI  -continue home Vraylar and depakote       Chronic abdominal pain  -long standing on and off abdominal pain, N/V/D/constipation for last 2 years with 140 lbs weight loss   -likely from adverse reaction of Mounjaro vs gastritis of gastric remnant and/or funcitonal  -CT abdomen showed mild inflammatory changes of gastric remnant   -LFTs unremarkable   -mild TTP over RUQ and across lower abdomen on my exam w/o peritonitis   -will start on PPI   -consult GI for further evaluation and possible EGD in setting of N/V with significant weight loss   - MRCP as recommended by   Therapondas  - on hydromorphone but patient not eating - worsens GI motility and patient high risk for developing dependence  - stop maalox and dicylcomine as adding to polypharmacy and not working    Dehydration  -due to poor oral intake in setting of N/V  -received IVF   - resolved    Vitamin B12 deficiency (non anemic)  -on monthly vitamin B-12 injection      Attention deficit hyperactivity disorder (ADHD) with anxiety  -patient can take her home Vyvanse     Migraine without status migrainosus, not intractable  -no signs of acute flare up  -continue home topamax and imitrex prn       Anxiety  -no acute issue and will continue home Fluoxetine, hydroxyzine prn       Depression  Patient has recurrent depression which is moderate and is currently controlled. Will Continue anti-depressant medications. We will not consult psychiatry at this time. Patient does not display psychosis at this time. Continue to monitor closely and adjust plan of care as needed.  -continue home fluoxetine, Latuda and Trintellix         Hypothyroidism  -continue home synthroid   -check TSH/FT4        Diet:  liquid diet  GI PPx: not needed  DVT PPx:  enoxaparin  Airways: room air  Wounds: none    Goals of Care:  Return to prior functional status     Discharge Planning   JASPER:    Is the patient medically ready for discharge?:     Reason for patient still in hospital (select all that apply): Patient trending condition and Treatment  Discharge Plan A: Home Health, Home   Discharge Delays: None known at this time    Maryanne Camargo MD

## 2024-12-08 NOTE — ASSESSMENT & PLAN NOTE
-long standing on and off abdominal pain, N/V/D/constipation for last 2 years with 140 lbs weight loss   -likely from adverse reaction of Mounjaro vs gastritis of gastric remnant and/or funcitonal  -CT abdomen showed mild inflammatory changes of gastric remnant   -LFTs unremarkable   -mild TTP over RUQ and across lower abdomen on my exam w/o peritonitis   -will start on PPI   -consult GI for further evaluation and possible EGD in setting of N/V with significant weight loss   - MRCP as recommended by Dr. Jack   Aborted MRCP when done due to anxiety   Repeat MRCP with ativan prn  - on hydromorphone but patient not eating - worsens GI motility and patient high risk for developing dependence  - stop maalox and dicylcomine as adding to polypharmacy and not working  - EGD showed esophagitis dessicans - protonix 40 mg BID and carafate 1 g QID   Likely not source of abdominal pain

## 2024-12-08 NOTE — ASSESSMENT & PLAN NOTE
Patient's FSGs are controlled on current medication regimen.  Last A1c reviewed-   Lab Results   Component Value Date    HGBA1C 4.7 12/07/2024     Most recent fingerstick glucose reviewed-   Recent Labs   Lab 12/08/24  0230 12/08/24  0432 12/08/24  0727 12/08/24  1122   POCTGLUCOSE 120* 92 88 84       Current correctional scale  Low  Maintain anti-hyperglycemic dose as follows-   Antihyperglycemics (From admission, onward)      Start     Stop Route Frequency Ordered    12/07/24 0129  insulin aspart U-100 pen 0-5 Units         -- SubQ Before meals & nightly PRN 12/07/24 0030          Hold Oral hypoglycemics while patient is in the hospital.  Stop mounjaro jeff to persisting hypoglycemia

## 2024-12-08 NOTE — PLAN OF CARE
Problem: Adult Inpatient Plan of Care  Goal: Plan of Care Review  Outcome: Progressing  Goal: Absence of Hospital-Acquired Illness or Injury  Outcome: Progressing  Goal: Optimal Comfort and Wellbeing  Outcome: Progressing  Goal: Readiness for Transition of Care  Outcome: Progressing     Problem: Diabetes Comorbidity  Goal: Blood Glucose Level Within Targeted Range  Outcome: Progressing

## 2024-12-08 NOTE — ASSESSMENT & PLAN NOTE
-borderline low SBP  in a patient with h/o HTN treated with olmesartan   -recent BP log at home showed SBP in low 100s  -she will likely no longer need BP medication given significant weight loss   -no signs of bleeding, overt infection or sepsis   -SBP remains 85-90 despite 2 L NS bolus   -will give another 500 cc NS bolus and check lactic acid (WNL), procalcitonin (WNL), cortisol (acceptable for AM levels), TSH/FT4 (WNL)  - not improving despite boluses  - likely due to malnutrition as stated above  - cortisol stim test to rule out adrenal insufficiency - endocrinology consulted  - due to orthostasis and two falls/near falls, will start midodrine 10 mg TID while here and undergoing work up  - PT/OT

## 2024-12-08 NOTE — ASSESSMENT & PLAN NOTE
-likely due to combinations of malnutrition and Mounjaro   -Mounjaro should be discontinued  - will do cortisol stim test  - endocrinology consulted   will check serum insulin level, C-peptide, IGF, sulfonylurea screen for glc <50

## 2024-12-08 NOTE — NURSING
Pt's BP is 88/61. blood sugar 67 with D10 infusing @ 50 ml/hr. Manual BP 90/42. pt stated she's not feeling well. lightheaded and dizzy. Dr. Garcia notified. Order for 250ml D10 bolus and increase continuous D10 to 100 ml/hr. Care ongoing.    2140:  after bolus.     2344: BG 79 & BP 86/55. Dr. Garcia notified. Order for 1L Bolus of D5 LR. RN will reassess BG and BP after bolus. Care ongoing.     0230:  BP 98/63 after D5 LR 1L bolus. Care ongoing. D10 continuous @ 100 ml restarted.

## 2024-12-09 ENCOUNTER — ANESTHESIA EVENT (OUTPATIENT)
Dept: ENDOSCOPY | Facility: HOSPITAL | Age: 49
DRG: 638 | End: 2024-12-09
Payer: COMMERCIAL

## 2024-12-09 LAB
ALBUMIN SERPL BCP-MCNC: 2.9 G/DL (ref 3.5–5.2)
ALBUMIN SERPL BCP-MCNC: 3 G/DL (ref 3.5–5.2)
ALP SERPL-CCNC: 243 U/L (ref 40–150)
ALP SERPL-CCNC: 243 U/L (ref 40–150)
ALT SERPL W/O P-5'-P-CCNC: 146 U/L (ref 10–44)
ALT SERPL W/O P-5'-P-CCNC: 167 U/L (ref 10–44)
ANION GAP SERPL CALC-SCNC: 6 MMOL/L (ref 8–16)
ANION GAP SERPL CALC-SCNC: 7 MMOL/L (ref 8–16)
AST SERPL-CCNC: 114 U/L (ref 10–40)
AST SERPL-CCNC: 81 U/L (ref 10–40)
BASOPHILS # BLD AUTO: 0.02 K/UL (ref 0–0.2)
BASOPHILS # BLD AUTO: 0.04 K/UL (ref 0–0.2)
BASOPHILS NFR BLD: 0.5 % (ref 0–1.9)
BASOPHILS NFR BLD: 0.9 % (ref 0–1.9)
BILIRUB SERPL-MCNC: 0.2 MG/DL (ref 0.1–1)
BILIRUB SERPL-MCNC: 0.2 MG/DL (ref 0.1–1)
BUN SERPL-MCNC: 5 MG/DL (ref 6–20)
BUN SERPL-MCNC: 5 MG/DL (ref 6–20)
CALCIUM SERPL-MCNC: 8.5 MG/DL (ref 8.7–10.5)
CALCIUM SERPL-MCNC: 8.7 MG/DL (ref 8.7–10.5)
CHLORIDE SERPL-SCNC: 112 MMOL/L (ref 95–110)
CHLORIDE SERPL-SCNC: 112 MMOL/L (ref 95–110)
CO2 SERPL-SCNC: 20 MMOL/L (ref 23–29)
CO2 SERPL-SCNC: 23 MMOL/L (ref 23–29)
CORTIS SERPL-MCNC: 20 UG/DL
CORTIS SERPL-MCNC: 24.5 UG/DL
CORTIS SERPL-MCNC: 6.7 UG/DL
CREAT SERPL-MCNC: 0.8 MG/DL (ref 0.5–1.4)
CREAT SERPL-MCNC: 0.8 MG/DL (ref 0.5–1.4)
DIFFERENTIAL METHOD BLD: ABNORMAL
DIFFERENTIAL METHOD BLD: ABNORMAL
EOSINOPHIL # BLD AUTO: 0.2 K/UL (ref 0–0.5)
EOSINOPHIL # BLD AUTO: 0.2 K/UL (ref 0–0.5)
EOSINOPHIL NFR BLD: 3.4 % (ref 0–8)
EOSINOPHIL NFR BLD: 4.1 % (ref 0–8)
ERYTHROCYTE [DISTWIDTH] IN BLOOD BY AUTOMATED COUNT: 14.6 % (ref 11.5–14.5)
ERYTHROCYTE [DISTWIDTH] IN BLOOD BY AUTOMATED COUNT: 14.8 % (ref 11.5–14.5)
EST. GFR  (NO RACE VARIABLE): >60 ML/MIN/1.73 M^2
EST. GFR  (NO RACE VARIABLE): >60 ML/MIN/1.73 M^2
GLUCOSE SERPL-MCNC: 71 MG/DL (ref 70–110)
GLUCOSE SERPL-MCNC: 87 MG/DL (ref 70–110)
HCT VFR BLD AUTO: 37.1 % (ref 37–48.5)
HCT VFR BLD AUTO: 38.1 % (ref 37–48.5)
HGB BLD-MCNC: 11.7 G/DL (ref 12–16)
HGB BLD-MCNC: 11.7 G/DL (ref 12–16)
IMM GRANULOCYTES # BLD AUTO: 0.01 K/UL (ref 0–0.04)
IMM GRANULOCYTES # BLD AUTO: 0.02 K/UL (ref 0–0.04)
IMM GRANULOCYTES NFR BLD AUTO: 0.3 % (ref 0–0.5)
IMM GRANULOCYTES NFR BLD AUTO: 0.4 % (ref 0–0.5)
LYMPHOCYTES # BLD AUTO: 1.2 K/UL (ref 1–4.8)
LYMPHOCYTES # BLD AUTO: 1.4 K/UL (ref 1–4.8)
LYMPHOCYTES NFR BLD: 29.1 % (ref 18–48)
LYMPHOCYTES NFR BLD: 32.4 % (ref 18–48)
MAGNESIUM SERPL-MCNC: 2.1 MG/DL (ref 1.6–2.6)
MCH RBC QN AUTO: 31.7 PG (ref 27–31)
MCH RBC QN AUTO: 31.8 PG (ref 27–31)
MCHC RBC AUTO-ENTMCNC: 30.7 G/DL (ref 32–36)
MCHC RBC AUTO-ENTMCNC: 31.5 G/DL (ref 32–36)
MCV RBC AUTO: 101 FL (ref 82–98)
MCV RBC AUTO: 104 FL (ref 82–98)
MONOCYTES # BLD AUTO: 0.2 K/UL (ref 0.3–1)
MONOCYTES # BLD AUTO: 0.3 K/UL (ref 0.3–1)
MONOCYTES NFR BLD: 5.1 % (ref 4–15)
MONOCYTES NFR BLD: 8 % (ref 4–15)
NEUTROPHILS # BLD AUTO: 2 K/UL (ref 1.8–7.7)
NEUTROPHILS # BLD AUTO: 2.9 K/UL (ref 1.8–7.7)
NEUTROPHILS NFR BLD: 54.7 % (ref 38–73)
NEUTROPHILS NFR BLD: 61.1 % (ref 38–73)
NRBC BLD-RTO: 0 /100 WBC
NRBC BLD-RTO: 0 /100 WBC
PHOSPHATE SERPL-MCNC: 4.2 MG/DL (ref 2.7–4.5)
PLATELET # BLD AUTO: 238 K/UL (ref 150–450)
PLATELET # BLD AUTO: 255 K/UL (ref 150–450)
PMV BLD AUTO: 10.4 FL (ref 9.2–12.9)
PMV BLD AUTO: 10.4 FL (ref 9.2–12.9)
POCT GLUCOSE: 100 MG/DL (ref 70–110)
POCT GLUCOSE: 104 MG/DL (ref 70–110)
POCT GLUCOSE: 117 MG/DL (ref 70–110)
POCT GLUCOSE: 128 MG/DL (ref 70–110)
POCT GLUCOSE: 64 MG/DL (ref 70–110)
POCT GLUCOSE: 83 MG/DL (ref 70–110)
POCT GLUCOSE: 96 MG/DL (ref 70–110)
POTASSIUM SERPL-SCNC: 3.8 MMOL/L (ref 3.5–5.1)
POTASSIUM SERPL-SCNC: 3.8 MMOL/L (ref 3.5–5.1)
PROT SERPL-MCNC: 5.7 G/DL (ref 6–8.4)
PROT SERPL-MCNC: 6 G/DL (ref 6–8.4)
RBC # BLD AUTO: 3.68 M/UL (ref 4–5.4)
RBC # BLD AUTO: 3.69 M/UL (ref 4–5.4)
SODIUM SERPL-SCNC: 139 MMOL/L (ref 136–145)
SODIUM SERPL-SCNC: 141 MMOL/L (ref 136–145)
WBC # BLD AUTO: 3.64 K/UL (ref 3.9–12.7)
WBC # BLD AUTO: 4.68 K/UL (ref 3.9–12.7)

## 2024-12-09 PROCEDURE — 85025 COMPLETE CBC W/AUTO DIFF WBC: CPT | Mod: 91 | Performed by: INTERNAL MEDICINE

## 2024-12-09 PROCEDURE — 63600175 PHARM REV CODE 636 W HCPCS: Performed by: STUDENT IN AN ORGANIZED HEALTH CARE EDUCATION/TRAINING PROGRAM

## 2024-12-09 PROCEDURE — 94761 N-INVAS EAR/PLS OXIMETRY MLT: CPT

## 2024-12-09 PROCEDURE — 94799 UNLISTED PULMONARY SVC/PX: CPT

## 2024-12-09 PROCEDURE — 25000003 PHARM REV CODE 250: Performed by: HOSPITALIST

## 2024-12-09 PROCEDURE — 36415 COLL VENOUS BLD VENIPUNCTURE: CPT | Performed by: INTERNAL MEDICINE

## 2024-12-09 PROCEDURE — 97165 OT EVAL LOW COMPLEX 30 MIN: CPT

## 2024-12-09 PROCEDURE — 99900035 HC TECH TIME PER 15 MIN (STAT)

## 2024-12-09 PROCEDURE — 25000003 PHARM REV CODE 250: Performed by: FAMILY MEDICINE

## 2024-12-09 PROCEDURE — 82533 TOTAL CORTISOL: CPT | Performed by: INTERNAL MEDICINE

## 2024-12-09 PROCEDURE — 80053 COMPREHEN METABOLIC PANEL: CPT | Mod: 91 | Performed by: INTERNAL MEDICINE

## 2024-12-09 PROCEDURE — 63600175 PHARM REV CODE 636 W HCPCS: Performed by: HOSPITALIST

## 2024-12-09 PROCEDURE — 82024 ASSAY OF ACTH: CPT | Performed by: INTERNAL MEDICINE

## 2024-12-09 PROCEDURE — 85025 COMPLETE CBC W/AUTO DIFF WBC: CPT | Performed by: HOSPITALIST

## 2024-12-09 PROCEDURE — 80053 COMPREHEN METABOLIC PANEL: CPT | Performed by: HOSPITALIST

## 2024-12-09 PROCEDURE — 83735 ASSAY OF MAGNESIUM: CPT | Performed by: HOSPITALIST

## 2024-12-09 PROCEDURE — 63600175 PHARM REV CODE 636 W HCPCS: Performed by: INTERNAL MEDICINE

## 2024-12-09 PROCEDURE — 25000003 PHARM REV CODE 250: Performed by: INTERNAL MEDICINE

## 2024-12-09 PROCEDURE — 97162 PT EVAL MOD COMPLEX 30 MIN: CPT

## 2024-12-09 PROCEDURE — 99222 1ST HOSP IP/OBS MODERATE 55: CPT | Mod: ,,, | Performed by: INTERNAL MEDICINE

## 2024-12-09 PROCEDURE — 11000001 HC ACUTE MED/SURG PRIVATE ROOM

## 2024-12-09 PROCEDURE — 36415 COLL VENOUS BLD VENIPUNCTURE: CPT | Performed by: HOSPITALIST

## 2024-12-09 PROCEDURE — 84100 ASSAY OF PHOSPHORUS: CPT | Performed by: HOSPITALIST

## 2024-12-09 PROCEDURE — 97116 GAIT TRAINING THERAPY: CPT

## 2024-12-09 PROCEDURE — 25000003 PHARM REV CODE 250: Performed by: STUDENT IN AN ORGANIZED HEALTH CARE EDUCATION/TRAINING PROGRAM

## 2024-12-09 PROCEDURE — 97530 THERAPEUTIC ACTIVITIES: CPT

## 2024-12-09 RX ORDER — MIDODRINE HYDROCHLORIDE 5 MG/1
10 TABLET ORAL 3 TIMES DAILY
Status: DISCONTINUED | OUTPATIENT
Start: 2024-12-09 | End: 2024-12-11 | Stop reason: HOSPADM

## 2024-12-09 RX ORDER — GLUCAGON 1 MG
1 KIT INJECTION
Status: DISCONTINUED | OUTPATIENT
Start: 2024-12-09 | End: 2024-12-11 | Stop reason: HOSPADM

## 2024-12-09 RX ORDER — HYDROMORPHONE HYDROCHLORIDE 1 MG/ML
0.5 INJECTION, SOLUTION INTRAMUSCULAR; INTRAVENOUS; SUBCUTANEOUS EVERY 4 HOURS PRN
Status: DISCONTINUED | OUTPATIENT
Start: 2024-12-09 | End: 2024-12-11 | Stop reason: HOSPADM

## 2024-12-09 RX ORDER — COSYNTROPIN 0.25 MG/ML
0.25 INJECTION, POWDER, FOR SOLUTION INTRAMUSCULAR; INTRAVENOUS ONCE
Status: DISCONTINUED | OUTPATIENT
Start: 2024-12-09 | End: 2024-12-09

## 2024-12-09 RX ORDER — COSYNTROPIN 0.25 MG/ML
0.25 INJECTION, POWDER, FOR SOLUTION INTRAMUSCULAR; INTRAVENOUS ONCE
Status: COMPLETED | OUTPATIENT
Start: 2024-12-09 | End: 2024-12-09

## 2024-12-09 RX ORDER — IBUPROFEN 200 MG
24 TABLET ORAL
Status: DISCONTINUED | OUTPATIENT
Start: 2024-12-09 | End: 2024-12-11 | Stop reason: HOSPADM

## 2024-12-09 RX ORDER — ALUMINUM HYDROXIDE, MAGNESIUM HYDROXIDE, AND SIMETHICONE 2400; 240; 2400 MG/30ML; MG/30ML; MG/30ML
30 SUSPENSION ORAL EVERY 6 HOURS PRN
Status: DISCONTINUED | OUTPATIENT
Start: 2024-12-09 | End: 2024-12-11 | Stop reason: HOSPADM

## 2024-12-09 RX ORDER — HYDROXYZINE PAMOATE 25 MG/1
50 CAPSULE ORAL ONCE
Status: COMPLETED | OUTPATIENT
Start: 2024-12-09 | End: 2024-12-09

## 2024-12-09 RX ORDER — IBUPROFEN 200 MG
24 TABLET ORAL
Status: DISCONTINUED | OUTPATIENT
Start: 2024-12-09 | End: 2024-12-09

## 2024-12-09 RX ORDER — GLUCAGON 1 MG
1 KIT INJECTION
Status: DISCONTINUED | OUTPATIENT
Start: 2024-12-09 | End: 2024-12-09

## 2024-12-09 RX ORDER — COSYNTROPIN 0.25 MG/ML
0.25 INJECTION, POWDER, FOR SOLUTION INTRAMUSCULAR; INTRAVENOUS ONCE
Status: DISCONTINUED | OUTPATIENT
Start: 2024-12-09 | End: 2024-12-11 | Stop reason: HOSPADM

## 2024-12-09 RX ADMIN — MIDODRINE HYDROCHLORIDE 10 MG: 5 TABLET ORAL at 03:12

## 2024-12-09 RX ADMIN — ACETAMINOPHEN 650 MG: 325 TABLET ORAL at 05:12

## 2024-12-09 RX ADMIN — ESTRADIOL 1 MG: 0.5 TABLET ORAL at 10:12

## 2024-12-09 RX ADMIN — FLUTICASONE PROPIONATE 100 MCG: 50 SPRAY, METERED NASAL at 10:12

## 2024-12-09 RX ADMIN — HYDROXYZINE HYDROCHLORIDE 50 MG: 25 TABLET, FILM COATED ORAL at 05:12

## 2024-12-09 RX ADMIN — MIDODRINE HYDROCHLORIDE 10 MG: 5 TABLET ORAL at 10:12

## 2024-12-09 RX ADMIN — Medication 6 MG: at 08:12

## 2024-12-09 RX ADMIN — LEVOTHYROXINE SODIUM 125 MCG: 25 TABLET ORAL at 05:12

## 2024-12-09 RX ADMIN — MIDODRINE HYDROCHLORIDE 10 MG: 5 TABLET ORAL at 08:12

## 2024-12-09 RX ADMIN — SENNOSIDES 8.6 MG: 8.6 TABLET, FILM COATED ORAL at 10:12

## 2024-12-09 RX ADMIN — HYDROXYZINE PAMOATE 50 MG: 25 CAPSULE ORAL at 11:12

## 2024-12-09 RX ADMIN — FLUOXETINE HYDROCHLORIDE 20 MG: 20 CAPSULE ORAL at 10:12

## 2024-12-09 RX ADMIN — HYDROMORPHONE HYDROCHLORIDE 0.5 MG: 1 INJECTION, SOLUTION INTRAMUSCULAR; INTRAVENOUS; SUBCUTANEOUS at 03:12

## 2024-12-09 RX ADMIN — Medication 200 ML: at 10:12

## 2024-12-09 RX ADMIN — TOPIRAMATE 200 MG: 100 TABLET, FILM COATED ORAL at 10:12

## 2024-12-09 RX ADMIN — DEXTROSE MONOHYDRATE: 100 INJECTION, SOLUTION INTRAVENOUS at 01:12

## 2024-12-09 RX ADMIN — PANTOPRAZOLE SODIUM 40 MG: 40 TABLET, DELAYED RELEASE ORAL at 10:12

## 2024-12-09 RX ADMIN — ACETAMINOPHEN 650 MG: 325 TABLET ORAL at 08:12

## 2024-12-09 RX ADMIN — ACETAMINOPHEN 650 MG: 325 TABLET ORAL at 10:12

## 2024-12-09 RX ADMIN — COSYNTROPIN 0.25 MG: 0.25 INJECTION, POWDER, LYOPHILIZED, FOR SOLUTION INTRAMUSCULAR; INTRAVENOUS at 03:12

## 2024-12-09 RX ADMIN — TAMSULOSIN HYDROCHLORIDE 0.4 MG: 0.4 CAPSULE ORAL at 10:12

## 2024-12-09 RX ADMIN — POLYETHYLENE GLYCOL 3350 17 G: 17 POWDER, FOR SOLUTION ORAL at 10:12

## 2024-12-09 RX ADMIN — AZELASTINE 137 MCG: 1 SPRAY, METERED NASAL at 08:12

## 2024-12-09 RX ADMIN — CETIRIZINE HYDROCHLORIDE 10 MG: 10 TABLET, FILM COATED ORAL at 10:12

## 2024-12-09 RX ADMIN — Medication 200 ML: at 08:12

## 2024-12-09 RX ADMIN — ENOXAPARIN SODIUM 40 MG: 40 INJECTION SUBCUTANEOUS at 05:12

## 2024-12-09 RX ADMIN — ALUMINUM HYDROXIDE, MAGNESIUM HYDROXIDE, AND DIMETHICONE 30 ML: 400; 400; 40 SUSPENSION ORAL at 03:12

## 2024-12-09 RX ADMIN — ONDANSETRON 4 MG: 2 INJECTION INTRAMUSCULAR; INTRAVENOUS at 08:12

## 2024-12-09 RX ADMIN — Medication 200 ML: at 12:12

## 2024-12-09 RX ADMIN — Medication 200 ML: at 05:12

## 2024-12-09 RX ADMIN — METHYLPHENIDATE HYDROCHLORIDE 30 MG: 5 TABLET ORAL at 10:12

## 2024-12-09 RX ADMIN — TOPIRAMATE 200 MG: 100 TABLET, FILM COATED ORAL at 08:12

## 2024-12-09 RX ADMIN — HYDROMORPHONE HYDROCHLORIDE 0.5 MG: 1 INJECTION, SOLUTION INTRAMUSCULAR; INTRAVENOUS; SUBCUTANEOUS at 08:12

## 2024-12-09 NOTE — ASSESSMENT & PLAN NOTE
Patient with episodes of mild hypoglycemia in the setting of malnutrition and recent Mounjaro use with A1c of 4.7. TSH wnl on home synthroid. Cosyntropin test revealed initial cortisol of 4.3 elevated to 13.7 after 30 mins but was not repeated after 60 mins. Hypoglycemia likely related to her malnutrition but will rule out adrenal insufficiency in patient with recent weight loss and concomitant hypotension.     Plan  - Ruling out adrenal insufficiency with repeat cosyntropin stimulation test  - Discontinued D10 infusion  - Mounjaro discontinued  - Check fingerstick glucose every 4 hours, if glucose is less than 55, please repeat POCT glucose in a different area of the patient body (ear lobe, upper arm, forearm)  - If serum glucose less than 55 on 2 simultaneous finger sticks or labs, check STAT c-peptide, insulin level, proinsulin level, renal function panel, beta hydroxybutyrate, and sulfonylurea screen  - Check STAT labs prior to treating with glucose if patient is stable (Though if patient is obtunded or hemodynamically unstable, then treat hypoglycemia immediately). Otherwise the administration of glucose may decrease the accuracy of the labs    - After labs are obtained, treat hypoglycemia with IV Dextrose bolus per protocol  - If severe hypoglycemia continues, give subcutaneous or intramuscular glucagon (1 mg) order 1x and continue to monitor glucose per hypoglycemia protocol  - If persistent hypoglycemia persists despite above treatment, notify primary team to start D5 1/2 Normal Saline vs D10 drip    - If no further documented hypoglycemia, will consider mixed meal testing or monitoring with a continuous glucose monitor (CGM)

## 2024-12-09 NOTE — PLAN OF CARE
Problem: Physical Therapy  Goal: Physical Therapy Goal  Description: Goals to be met by: 2024     Patient will increase functional independence with mobility by performin. Supine to sit with Set-up [place]  2. Sit to supine with Set-up [place]  3. Sit to stand transfer with Supervision  4. Bed to chair transfer with Supervision using Rolling Walker  5. Gait  x 200 feet with Supervision using Rolling Walker.   6. Lower extremity exercise program x15 reps per handout, with supervision  7. Pt. to perform w/c mobility with supervision for 150'    Outcome: Progressing

## 2024-12-09 NOTE — SUBJECTIVE & OBJECTIVE
"Interval HPI:   Patient being evaluated for adrenal insufficiency after an incomplete cortisol stimulation test was done. D10 infusion was discontinued. Repeat stim test ordered.     PMH, PSH, FH, SH updated and reviewed     ROS:    Review of Systems   Constitutional:  Positive for appetite change and fatigue. Negative for fever.   Eyes:  Negative for visual disturbance.   Respiratory:  Negative for shortness of breath.    Cardiovascular:  Negative for chest pain.   Gastrointestinal:  Positive for abdominal pain, constipation, diarrhea, nausea and vomiting. Negative for blood in stool.   Neurological:  Negative for dizziness, weakness and light-headedness.       Labs Reviewed and Include:  BASELINE Creatinine:   [unfilled]  [unfilled]  [unfilled]  Recent Labs   Lab 12/09/24  0355   GLU 87   CALCIUM 8.7   ALBUMIN 2.9*   PROT 5.7*      K 3.8   CO2 20*   *   BUN 5*   CREATININE 0.8   ALKPHOS 243*   *   *   BILITOT 0.2     Lab Results   Component Value Date    HGBA1C 4.7 12/07/2024       Nutritional status:   Body mass index is 26.34 kg/m².  Lab Results   Component Value Date    ALBUMIN 2.9 (L) 12/09/2024    ALBUMIN 2.5 (L) 12/08/2024    ALBUMIN 2.8 (L) 12/07/2024     No results found for: "PREALBUMIN"    Estimated Creatinine Clearance: 84.2 mL/min (based on SCr of 0.8 mg/dL).    POCT Glucose results:    Current Insulin Regimen:       PHYSICAL EXAMINATION:  Vitals:    12/09/24 1330   BP: 107/70   Pulse: 82   Resp: 17   Temp: 97.6 °F (36.4 °C)     Body mass index is 26.34 kg/m².     Physical Exam  Constitutional:       General: She is not in acute distress.  Eyes:      Extraocular Movements: Extraocular movements intact.   Cardiovascular:      Pulses: Normal pulses.      Heart sounds: Normal heart sounds.   Pulmonary:      Effort: Pulmonary effort is normal.      Breath sounds: Normal breath sounds.   Abdominal:      General: Abdomen is flat. There is no distension.      Palpations: Abdomen is " soft.      Tenderness: There is abdominal tenderness.      Comments: Well healed abdominal scars from previous surgeries.   Musculoskeletal:      Right lower leg: No edema.      Left lower leg: No edema.   Skin:     General: Skin is warm.   Neurological:      Mental Status: She is alert and oriented to person, place, and time.   Psychiatric:         Mood and Affect: Mood is anxious.

## 2024-12-09 NOTE — ANESTHESIA PREPROCEDURE EVALUATION
[facility]  Anesthesia Pre-Operative Evaluation         Patient Name: Tayla Lambert  YOB: 1975  MRN: [id number]    SUBJECTIVE:     Pre-operative evaluation for Procedure(s) (LRB):  EGD (ESOPHAGOGASTRODUODENOSCOPY) (N/A)     12/09/2024    Tayla Lambert is a 49 y.o. female w/ a significant PMHx of 49-year-old female with PMH of T2DM, HTN, asthma, hypothyroidism, anxiety, depression, bipolar disorder, ADHD, migraine headache, s/p Sharon-en-Y gastric bypass 1993 in [place] who presented to ED for evaluation of ongoing abdominal pain associated with nausea, vomiting and alternating diarrhea/constipation for last 2 years.    Patient now presents for the above procedure(s) due to continued N and V.    Patient notes she took Mounjaro day before admission to hospital.      LDA:        Peripheral IV - Single Lumen 12/07/24 0819 20 G Anterior;Right Upper Arm (Active)   Site Assessment Clean;Dry;Intact;No redness;No swelling 12/09/24 0730   Line Securement Device Secured with sutureless device 12/09/24 0730   Extremity Assessment Distal to IV No redness;No swelling;No warmth 12/09/24 0730   Line Status Blood return noted;Flushed;Saline locked 12/09/24 0730   Dressing Status Clean;Dry;Intact 12/09/24 0730   Dressing Intervention Integrity maintained 12/09/24 0730   Reason Not Rotated Not due 12/08/24 2101   Number of days: 2       Prev airway: None documented.    Drips: None documented.      Patient Active Problem List   Diagnosis    Hypothyroidism    Depression    Anxiety    Migraine without status migrainosus, not intractable    Attention deficit hyperactivity disorder (ADHD) with anxiety    Tremors of nervous system    Fatigue    Dizziness    Leg swelling    T12 burst fracture    Rheumatoid arthritis involving both hands with positive rheumatoid factor    History of gastric bypass    Binge eating disorder    Degenerative disc disease, lumbar    Essential hypertension    Headache     Insomnia disorder related to known organic factor    Insulin resistance    Iron deficiency    Kidney stone    Lack of libido    Menopausal flushing    Restless legs    Vitamin B12 deficiency (non anemic)    Chronic low back pain    Numbness and tingling    Vitamin D deficiency    Right upper quadrant abdominal pain but she also has pain in the left lower abdomen as well as her epigastrium associated with transaminitis    Nausea and vomiting    Elevated transaminase level    Hypoglycemia    Type 2 diabetes mellitus    Acute cystitis without hematuria    Urinary tract infection without hematuria    Dehydration    Other chest pain    Abnormal LFTs    Chronic abdominal pain    Hypotension    Bipolar disorder       Review of patient's allergies indicates:  No Known Allergies    Current Inpatient Medications:   acetaminophen  650 mg Oral TID    azelastine  1 spray Nasal BID    cetirizine  10 mg Oral Daily    cosyntropin  0.25 mg Intravenous Once    divalproex ER  250 mg Oral Daily    electrolytes-dextrose  200 mL Oral Q4H    enoxparin  40 mg Subcutaneous Q24H (prophylaxis, 1700)    estradioL  1 mg Oral Daily    FLUoxetine  20 mg Oral Daily    fluticasone propionate  2 spray Each Nostril Daily    levothyroxine  125 mcg Oral Before breakfast    lurasidone  60 mg Oral Daily    methylphenidate HCl  30 mg Oral Daily    midodrine  10 mg Oral TID    pantoprazole  40 mg Oral Daily    polyethylene glycol  17 g Oral BID    senna  8.6 mg Oral BID    tamsulosin  0.4 mg Oral Daily    topiramate  200 mg Oral BID       No current facility-administered medications on file prior to encounter.     Current Outpatient Medications on File Prior to Encounter   Medication Sig Dispense Refill    acetaminophen-codeine 300-30mg (TYLENOL #3) 300-30 mg Tab Take 1 tablet by mouth 2 (two) times daily. 40 tablet 0    albuterol (PROVENTIL/VENTOLIN HFA) 90 mcg/actuation inhaler Inhale 2 puffs into the lungs every 6 (six) hours as needed for Wheezing.  Rescue 18 g 5    cariprazine (VRAYLAR) 1.5 mg Cap Take 1 capsule (1.5 mg total) by mouth every evening. 30 capsule 1    cetirizine (ZYRTEC) 10 MG tablet Take 1 tablet (10 mg total) by mouth once daily. 30 tablet 2    cyanocobalamin 1,000 mcg/mL injection Inject 1 mL (1,000 mcg total) into the muscle every 30 days. Inject 1 mL into the muscle every month 3 mL 3    dicyclomine (BENTYL) 20 mg tablet Take 20 mg by mouth.      divalproex ER (DEPAKOTE ER) 250 MG 24 hr tablet Take 1 tablet (250 mg total) by mouth once daily. 30 tablet 0    ergocalciferol (VITAMIN D2) 50,000 unit Cap Take 50,000 Units by mouth every 7 days.      estradioL (ESTRACE) 1 MG tablet Take 1 tablet (1 mg total) by mouth once daily. 30 tablet 2    FLUoxetine 20 MG capsule Take 1 capsule (20 mg total) by mouth once daily. 30 capsule 1    fluticasone propionate (FLONASE) 50 mcg/actuation nasal spray 2 sprays (100 mcg total) by Each Nostril route once daily 16 g 11    gabapentin (NEURONTIN) 300 MG capsule Take 1 capsule (300 mg total) by mouth 3 (three) times daily. 90 capsule 1    hydrOXYzine (ATARAX) 50 MG tablet Take 1 tablet (50 mg total) by mouth 3 (three) times daily as needed for Anxiety. 90 tablet 2    levothyroxine (SYNTHROID) 125 MCG tablet Take 1 tablet (125 mcg total) by mouth before breakfast. 30 tablet 0    lurasidone (LATUDA) 60 mg Tab tablet Take 1 tablet (60 mg total) by mouth once daily. 30 tablet 1    olmesartan (BENICAR) 40 MG tablet Take 1 tablet (40 mg total) by mouth once daily. 90 tablet 3    pantoprazole (PROTONIX) 40 MG tablet Take 1 tablet (40 mg total) by mouth once daily. 30 tablet 2    temazepam (RESTORIL) 15 mg Cap Take 1 capsule (15 mg total) by mouth nightly as needed (insomnia). 30 capsule 0    tirzepatide (MOUNJARO) 15 mg/0.5 mL PnIj Inject 15 mg into the skin every 7 days. 2 mL 5    tiZANidine (ZANAFLEX) 4 MG tablet Take 1 tablet (4 mg total) by mouth every evening. 30 tablet 0    topiramate (TOPAMAX) 200 MG Tab Take  1 tablet (200 mg total) by mouth 2 (two) times daily. 180 tablet 1    VYVANSE 70 mg capsule Take 1 capsule (70 mg total) by mouth every morning. 30 capsule 0    azelastine 205.5 mcg (0.15 %) Spry 1 spray by Nasal route once daily. 30 mL 2    diclofenac (VOLTAREN) 75 MG EC tablet Take 75 mg by mouth once daily.      meclizine (ANTIVERT) 50 MG tablet Take 25 mg by mouth every 8 (eight) hours.      permethrin (ELIMITE) 5 % cream Apply 1 application  topically once.      sucralfate (CARAFATE) 1 gram tablet Take 1 tablet (1 g total) by mouth 4 (four) times daily as needed (indigestion/acid reflux). 120 tablet 3    sumatriptan (IMITREX) 100 MG tablet Take 1 tablet (100 mg total) by mouth every 2 (two) hours as needed for Migraine. Do need exceed 2 tablets in 24 hours.      tamsulosin (FLOMAX) 0.4 mg Cap Take 1 capsule (0.4 mg total) by mouth once daily. 30 capsule 11    traMADoL (ULTRAM) 50 mg tablet Take 50 mg by mouth every 8 (eight) hours as needed.      triamcinolone acetonide 0.1% (KENALOG) 0.1 % ointment Apply topically 2 (two) times daily. for 14 days 80 g 1    TRINTELLIX 20 mg Tab Take 1 tablet (20 mg total) by mouth every evening. 30 tablet 2    valACYclovir (VALTREX) 1000 MG tablet Take 1 tablet (1,000 mg total) by mouth 2 (two) times daily. For 5 days with outbreaks. 60 tablet 2    zinc oxide 10 % Oint Apply 1 Application topically 2 (two) times a day.         Past Surgical History:   Procedure Laterality Date    ABDOMINAL SURGERY      APPENDECTOMY      BACK SURGERY  2018    T11 to L1 PSF w/T12 laminectomy     SECTION      CHOLECYSTECTOMY      GASTRIC BYPASS      HYSTERECTOMY      TONSILLECTOMY         Social History     Socioeconomic History    Marital status: Single   Tobacco Use    Smoking status: Never    Smokeless tobacco: Never   Substance and Sexual Activity    Alcohol use: Not Currently    Drug use: Never    Sexual activity: Not Currently     Social Drivers of Health     Financial  Resource Strain: Low Risk  (12/7/2024)    Overall Financial Resource Strain (CARDIA)     Difficulty of Paying Living Expenses: Not very hard   Food Insecurity: No Food Insecurity (12/7/2024)    Hunger Vital Sign     Worried About Running Out of Food in the Last Year: Never true     Ran Out of Food in the Last Year: Never true   Transportation Needs: No Transportation Needs (12/7/2024)    TRANSPORTATION NEEDS     Transportation : No   Physical Activity: Sufficiently Active (12/7/2024)    Exercise Vital Sign     Days of Exercise per Week: 7 days     Minutes of Exercise per Session: 30 min   Stress: Stress Concern Present (12/7/2024)    [employer] [place] [employer] - Occupational Stress Questionnaire     Feeling of Stress : To some extent   Housing Stability: Low Risk  (12/7/2024)    Housing Stability Vital Sign     Unable to Pay for Housing in the Last Year: No     Homeless in the Last Year: No   Recent Concern: Housing Stability - High Risk (11/25/2024)    Housing Stability Vital Sign     Unable to Pay for Housing in the Last Year: Yes     Homeless in the Last Year: No       OBJECTIVE:     Vital Signs Range (Last 24H):  Temp:  [36.4 °C (97.5 °F)-36.7 °C (98.1 °F)]   Pulse:  [64-80]   Resp:  [16-18]   BP: ()/(47-82)   SpO2:  [93 %-98 %]       Significant Labs:  Lab Results   Component Value Date    WBC 3.64 (L) 12/09/2024    HGB 11.7 (L) 12/09/2024    HCT 37.1 12/09/2024     12/09/2024    CHOL 204 (H) 11/04/2024    TRIG 97 11/04/2024    HDL 70 11/04/2024    LDLDIRECT 124.0 11/04/2024     (H) 12/09/2024     (H) 12/09/2024     12/09/2024    K 3.8 12/09/2024     (H) 12/09/2024    CREATININE 0.8 12/09/2024    BUN 5 (L) 12/09/2024    CO2 20 (L) 12/09/2024    TSH 0.977 12/07/2024    INR 1.0 11/23/2024    HGBA1C 4.7 12/07/2024       Diagnostic Studies: No relevant studies.    EKG:   Results for orders placed or performed during the hospital encounter of 12/06/24   EKG  12-lead    Collection Time: 12/06/24  6:22 PM   Result Value Ref Range    QRS Duration 100 ms    [facility] QTC Calculation 489 ms    Narrative    Test Reason : R55,    Vent. Rate :  71 BPM     Atrial Rate :  71 BPM     P-R Int : 176 ms          QRS Dur : 100 ms      QT Int : 450 ms       P-R-T Axes :  68  46  23 degrees    QTcB Int : 489 ms    Normal sinus rhythm  Low voltage QRS  Borderline Abnormal ECG  When compared with ECG of 28-Nov-2024 10:21,  Nonspecific T wave abnormality now evident in Inferior leads  Nonspecific T wave abnormality now evident in Lateral leads  QT has lengthened  Confirmed by Chester Orantes ([id number]) on 12/7/2024 10:31:01 AM    Referred By: [email or web address] SELF           Confirmed By: Chester Orantes       2D ECHO:  TTE:  No results found for this or any previous visit.    ERIC:  No results found for this or any previous visit.    ASSESSMENT/PLAN:          Pre-op Assessment    I have reviewed the Patient Summary Reports.     I have reviewed the Nursing Notes. I have reviewed the NPO Status.   I have reviewed the Medications.     Review of Systems  Anesthesia Hx:  No problems with previous Anesthesia                Social:  Non-Smoker, No Alcohol Use       Hematology/Oncology:  Hematology Normal   Oncology Normal                                   EENT/Dental:  EENT/Dental Normal           Cardiovascular:     Hypertension                                          Pulmonary:    Asthma                    Renal/:   renal calculi               Hepatic/GI:   PUD,                  Musculoskeletal:  Arthritis (RA)               Neurological:      Headaches                                 Endocrine:  Diabetes Hypothyroidism          Psych:  Psychiatric History (ADHD, bipolar)  depression                Physical Exam  General: Well nourished, Cooperative, Alert and Oriented    Airway:  Mallampati: II / III/ II  Mouth Opening: Normal  TM Distance: Normal  Tongue: Normal  Neck ROM: Normal  ROM    Dental:  Intact        Anesthesia Plan  Type of Anesthesia, risks & benefits discussed:    Anesthesia Type: Gen Natural Airway  Intra-op Monitoring Plan: Standard ASA Monitors  Post Op Pain Control Plan: multimodal analgesia and IV/PO Opioids PRN  Informed Consent: Informed consent signed with the Patient and all parties understand the risks and agree with anesthesia plan.  All questions answered.   ASA Score: 3  Day of Surgery Review of History & Physical: H&P Update referred to the surgeon/provider.    Ready For Surgery From Anesthesia Perspective.     .

## 2024-12-09 NOTE — PROGRESS NOTES
Hospital Medicine  Progress note    Team: [facility] HOSP MED A Maryanne Camargo MD  Admit Date: 12/6/2024  Code status: Full Code    Principal Problem:  Hypoglycemia    Interval hx: Still with abdominal pain. She terminated MRCP early yesterday. She fell last night after getting dizzy and weak. There as noted confusion as she was not oriented to time. Patient denies confusion. Also this morning she stated she ate some grits, had abdominal pain and vomited it up. However, nursing reports she ate much of her tray and saw no vomiting.    PEx  Temp:  [97.5 °F (36.4 °C)-98.1 °F (36.7 °C)]   Pulse:  [64-80]   Resp:  [17-18]   BP: ()/(47-82)   SpO2:  [93 %-98 %]     Intake/Output Summary (Last 24 hours) at 12/9/2024 1240  Last data filed at 12/9/2024 1148  Gross per 24 hour   Intake 640 ml   Output 3100 ml   Net -2460 ml     General Appearance: no acute distress, WD, ill-appearing  Heart: regular rate and rhythm, no heave  Respiratory: Normal respiratory effort, symmetric excursion, bilateral vesicular breath sounds   Abdomen: Soft, bowel sounds active; patient guarded with her hand before I could stethoscope on  Skin: intact, no rash, no ulcers  Neurologic:  No focal numbness or weakness  Mental status: Alert, oriented x 4, affect appropriate    Recent Labs   Lab 12/07/24  0337 12/08/24  0332 12/09/24  0355   WBC 3.14* 2.18* 3.64*   HGB 11.2* 10.9* 11.7*   HCT 36.2* 33.4* 37.1    200 255     Recent Labs   Lab 12/06/24  1635 12/07/24  0337 12/08/24  0332 12/09/24  0355    139 139 139   K 3.8 4.9 3.9 3.8   * 116* 113* 112*   CO2 19* 16* 22* 20*   BUN 13 11 6 5*   CREATININE 0.8 0.8 0.8 0.8   GLU 77 74 71 87   CALCIUM 9.0 7.9* 8.2* 8.7   MG 2.3 2.0 2.0 2.1   PHOS  --  4.3 3.7 4.2   LIPASE 11 13  --   --      Recent Labs   Lab 12/07/24  0337 12/08/24  0332 12/09/24  0355   ALKPHOS 120 195* 243*   ALT 72* 152* 167*   AST 72* 138* 114*   ALBUMIN 2.8* 2.5* 2.9*   PROT 5.9* 5.0* 5.7*   BILITOT 0.2 0.2 0.2         Recent Labs   Lab 12/08/24 2009 12/08/24  2216 12/09/24  0001 12/09/24  0340 12/09/24  0744 12/09/24  1128   POCTGLUCOSE 97 126* 128* 104 96 64*       Scheduled Meds:   acetaminophen  650 mg Oral TID    azelastine  1 spray Nasal BID    cetirizine  10 mg Oral Daily    cosyntropin  0.25 mg Intravenous Once    divalproex ER  250 mg Oral Daily    electrolytes-dextrose  200 mL Oral Q4H    enoxparin  40 mg Subcutaneous Q24H (prophylaxis, 1700)    estradioL  1 mg Oral Daily    FLUoxetine  20 mg Oral Daily    fluticasone propionate  2 spray Each Nostril Daily    levothyroxine  125 mcg Oral Before breakfast    lurasidone  60 mg Oral Daily    methylphenidate HCl  30 mg Oral Daily    midodrine  10 mg Oral TID    pantoprazole  40 mg Oral Daily    polyethylene glycol  17 g Oral BID    senna  8.6 mg Oral BID    tamsulosin  0.4 mg Oral Daily    topiramate  200 mg Oral BID     Continuous Infusions:      As Needed:    Current Facility-Administered Medications:     acetaminophen, 650 mg, Oral, Q6H PRN    albuterol, 2 puff, Inhalation, Q6H PRN    aluminum & magnesium hydroxide-simethicone, 30 mL, Oral, Q6H PRN    dextrose 10%, 12.5 g, Intravenous, PRN    dextrose 10%, 25 g, Intravenous, PRN    glucagon (human recombinant), 1 mg, Intramuscular, PRN    glucose, 16 g, Oral, PRN    glucose, 24 g, Oral, PRN    glucose, 8 g, Oral, PRN    HYDROmorphone, 0.5 mg, Intravenous, Q4H PRN    hydrOXYzine, 50 mg, Oral, TID PRN    insulin aspart U-100, 0-5 Units, Subcutaneous, QID (AC + HS) PRN    melatonin, 6 mg, Oral, Nightly PRN    naloxone, 0.02 mg, Intravenous, PRN    ondansetron, 4 mg, Intravenous, Q8H PRN    sodium chloride 0.9%, 10 mL, Intravenous, Q12H PRN    sumatriptan, 100 mg, Oral, Q12H PRN    Assessment and Plan  / Problems managed today    * Hypoglycemia  -likely due to combinations of malnutrition and Mounjaro   -Mounjaro should be discontinued  - will do cortisol stim test  - endocrinology consulted   will check serum insulin  level, C-peptide, IGF, sulfonylurea screen for glc <50    Hypotension  -borderline low SBP  in a patient with h/o HTN treated with olmesartan   -recent BP log at home showed SBP in low 100s  -she will likely no longer need BP medication given significant weight loss   -no signs of bleeding, overt infection or sepsis   -SBP remains 85-90 despite 2 L NS bolus   -will give another 500 cc NS bolus and check lactic acid (WNL), procalcitonin (WNL), cortisol (acceptable for AM levels), TSH/FT4 (WNL)  - not improving despite boluses  - likely due to malnutrition as stated above  - cortisol stim test to rule out adrenal insufficiency - endocrinology consulted  - due to orthostasis and two falls/near falls, will start midodrine 10 mg TID while here and undergoing work up  - PT/OT    Type 2 diabetes mellitus  Patient's FSGs are controlled on current medication regimen.  Last A1c reviewed-   Lab Results   Component Value Date    HGBA1C 4.7 12/07/2024     Most recent fingerstick glucose reviewed-   Recent Labs   Lab 12/08/24  0230 12/08/24  0432 12/08/24  0727 12/08/24  1122   POCTGLUCOSE 120* 92 88 84       Current correctional scale  Low  Maintain anti-hyperglycemic dose as follows-   Antihyperglycemics (From admission, onward)      Start     Stop Route Frequency Ordered    12/07/24 0129  insulin aspart U-100 pen 0-5 Units         -- SubQ Before meals & nightly PRN 12/07/24 0030          Hold Oral hypoglycemics while patient is in the hospital.  Stop mounjaro jeff to persisting hypoglycemia    Bipolar disorder  -no acute issue, lives at home in [facility] with a friend   -denies SI/HI  -continue home Vraylar and depakote       Chronic abdominal pain  -long standing on and off abdominal pain, N/V/D/constipation for last 2 years with 140 lbs weight loss   -likely from adverse reaction of Mounjaro vs gastritis of gastric remnant and/or funcitonal  -CT abdomen showed mild inflammatory changes of gastric remnant    -LFTs unremarkable   -mild TTP over RUQ and across lower abdomen on my exam w/o peritonitis   -will start on PPI   -consult GI for further evaluation and possible EGD in setting of N/V with significant weight loss   - MRCP as recommended by Dr. Jack  - on hydromorphone but patient not eating - worsens GI motility and patient high risk for developing dependence  - stop maalox and dicylcomine as adding to polypharmacy and not working    Dehydration  -due to poor oral intake in setting of N/V  -received IVF   - resolved    Vitamin B12 deficiency (non anemic)  -on monthly vitamin B-12 injection      Attention deficit hyperactivity disorder (ADHD) with anxiety  -patient can take her home Vyvanse     Migraine without status migrainosus, not intractable  -no signs of acute flare up  -continue home topamax and imitrex prn       Anxiety  -no acute issue and will continue home Fluoxetine, hydroxyzine prn       Depression  Patient has recurrent depression which is moderate and is currently controlled. Will Continue anti-depressant medications. We will not consult psychiatry at this time. Patient does not display psychosis at this time. Continue to monitor closely and adjust plan of care as needed.  -continue home fluoxetine, Latuda and Trintellix         Hypothyroidism  -continue home synthroid   -check TSH/FT4        Diet:  liquid diet  GI PPx: not needed  DVT PPx:  enoxaparin  Airways: room air  Wounds: none    Goals of Care:  Return to prior functional status     Discharge Planning   JASPER: 12/11/2024   Is the patient medically ready for discharge?:     Reason for patient still in hospital (select all that apply): Patient trending condition and Treatment  Discharge Plan A: Home Health, Home   Discharge Delays: None known at this time    Maryanne Camargo MD

## 2024-12-09 NOTE — CARE UPDATE
I have reviewed the chart of Taylakimberly Lambert who is hospitalized for the following:    Active Hospital Problems    Diagnosis    *Hypoglycemia    Chronic abdominal pain    Hypotension    Bipolar disorder    Dehydration    Type 2 diabetes mellitus    Vitamin B12 deficiency (non anemic)    Hypothyroidism    Depression    Anxiety    Migraine without status migrainosus, not intractable    Attention deficit hyperactivity disorder (ADHD) with anxiety        Marissa Mccurdy PA-C  [facility]

## 2024-12-09 NOTE — CARE UPDATE
"RAPID RESPONSE NURSE CHART REVIEW        Chart Reviewed: 12/09/2024, 10:50 AM    MRN: [id number]  Bed: 1143/1143 A    Dx: Hypoglycemia    Tayla Lambert has a past medical history of ADHD (attention deficit hyperactivity disorder), Anxiety, Asthma, Depression, Diabetes mellitus, Encounter for blood transfusion, Essential hypertension, Hypothyroidism, Kidney stone, Migraine without status migrainosus, not intractable, Rheumatoid arthritis involving both hands with positive rheumatoid factor, T12 burst fracture, and Tremors of nervous system.    Last VS: /75 (BP Location: Right arm) Comment (Patient Position): trendelenburg  Pulse 73   Temp 97.5 °F (36.4 °C) (Oral)   Resp 18   Ht 5' 5.5" (1.664 m)   Wt 72.9 kg (160 lb 11.5 oz)   SpO2 95%   Breastfeeding No   BMI 26.34 kg/m²     24H Vital Sign Range:  Temp:  [97.5 °F (36.4 °C)-98.1 °F (36.7 °C)]   Pulse:  [63-73]   Resp:  [17-18]   BP: ()/(47-76)   SpO2:  [93 %-97 %]     Level of Consciousness (AVPU): alert    Recent Labs     12/07/24  0337 12/08/24  0332 12/09/24  0355   WBC 3.14* 2.18* 3.64*   HGB 11.2* 10.9* 11.7*   HCT 36.2* 33.4* 37.1    200 255       Recent Labs     12/07/24  0337 12/08/24  0332 12/09/24  0355    139 139   K 4.9 3.9 3.8   * 113* 112*   CO2 16* 22* 20*   BUN 11 6 5*   CREATININE 0.8 0.8 0.8   GLU 74 71 87   PHOS 4.3 3.7 4.2   MG 2.0 2.0 2.1        OXYGEN:  RA    MEWS score: 2    Rounding completed at 0907.    Rounding completed with charge BETTINA alcantara patient had a fall with head trauma this morning. Patient awake and alert per assessment. Head CT completed to acute findings. No acute concerns verbalized at this time. Instructed to call [phone] for further concerns or assistance.    Sidra Dias RN      "

## 2024-12-09 NOTE — PT/OT/SLP EVAL
Occupational Therapy   Evaluation    Name: Tayla Lambert  MRN: [id number]  Admitting Diagnosis: Hypoglycemia  Recent Surgery: Procedure(s) (LRB):  EGD (ESOPHAGOGASTRODUODENOSCOPY) (N/A) * Surgery Date in Future *    Recommendations:     Discharge Recommendations: Low Intensity Therapy  Discharge Equipment Recommendations:  walker, rolling, shower chair  Barriers to discharge:  Decreased caregiver support    Patient demonstrates a mobility limitation that significantly impairs their ability to participate in one or more mobility related activities of daily living. Patient's mobility limitation cannot be sufficiently resolved with the use of a cane, but can be sufficiently resolved with the use of a rolling walker.The use of a rolling walker will considerably improve their ability to participate in MRADLs. Patient will use the walker on a regular basis at home.      Assessment:     Tayla Lambert is a 49 y.o. female with a medical diagnosis of Hypoglycemia.  She presents with c/o weakness and daily falls at home. Pt had a fall in the bathroom on this admit. Pt also had a LOB while walking with OT requiring assist to recover - pt claimed it was due to dizziness. Performance deficits affecting function: weakness, impaired endurance, decreased coordination, impaired self care skills, impaired functional mobility, gait instability, impaired balance, decreased safety awareness.      Rehab Prognosis: Good; patient would benefit from acute skilled OT services to address these deficits and reach maximum level of function.       Plan:     Patient to be seen 3 x/week to address the above listed problems via self-care/home management, therapeutic exercises, therapeutic activities  Plan of Care Expires: 01/08/25  Plan of Care Reviewed with: patient    Subjective     Occupational Profile:  Living Environment: Pt lives alone in a trailer with a ramp entrance and both bathroom setups.  Previous level of function: Independent with  ADLs / was using a borrowed rollator. Falls daily.  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  Equipment Used at Home: none  Assistance upon Discharge: Friend    Pain/Comfort:  Pain Rating 1: 0/10    Patients cultural, spiritual, Jehovah's witness conflicts given the current situation:      Objective:     Communicated with: rn prior to session.  Patient found supine with   upon OT entry to room.    General Precautions: Standard, fall  Orthopedic Precautions:    Braces:    Respiratory Status: Room air    Occupational Performance:    Bed Mobility:    Patient completed Scooting/Bridging with supervision  Patient completed Supine to Sit with supervision  Patient completed Sit to Supine with supervision    Functional Mobility/Transfers:  Patient completed Sit <> Stand Transfer with stand by assistance  with  rolling walker   Patient completed Bed <> Chair Transfer using Step Transfer technique with contact guard assistance with rolling walker  Patient completed Toilet Transfer Step Transfer technique with stand by assistance with  rolling walker  Functional Mobility: Pt walked 60' CGA with a RW then had a LOB requiring assist to recover. Returned to bed for safety.    Activities of Daily Living:  Feeding:  independence  Toileting: stand by assistance     Cognitive/Visual Perceptual:  Cognitive/Psychosocial Skills:     -       Oriented to: Person, Place, Time, and Situation   -       Safety awareness/insight to disability: intact     Physical Exam:  Upper Extremity Range of Motion:     -       Right Upper Extremity: WNL  -       Left Upper Extremity: WNL  Upper Extremity Strength:    -       Right Upper Extremity: WFL  -       Left Upper Extremity: WFL    AMPAC 6 Click ADL:  AMPAC Total Score: 21    Treatment & Education:  UE ROM/MMT  Bed mobility training / assessment  Functional mobility assessment  Sit/standing balance assessment  Discussed OT POC / Post-acute plan    Patient left supine with all lines intact and call  button in reach    GOALS:   Multidisciplinary Problems       Occupational Therapy Goals          Problem: Occupational Therapy    Goal Priority Disciplines Outcome Interventions   Occupational Therapy Goal     OT, PT/OT Progressing    Description: Goals to be met by: 24    Patient will increase functional independence with ADLs by performing:    LE Dressing with Set-up Assistance.  Grooming while standing at sink with Set-up Assistance.  Toileting from toilet with Supervision for hygiene and clothing management.   Supine to sit with Modified Coffee.  Toilet transfer to toilet with Modified Coffee.                         History:     Past Medical History:   Diagnosis Date    ADHD (attention deficit hyperactivity disorder)     Anxiety     Asthma     Depression     Diabetes mellitus     Encounter for blood transfusion     Essential hypertension 2022    Hypothyroidism 2020    Kidney stone 2022    Migraine without status migrainosus, not intractable 2020    Rheumatoid arthritis involving both hands with positive rheumatoid factor 2018    T12 burst fracture     Tremors of nervous system 2020         Past Surgical History:   Procedure Laterality Date    ABDOMINAL SURGERY      APPENDECTOMY      BACK SURGERY  2018    T11 to L1 PSF w/T12 laminectomy     SECTION      CHOLECYSTECTOMY      GASTRIC BYPASS      HYSTERECTOMY      TONSILLECTOMY         Time Tracking:     OT Date of Treatment: 24  OT Start Time: 1248  OT Stop Time: 1305  OT Total Time (min): 17 min    Billable Minutes:Evaluation 5  Therapeutic Activity 12    2024

## 2024-12-09 NOTE — PLAN OF CARE
Problem: Occupational Therapy  Goal: Occupational Therapy Goal  Description: Goals to be met by: 12/16/24    Patient will increase functional independence with ADLs by performing:    LE Dressing with Set-up Assistance.  Grooming while standing at sink with Set-up Assistance.  Toileting from toilet with Supervision for hygiene and clothing management.   Supine to sit with Modified Harvey.  Toilet transfer to toilet with Modified Harvey.    Outcome: Progressing

## 2024-12-09 NOTE — PT/OT/SLP EVAL
Physical Therapy Co-Evaluation    Patient Name:  Tayla Lambert   MRN:  [id number]    Co-evaluation and co-treatment performed for this visit due to suspected patient need for two skilled therapists to ensure patient and staff safety and to accommodate for patient activity tolerance/pain management     Recommendations:     Discharge Recommendations: Low Intensity Therapy   Discharge Equipment Recommendations: wheelchair, rollator   Barriers to discharge: Decreased caregiver support    Assessment:     Tayla Lambert is a 49 y.o. female admitted with a medical diagnosis of Hypoglycemia.  She presents with the following impairments/functional limitations: weakness, impaired endurance, impaired self care skills, impaired functional mobility, gait instability, impaired balance, decreased safety awareness Pt. cooperative and tolerated treatment fairly well. Pt. progressing with mobility with RW, but gets weak unexpectedly requiring to sit.    Rehab Prognosis: Good; patient would benefit from acute skilled PT services to address these deficits and reach maximum level of function.    Recent Surgery: Procedure(s) (LRB):  EGD (ESOPHAGOGASTRODUODENOSCOPY) (N/A) * Surgery Date in Future *    Plan:     During this hospitalization, patient to be seen 3 x/week to address the identified rehab impairments via gait training, therapeutic exercises, therapeutic activities and progress toward the following goals:    Plan of Care Expires:  01/08/25    Subjective     Chief Complaint: weakness and frequent falls  Patient/Family Comments/goals: pt. Agreeable to PT  Pain/Comfort:  Pain Rating 1: 0/10  Pain Rating Post-Intervention 1: 0/10    Patients cultural, spiritual, Adventism conflicts given the current situation: no    Living Environment:  Pt. Lives alone in mobile home with ramp access  Prior to admission, patients level of function was amb. with rollator.  Equipment used at home:  (borrowed rollator).  Upon discharge, patient may  need to have assistance.    Objective:     Communicated with nursing prior to session.  Patient found supine with peripheral IV  upon PT entry to room.    General Precautions: Standard, fall  Orthopedic Precautions:N/A   Braces: N/A  Respiratory Status: Room air    Exams:  RLE ROM: WFL  RLE Strength: WFL  LLE ROM: WFL  LLE Strength: WFL    Functional Mobility:  Bed Mobility:     Rolling Right: stand by assistance  Scooting: stand by assistance  Supine to Sit: stand by assistance  Sit to Supine: stand by assistance  Transfers:     Sit to Stand:  stand by assistance with rolling walker  Gait: 10' and 60' with RW and CGA with decreased step length/augusto. After amb. 60', pt. Stated she was weak and needed to sit immediately in rolling office chair in hallway. Pt. returned to room safely in rolling office chair. Nursing notified.  Balance: fair->poor standing      AM-PAC 6 CLICK MOBILITY  Total Score:20       Treatment & Education:  Discussed therapy needs, goals, safety with mobility, and POC. Assisted pt. To/from bathroom.    Patient left supine with all lines intact and call button in reach.    GOALS:   Multidisciplinary Problems       Physical Therapy Goals          Problem: Physical Therapy    Goal Priority Disciplines Outcome Interventions   Physical Therapy Goal     PT, PT/OT Progressing    Description: Goals to be met by: 2024     Patient will increase functional independence with mobility by performin. Supine to sit with Set-up Metcalfe  2. Sit to supine with Set-up [place]calfe  3. Sit to stand transfer with Supervision  4. Bed to chair transfer with Supervision using Rolling Walker  5. Gait  x 200 feet with Supervision using Rolling Walker.   6. Lower extremity exercise program x15 reps per handout, with supervision  7. Pt. to perform w/c mobility with supervision for 150'                         History:     Past Medical History:   Diagnosis Date    ADHD (attention deficit hyperactivity  disorder)     Anxiety     Asthma     Depression     Diabetes mellitus     Encounter for blood transfusion     Essential hypertension 2022    Hypothyroidism 2020    Kidney stone 2022    Migraine without status migrainosus, not intractable 2020    Rheumatoid arthritis involving both hands with positive rheumatoid factor 2018    T12 burst fracture     Tremors of nervous system 2020       Past Surgical History:   Procedure Laterality Date    ABDOMINAL SURGERY      APPENDECTOMY      BACK SURGERY  2018    T11 to L1 PSF w/T12 laminectomy     SECTION      CHOLECYSTECTOMY      GASTRIC BYPASS      HYSTERECTOMY      TONSILLECTOMY         Time Tracking:     PT Received On: 24  PT Start Time: 1248     PT Stop Time: 1306  PT Total Time (min): 18 min     Billable Minutes: Evaluation 10 and Gait Training 8      2024

## 2024-12-09 NOTE — PLAN OF CARE
Problem: Adult Inpatient Plan of Care  Goal: Plan of Care Review  Outcome: Not Progressing  Goal: Optimal Comfort and Wellbeing  Outcome: Not Progressing     Problem: Diabetes Comorbidity  Goal: Blood Glucose Level Within Targeted Range  Outcome: Not Progressing     Problem: Fatigue  Goal: Improved Activity Tolerance  Outcome: Not Progressing     Problem: Syncope  Goal: Absence of Syncopal Symptoms  Outcome: Not Progressing

## 2024-12-09 NOTE — NURSING
Pt found on the floor by CNA. Pt stated she walk to the bathroom and became dizzy when getting off the toilet. She fell and hit her head on the side of the bath tub. No bruises or bleeding noted. MD notified. CT SCAN and orthostatic ordered.

## 2024-12-09 NOTE — CONSULTS
[facility] Tele[facility]  Endocrinology  Consult Note    Consult Requested by: Maryanne Camargo MD   Reason for admit: Hypoglycemia  Inpatient consult to Endocrinology  Consult performed by: Alfonso Martini DO  Consult ordered by: Maryanne Camargo MD  Reason for consult: AI rule out           HISTORY OF PRESENT ILLNESS:  Patient is a 48 yo female with PMH of T2DM, HTN, asthma, hypothyroidism, anxiety, depression, bipolar disorder,  ADHD, migraine headache, s/p Sharon-en-Y gastric bypass 1993 with several revisions who presented to ED for evaluation of ongoing abdominal pain associated with nausea, vomiting and alternating diarrhea/constipation for last 2 years. CT abdomen with IV and oral contrast showed status post Sharon-en-Y gastric bypass. Mild wall thickening and mucosal hyperenhancement suggesting inflammation of the gastric remnant/bypassed stomach. Hypoglycemic on presentation down to 46 improved with dextrose 50 gm >> 25 gm >> D10 infusion and ultimately admitted with hypoglycemia and soft blood pressures down to 85-90 systolic.     Patient states that her symptoms have been ongoing for about two years. In this time, she has lost about 140 pounds. Her main complaint is generalized abdominal pain that is worse with eating. She will often not be able to keep food down and has loose stools frequently. Has been on Mounjaro for the past few months for her diabetes. Patient's glucose improved on D10 infusion. Patient's TSH wnl on home synthroid 125 mcg. Cortisol stimulation test performed with initial cortisol 4.3 around 1pm and 13.7 after 30 minutes but no collection after 60 minutes. Endocrinology consulted for evaluation of possible adrenal insufficiency.     Medications and/or Treatments Impacting Glycemic Control:  Immunotherapy:    Immunosuppressants       None          Steroids:   Hormones (From admission, onward)      Start     Stop Route Frequency Ordered    12/07/24 0900  estradioL tablet 1 mg          -- Oral Daily 12/07/24 0030    12/07/24 0129  melatonin tablet 6 mg         -- Oral Nightly PRN 12/07/24 0030          Pressors:    Autonomic Drugs (From admission, onward)      Start     Stop Route Frequency Ordered    12/09/24 0945  midodrine tablet 10 mg         -- Oral 3 times daily 12/09/24 0842            Medications Prior to Admission   Medication Sig Dispense Refill Last Dose/Taking    acetaminophen-codeine 300-30mg (TYLENOL #3) 300-30 mg Tab Take 1 tablet by mouth 2 (two) times daily. 40 tablet 0 Past Month    albuterol (PROVENTIL/VENTOLIN HFA) 90 mcg/actuation inhaler Inhale 2 puffs into the lungs every 6 (six) hours as needed for Wheezing. Rescue 18 g 5 12/6/2024 Morning    cariprazine (VRAYLAR) 1.5 mg Cap Take 1 capsule (1.5 mg total) by mouth every evening. 30 capsule 1 12/5/2024 Evening    cetirizine (ZYRTEC) 10 MG tablet Take 1 tablet (10 mg total) by mouth once daily. 30 tablet 2 12/6/2024    cyanocobalamin 1,000 mcg/mL injection Inject 1 mL (1,000 mcg total) into the muscle every 30 days. Inject 1 mL into the muscle every month 3 mL 3 Past Month    dicyclomine (BENTYL) 20 mg tablet Take 20 mg by mouth.   12/5/2024 Evening    divalproex ER (DEPAKOTE ER) 250 MG 24 hr tablet Take 1 tablet (250 mg total) by mouth once daily. 30 tablet 0 12/6/2024 Morning    ergocalciferol (VITAMIN D2) 50,000 unit Cap Take 50,000 Units by mouth every 7 days.   12/5/2024 Morning    estradioL (ESTRACE) 1 MG tablet Take 1 tablet (1 mg total) by mouth once daily. 30 tablet 2 12/6/2024 Morning    FLUoxetine 20 MG capsule Take 1 capsule (20 mg total) by mouth once daily. 30 capsule 1 12/6/2024 Morning    fluticasone propionate (FLONASE) 50 mcg/actuation nasal spray 2 sprays (100 mcg total) by Each Nostril route once daily 16 g 11 12/6/2024 Morning    gabapentin (NEURONTIN) 300 MG capsule Take 1 capsule (300 mg total) by mouth 3 (three) times daily. 90 capsule 1 12/6/2024 Noon    hydrOXYzine (ATARAX) 50 MG tablet Take 1  tablet (50 mg total) by mouth 3 (three) times daily as needed for Anxiety. 90 tablet 2 12/6/2024 Morning    levothyroxine (SYNTHROID) 125 MCG tablet Take 1 tablet (125 mcg total) by mouth before breakfast. 30 tablet 0 12/6/2024 Morning    lurasidone (LATUDA) 60 mg Tab tablet Take 1 tablet (60 mg total) by mouth once daily. 30 tablet 1 12/5/2024 Evening    olmesartan (BENICAR) 40 MG tablet Take 1 tablet (40 mg total) by mouth once daily. 90 tablet 3 12/6/2024 Morning    pantoprazole (PROTONIX) 40 MG tablet Take 1 tablet (40 mg total) by mouth once daily. 30 tablet 2 12/6/2024 Morning    temazepam (RESTORIL) 15 mg Cap Take 1 capsule (15 mg total) by mouth nightly as needed (insomnia). 30 capsule 0 12/5/2024 Bedtime    tirzepatide (MOUNJARO) 15 mg/0.5 mL PnIj Inject 15 mg into the skin every 7 days. 2 mL 5 Past Week    tiZANidine (ZANAFLEX) 4 MG tablet Take 1 tablet (4 mg total) by mouth every evening. 30 tablet 0 12/5/2024 Evening    topiramate (TOPAMAX) 200 MG Tab Take 1 tablet (200 mg total) by mouth 2 (two) times daily. 180 tablet 1 12/6/2024 Morning    VYVANSE 70 mg capsule Take 1 capsule (70 mg total) by mouth every morning. 30 capsule 0 12/6/2024 Morning    azelastine 205.5 mcg (0.15 %) Spry 1 spray by Nasal route once daily. 30 mL 2 More than a month    diclofenac (VOLTAREN) 75 MG EC tablet Take 75 mg by mouth once daily.       meclizine (ANTIVERT) 50 MG tablet Take 25 mg by mouth every 8 (eight) hours.   Unknown    permethrin (ELIMITE) 5 % cream Apply 1 application  topically once.       sucralfate (CARAFATE) 1 gram tablet Take 1 tablet (1 g total) by mouth 4 (four) times daily as needed (indigestion/acid reflux). 120 tablet 3 Unknown    sumatriptan (IMITREX) 100 MG tablet Take 1 tablet (100 mg total) by mouth every 2 (two) hours as needed for Migraine. Do need exceed 2 tablets in 24 hours.   Unknown    tamsulosin (FLOMAX) 0.4 mg Cap Take 1 capsule (0.4 mg total) by mouth once daily. 30 capsule 11 Unknown     traMADoL (ULTRAM) 50 mg tablet Take 50 mg by mouth every 8 (eight) hours as needed.       triamcinolone acetonide 0.1% (KENALOG) 0.1 % ointment Apply topically 2 (two) times daily. for 14 days 80 g 1     TRINTELLIX 20 mg Tab Take 1 tablet (20 mg total) by mouth every evening. 30 tablet 2 Unknown    valACYclovir (VALTREX) 1000 MG tablet Take 1 tablet (1,000 mg total) by mouth 2 (two) times daily. For 5 days with outbreaks. 60 tablet 2     zinc oxide 10 % Oint Apply 1 Application topically 2 (two) times a day.          Current Facility-Administered Medications   Medication Dose Route Frequency Provider Last Rate Last Admin    acetaminophen tablet 650 mg  650 mg Oral Q6H PRN Alvina Yancey DO   650 mg at 12/09/24 0518    acetaminophen tablet 650 mg  650 mg Oral TID Maryanne Camargo MD   650 mg at 12/09/24 1030    albuterol inhaler 2 puff  2 puff Inhalation Q6H PRN Alvina Yancey DO        aluminum & magnesium hydroxide-simethicone 400-400-40 mg/5 mL suspension 30 mL  30 mL Oral Q6H PRN Rafael Garcia III, MD        azelastine 137 mcg (0.1 %) nasal spray 137 mcg  1 spray Nasal BID Alvina Yancey, DO   137 mcg at 12/08/24 2215    cetirizine tablet 10 mg  10 mg Oral Daily Alvina Yancey, DO   10 mg at 12/09/24 1031    dextrose 10% bolus 125 mL 125 mL  12.5 g Intravenous PRN Maryanne Camargo MD        divalproex ER 24 hr tablet 250 mg  250 mg Oral Daily Alvina Yancey DO   250 mg at 12/08/24 0902    electrolytes-dextrose (Pedialyte) oral solution 200 mL  200 mL Oral Q4H Maryanne Camargo MD   200 mL at 12/09/24 1246    enoxaparin injection 40 mg  40 mg Subcutaneous Q24H (prophylaxis, 1700) Maryanne Camargo MD   40 mg at 12/08/24 1626    estradioL tablet 1 mg  1 mg Oral Daily Alvina Yancey DO   1 mg at 12/09/24 1030    FLUoxetine capsule 20 mg  20 mg Oral Daily Alvina Yancey,    20 mg at 12/09/24 1030    fluticasone propionate 50 mcg/actuation nasal spray 100 mcg  2 spray Each Nostril Daily Alvina Yancey,    100 mcg at  12/09/24 1033    glucagon (human recombinant) injection 1 mg  1 mg Intramuscular PRN Maryanne Camargo MD        glucose chewable tablet 24 g  24 g Oral PRN Maryanne Camargo MD        HYDROmorphone injection 0.5 mg  0.5 mg Intravenous Q4H PRN Maryanne Camargo MD   0.5 mg at 12/09/24 1523    hydrOXYzine HCL tablet 50 mg  50 mg Oral TID PRN Alvina Yancey, DO   50 mg at 12/09/24 0517    insulin aspart U-100 pen 0-5 Units  0-5 Units Subcutaneous QID (AC + HS) PRN Alvina Yancey, DO        levothyroxine tablet 125 mcg  125 mcg Oral Before breakfast Alvina Yancey, DO   125 mcg at 12/09/24 0517    lurasidone tablet 60 mg  60 mg Oral Daily Alvina Yancey, DO   60 mg at 12/08/24 0902    melatonin tablet 6 mg  6 mg Oral Nightly PRN Alvina Yancey, DO   6 mg at 12/08/24 2101    methylphenidate HCl tablet 30 mg  30 mg Oral Daily Maryanne Camargo MD   30 mg at 12/09/24 1032    midodrine tablet 10 mg  10 mg Oral TID Maryanne Camargo MD   10 mg at 12/09/24 1031    naloxone 0.4 mg/mL injection 0.02 mg  0.02 mg Intravenous PRN Alvina Yancey., DO        ondansetron injection 4 mg  4 mg Intravenous Q8H PRN Alvina Yancey, DO   4 mg at 12/07/24 2059    pantoprazole EC tablet 40 mg  40 mg Oral Daily Alvina Yancey, DO   40 mg at 12/09/24 1030    polyethylene glycol packet 17 g  17 g Oral BID Melvin Aldridge MD   17 g at 12/09/24 1029    senna tablet 8.6 mg  8.6 mg Oral BID Melvin Aldridge MD   8.6 mg at 12/09/24 1030    sodium chloride 0.9% flush 10 mL  10 mL Intravenous Q12H PRN Alvina Yancey, DO        sumatriptan tablet 100 mg  100 mg Oral Q12H PRN Alvina Yancey, DO        tamsulosin 24 hr capsule 0.4 mg  0.4 mg Oral Daily Alvina Yancey, DO   0.4 mg at 12/09/24 1030    topiramate tablet 200 mg  200 mg Oral BID Alvina Yancey, DO   200 mg at 12/09/24 1031       Interval HPI:   Patient being evaluated for adrenal insufficiency after an incomplete cortisol stimulation test was done. D10 infusion was discontinued. Repeat stim test ordered.     PMH, PSH,  ",  updated and reviewed     ROS:    Review of Systems   Constitutional:  Positive for appetite change and fatigue. Negative for fever.   Eyes:  Negative for visual disturbance.   Respiratory:  Negative for shortness of breath.    Cardiovascular:  Negative for chest pain.   Gastrointestinal:  Positive for abdominal pain, constipation, diarrhea, nausea and vomiting. Negative for blood in stool.   Neurological:  Negative for dizziness, weakness and light-headedness.       Labs Reviewed and Include:  BASELINE Creatinine:   [facility]  [unfilled]  @[facility]  Recent Labs   Lab 12/09/24  0355   GLU 87   CALCIUM 8.7   ALBUMIN 2.9*   PROT 5.7*      K 3.8   CO2 20*   *   BUN 5*   CREATININE 0.8   ALKPHOS 243*   *   *   BILITOT 0.2     Lab Results   Component Value Date    HGBA1C 4.7 12/07/2024       Nutritional status:   Body mass index is 26.34 kg/m².  Lab Results   Component Value Date    ALBUMIN 2.9 (L) 12/09/2024    ALBUMIN 2.5 (L) 12/08/2024    ALBUMIN 2.8 (L) 12/07/2024     No results found for: "PREALBUMIN"    Estimated Creatinine Clearance: 84.2 mL/min (based on SCr of 0.8 mg/dL).    POCT Glucose results:    Current Insulin Regimen:       PHYSICAL EXAMINATION:  Vitals:    12/09/24 1330   BP: 107/70   Pulse: 82   Resp: 17   Temp: 97.6 °F (36.4 °C)     Body mass index is 26.34 kg/m².     Physical Exam  Constitutional:       General: She is not in acute distress.  Eyes:      Extraocular Movements: Extraocular movements intact.   Cardiovascular:      Pulses: Normal pulses.      Heart sounds: Normal heart sounds.   Pulmonary:      Effort: Pulmonary effort is normal.      Breath sounds: Normal breath sounds.   Abdominal:      General: Abdomen is flat. There is no distension.      Palpations: Abdomen is soft.      Tenderness: There is abdominal tenderness.      Comments: Well healed abdominal scars from previous surgeries.   Musculoskeletal:      Right lower leg: No edema.      Left lower leg: No " edema.   Skin:     General: Skin is warm.   Neurological:      Mental Status: She is alert and oriented to person, place, and time.   Psychiatric:         Mood and Affect: Mood is anxious.        .     ASSESSMENT and PLAN:    Endocrine  * Hypoglycemia  Patient with episodes of mild hypoglycemia in the setting of malnutrition and recent Mounjaro use with A1c of 4.7. TSH wnl on home synthroid. Cosyntropin test revealed initial cortisol of 4.3 elevated to 13.7 after 30 mins but was not repeated after 60 mins. Hypoglycemia likely related to her malnutrition but will rule out adrenal insufficiency in patient with recent weight loss and concomitant hypotension.     Plan  - Ruling out adrenal insufficiency with repeat cosyntropin stimulation test  - Discontinued D10 infusion  - Mounjaro discontinued  - Check fingerstick glucose every 4 hours, if glucose is less than 55, please repeat POCT glucose in a different area of the patient body (ear lobe, upper arm, forearm)  - If serum glucose less than 55 on 2 simultaneous finger sticks or labs, check STAT c-peptide, insulin level, proinsulin level, renal function panel, beta hydroxybutyrate, and sulfonylurea screen  - Check STAT labs prior to treating with glucose if patient is stable (Though if patient is obtunded or hemodynamically unstable, then treat hypoglycemia immediately). Otherwise the administration of glucose may decrease the accuracy of the labs    - After labs are obtained, treat hypoglycemia with IV Dextrose bolus per protocol  - If severe hypoglycemia continues, give subcutaneous or intramuscular glucagon (1 mg) order 1x and continue to monitor glucose per hypoglycemia protocol  - If persistent hypoglycemia persists despite above treatment, notify primary team to start D5 1/2 Normal Saline vs D10 drip    - If no further documented hypoglycemia, will consider mixed meal testing or monitoring with a continuous glucose monitor (CGM)        Type 2 diabetes  mellitus  Last A1c was 4.7. Likely more insulin sensitive with continued weight loss.   Was on Mounjaro outpatient for her T2DM.    -Mounjaro discontinued with low A1c and concern over hypoglycemia  -POCT glucose q4d      Hypothyroidism  Stable. TSH 0.97 inpatient.   -Continued on home synthroid 125 mcg daily          DISCHARGE NEEDS: will assess daily    Alfonso Martini DO  Endocrinology  [facility]

## 2024-12-09 NOTE — ASSESSMENT & PLAN NOTE
Last A1c was 4.7. Likely more insulin sensitive with continued weight loss.   Was on Mounjaro outpatient for her T2DM.    -Mounjaro discontinued with low A1c and concern over hypoglycemia  -POCT glucose q4d

## 2024-12-09 NOTE — HPI
Patient is a 50 yo female with PMH of T2DM, HTN, asthma, hypothyroidism, anxiety, depression, bipolar disorder,  ADHD, migraine headache, s/p Sharon-en-Y gastric bypass 1993 with several revisions who presented to ED for evaluation of ongoing abdominal pain associated with nausea, vomiting and alternating diarrhea/constipation for last 2 years. CT abdomen with IV and oral contrast showed status post Sharon-en-Y gastric bypass. Mild wall thickening and mucosal hyperenhancement suggesting inflammation of the gastric remnant/bypassed stomach. Hypoglycemic on presentation down to 46 improved with dextrose 50 gm >> 25 gm >> D10 infusion and ultimately admitted with hypoglycemia and soft blood pressures down to 85-90 systolic.     Patient states that her symptoms have been ongoing for about two years. In this time, she has lost about 140 pounds. Her main complaint is generalized abdominal pain that is worse with eating. She will often not be able to keep food down and has loose stools frequently. Has been on Mounjaro for the past few months for her diabetes. Patient's glucose improved on D10 infusion. Patient's TSH wnl on home synthroid 125 mcg. Cortisol stimulation test performed with initial cortisol 4.3 around 1pm and 13.7 after 30 minutes but no collection after 60 minutes. Endocrinology consulted for evaluation of possible adrenal insufficiency.

## 2024-12-10 ENCOUNTER — ANESTHESIA (OUTPATIENT)
Dept: ENDOSCOPY | Facility: HOSPITAL | Age: 49
DRG: 638 | End: 2024-12-10
Payer: COMMERCIAL

## 2024-12-10 LAB
ACTH PLAS-MCNC: 25 PG/ML (ref 0–46)
ALBUMIN SERPL BCP-MCNC: 2.6 G/DL (ref 3.5–5.2)
ALP SERPL-CCNC: 228 U/L (ref 40–150)
ALT SERPL W/O P-5'-P-CCNC: 157 U/L (ref 10–44)
ANION GAP SERPL CALC-SCNC: 6 MMOL/L (ref 8–16)
ANION GAP SERPL CALC-SCNC: 7 MMOL/L (ref 8–16)
AST SERPL-CCNC: 108 U/L (ref 10–40)
BILIRUB SERPL-MCNC: 0.2 MG/DL (ref 0.1–1)
BUN SERPL-MCNC: 6 MG/DL (ref 6–20)
BUN SERPL-MCNC: 7 MG/DL (ref 6–20)
CALCIUM SERPL-MCNC: 8.1 MG/DL (ref 8.7–10.5)
CALCIUM SERPL-MCNC: 8.2 MG/DL (ref 8.7–10.5)
CHLORIDE SERPL-SCNC: 113 MMOL/L (ref 95–110)
CHLORIDE SERPL-SCNC: 114 MMOL/L (ref 95–110)
CO2 SERPL-SCNC: 17 MMOL/L (ref 23–29)
CO2 SERPL-SCNC: 22 MMOL/L (ref 23–29)
CREAT SERPL-MCNC: 0.7 MG/DL (ref 0.5–1.4)
CREAT SERPL-MCNC: 0.8 MG/DL (ref 0.5–1.4)
EST. GFR  (NO RACE VARIABLE): >60 ML/MIN/1.73 M^2
EST. GFR  (NO RACE VARIABLE): >60 ML/MIN/1.73 M^2
GLUCOSE SERPL-MCNC: 69 MG/DL (ref 70–110)
GLUCOSE SERPL-MCNC: 79 MG/DL (ref 70–110)
POCT GLUCOSE: 55 MG/DL (ref 70–110)
POCT GLUCOSE: 58 MG/DL (ref 70–110)
POCT GLUCOSE: 66 MG/DL (ref 70–110)
POCT GLUCOSE: 71 MG/DL (ref 70–110)
POCT GLUCOSE: 76 MG/DL (ref 70–110)
POCT GLUCOSE: 78 MG/DL (ref 70–110)
POCT GLUCOSE: 83 MG/DL (ref 70–110)
POCT GLUCOSE: 93 MG/DL (ref 70–110)
POTASSIUM SERPL-SCNC: 3.6 MMOL/L (ref 3.5–5.1)
POTASSIUM SERPL-SCNC: 4.4 MMOL/L (ref 3.5–5.1)
PROT SERPL-MCNC: 5.2 G/DL (ref 6–8.4)
SODIUM SERPL-SCNC: 137 MMOL/L (ref 136–145)
SODIUM SERPL-SCNC: 142 MMOL/L (ref 136–145)

## 2024-12-10 PROCEDURE — 25000003 PHARM REV CODE 250: Performed by: HOSPITALIST

## 2024-12-10 PROCEDURE — 80053 COMPREHEN METABOLIC PANEL: CPT | Performed by: INTERNAL MEDICINE

## 2024-12-10 PROCEDURE — 37000008 HC ANESTHESIA 1ST 15 MINUTES: Performed by: STUDENT IN AN ORGANIZED HEALTH CARE EDUCATION/TRAINING PROGRAM

## 2024-12-10 PROCEDURE — 0DB68ZX EXCISION OF STOMACH, VIA NATURAL OR ARTIFICIAL OPENING ENDOSCOPIC, DIAGNOSTIC: ICD-10-PCS | Performed by: STUDENT IN AN ORGANIZED HEALTH CARE EDUCATION/TRAINING PROGRAM

## 2024-12-10 PROCEDURE — 25000003 PHARM REV CODE 250: Performed by: INTERNAL MEDICINE

## 2024-12-10 PROCEDURE — A9585 GADOBUTROL INJECTION: HCPCS | Performed by: INTERNAL MEDICINE

## 2024-12-10 PROCEDURE — 27201012 HC FORCEPS, HOT/COLD, DISP: Performed by: STUDENT IN AN ORGANIZED HEALTH CARE EDUCATION/TRAINING PROGRAM

## 2024-12-10 PROCEDURE — 63600175 PHARM REV CODE 636 W HCPCS: Performed by: INTERNAL MEDICINE

## 2024-12-10 PROCEDURE — 37000009 HC ANESTHESIA EA ADD 15 MINS: Performed by: STUDENT IN AN ORGANIZED HEALTH CARE EDUCATION/TRAINING PROGRAM

## 2024-12-10 PROCEDURE — 88305 TISSUE EXAM BY PATHOLOGIST: CPT | Performed by: PATHOLOGY

## 2024-12-10 PROCEDURE — 25000003 PHARM REV CODE 250: Performed by: NURSE ANESTHETIST, CERTIFIED REGISTERED

## 2024-12-10 PROCEDURE — 94761 N-INVAS EAR/PLS OXIMETRY MLT: CPT

## 2024-12-10 PROCEDURE — 88342 IMHCHEM/IMCYTCHM 1ST ANTB: CPT | Performed by: PATHOLOGY

## 2024-12-10 PROCEDURE — 82962 GLUCOSE BLOOD TEST: CPT | Performed by: STUDENT IN AN ORGANIZED HEALTH CARE EDUCATION/TRAINING PROGRAM

## 2024-12-10 PROCEDURE — 11000001 HC ACUTE MED/SURG PRIVATE ROOM

## 2024-12-10 PROCEDURE — 43239 EGD BIOPSY SINGLE/MULTIPLE: CPT | Mod: ,,, | Performed by: STUDENT IN AN ORGANIZED HEALTH CARE EDUCATION/TRAINING PROGRAM

## 2024-12-10 PROCEDURE — A4216 STERILE WATER/SALINE, 10 ML: HCPCS | Performed by: NURSE ANESTHETIST, CERTIFIED REGISTERED

## 2024-12-10 PROCEDURE — 25000003 PHARM REV CODE 250: Performed by: FAMILY MEDICINE

## 2024-12-10 PROCEDURE — 63600175 PHARM REV CODE 636 W HCPCS: Performed by: NURSE ANESTHETIST, CERTIFIED REGISTERED

## 2024-12-10 PROCEDURE — 36415 COLL VENOUS BLD VENIPUNCTURE: CPT | Performed by: INTERNAL MEDICINE

## 2024-12-10 PROCEDURE — 80048 BASIC METABOLIC PNL TOTAL CA: CPT | Mod: XB | Performed by: INTERNAL MEDICINE

## 2024-12-10 PROCEDURE — 88312 SPECIAL STAINS GROUP 1: CPT | Performed by: PATHOLOGY

## 2024-12-10 PROCEDURE — 43239 EGD BIOPSY SINGLE/MULTIPLE: CPT | Performed by: STUDENT IN AN ORGANIZED HEALTH CARE EDUCATION/TRAINING PROGRAM

## 2024-12-10 PROCEDURE — 25500020 PHARM REV CODE 255: Performed by: INTERNAL MEDICINE

## 2024-12-10 PROCEDURE — 0DB38ZX EXCISION OF LOWER ESOPHAGUS, VIA NATURAL OR ARTIFICIAL OPENING ENDOSCOPIC, DIAGNOSTIC: ICD-10-PCS | Performed by: STUDENT IN AN ORGANIZED HEALTH CARE EDUCATION/TRAINING PROGRAM

## 2024-12-10 RX ORDER — PROPOFOL 10 MG/ML
VIAL (ML) INTRAVENOUS
Status: DISCONTINUED | OUTPATIENT
Start: 2024-12-10 | End: 2024-12-10

## 2024-12-10 RX ORDER — SODIUM CHLORIDE 0.9 % (FLUSH) 0.9 %
SYRINGE (ML) INJECTION
Status: DISCONTINUED | OUTPATIENT
Start: 2024-12-10 | End: 2024-12-10

## 2024-12-10 RX ORDER — GADOBUTROL 604.72 MG/ML
10 INJECTION INTRAVENOUS
Status: COMPLETED | OUTPATIENT
Start: 2024-12-10 | End: 2024-12-10

## 2024-12-10 RX ORDER — LORAZEPAM 0.5 MG/1
2 TABLET ORAL
Status: COMPLETED | OUTPATIENT
Start: 2024-12-10 | End: 2024-12-10

## 2024-12-10 RX ORDER — HYDROMORPHONE HYDROCHLORIDE 1 MG/ML
0.2 INJECTION, SOLUTION INTRAMUSCULAR; INTRAVENOUS; SUBCUTANEOUS EVERY 5 MIN PRN
Status: DISCONTINUED | OUTPATIENT
Start: 2024-12-10 | End: 2024-12-10 | Stop reason: HOSPADM

## 2024-12-10 RX ORDER — GLUCAGON 1 MG
1 KIT INJECTION
Status: DISCONTINUED | OUTPATIENT
Start: 2024-12-10 | End: 2024-12-10 | Stop reason: HOSPADM

## 2024-12-10 RX ORDER — LIDOCAINE HYDROCHLORIDE 20 MG/ML
INJECTION INTRAVENOUS
Status: DISCONTINUED | OUTPATIENT
Start: 2024-12-10 | End: 2024-12-10

## 2024-12-10 RX ORDER — MEPERIDINE HYDROCHLORIDE 50 MG/ML
12.5 INJECTION INTRAMUSCULAR; INTRAVENOUS; SUBCUTANEOUS ONCE AS NEEDED
Status: DISCONTINUED | OUTPATIENT
Start: 2024-12-10 | End: 2024-12-10 | Stop reason: HOSPADM

## 2024-12-10 RX ORDER — PANTOPRAZOLE SODIUM 40 MG/1
40 TABLET, DELAYED RELEASE ORAL 2 TIMES DAILY
Status: DISCONTINUED | OUTPATIENT
Start: 2024-12-10 | End: 2024-12-11 | Stop reason: HOSPADM

## 2024-12-10 RX ORDER — PROPOFOL 10 MG/ML
INJECTION, EMULSION INTRAVENOUS CONTINUOUS PRN
Status: DISCONTINUED | OUTPATIENT
Start: 2024-12-10 | End: 2024-12-10

## 2024-12-10 RX ORDER — ONDANSETRON HYDROCHLORIDE 2 MG/ML
4 INJECTION, SOLUTION INTRAVENOUS DAILY PRN
Status: DISCONTINUED | OUTPATIENT
Start: 2024-12-10 | End: 2024-12-10 | Stop reason: HOSPADM

## 2024-12-10 RX ORDER — SUCRALFATE 1 G/10ML
1 SUSPENSION ORAL
Status: DISCONTINUED | OUTPATIENT
Start: 2024-12-10 | End: 2024-12-11 | Stop reason: HOSPADM

## 2024-12-10 RX ORDER — SODIUM CHLORIDE 9 MG/ML
INJECTION, SOLUTION INTRAVENOUS CONTINUOUS
Status: DISCONTINUED | OUTPATIENT
Start: 2024-12-10 | End: 2024-12-10

## 2024-12-10 RX ADMIN — ENOXAPARIN SODIUM 40 MG: 40 INJECTION SUBCUTANEOUS at 04:12

## 2024-12-10 RX ADMIN — ESTRADIOL 1 MG: 0.5 TABLET ORAL at 11:12

## 2024-12-10 RX ADMIN — PANTOPRAZOLE SODIUM 40 MG: 40 TABLET, DELAYED RELEASE ORAL at 09:12

## 2024-12-10 RX ADMIN — CETIRIZINE HYDROCHLORIDE 10 MG: 10 TABLET, FILM COATED ORAL at 10:12

## 2024-12-10 RX ADMIN — TOPIRAMATE 200 MG: 100 TABLET, FILM COATED ORAL at 09:12

## 2024-12-10 RX ADMIN — MIDODRINE HYDROCHLORIDE 10 MG: 5 TABLET ORAL at 10:12

## 2024-12-10 RX ADMIN — SUCRALFATE ORAL SUSPENSION 1 G: 1 SUSPENSION ORAL at 09:12

## 2024-12-10 RX ADMIN — ACETAMINOPHEN 650 MG: 325 TABLET ORAL at 10:12

## 2024-12-10 RX ADMIN — MIDODRINE HYDROCHLORIDE 10 MG: 5 TABLET ORAL at 03:12

## 2024-12-10 RX ADMIN — SUCRALFATE ORAL SUSPENSION 1 G: 1 SUSPENSION ORAL at 11:12

## 2024-12-10 RX ADMIN — FLUOXETINE HYDROCHLORIDE 20 MG: 20 CAPSULE ORAL at 10:12

## 2024-12-10 RX ADMIN — HYDROMORPHONE HYDROCHLORIDE 0.5 MG: 1 INJECTION, SOLUTION INTRAMUSCULAR; INTRAVENOUS; SUBCUTANEOUS at 10:12

## 2024-12-10 RX ADMIN — PROPOFOL 80 MG: 10 INJECTION, EMULSION INTRAVENOUS at 09:12

## 2024-12-10 RX ADMIN — MIDODRINE HYDROCHLORIDE 10 MG: 5 TABLET ORAL at 09:12

## 2024-12-10 RX ADMIN — DIVALPROEX SODIUM 250 MG: 250 TABLET, EXTENDED RELEASE ORAL at 11:12

## 2024-12-10 RX ADMIN — PROPOFOL 30 MG: 10 INJECTION, EMULSION INTRAVENOUS at 09:12

## 2024-12-10 RX ADMIN — AZELASTINE 137 MCG: 1 SPRAY, METERED NASAL at 11:12

## 2024-12-10 RX ADMIN — LURASIDONE HYDROCHLORIDE 60 MG: 40 TABLET, FILM COATED ORAL at 11:12

## 2024-12-10 RX ADMIN — LIDOCAINE HYDROCHLORIDE 100 MG: 20 INJECTION INTRAVENOUS at 09:12

## 2024-12-10 RX ADMIN — TAMSULOSIN HYDROCHLORIDE 0.4 MG: 0.4 CAPSULE ORAL at 11:12

## 2024-12-10 RX ADMIN — AZELASTINE 137 MCG: 1 SPRAY, METERED NASAL at 09:12

## 2024-12-10 RX ADMIN — Medication 6 MG: at 09:12

## 2024-12-10 RX ADMIN — ACETAMINOPHEN 650 MG: 325 TABLET ORAL at 03:12

## 2024-12-10 RX ADMIN — FLUTICASONE PROPIONATE 100 MCG: 50 SPRAY, METERED NASAL at 11:12

## 2024-12-10 RX ADMIN — PROPOFOL 200 MCG/KG/MIN: 10 INJECTION, EMULSION INTRAVENOUS at 09:12

## 2024-12-10 RX ADMIN — ACETAMINOPHEN 650 MG: 325 TABLET ORAL at 09:12

## 2024-12-10 RX ADMIN — Medication 200 ML: at 11:12

## 2024-12-10 RX ADMIN — PANTOPRAZOLE SODIUM 40 MG: 40 TABLET, DELAYED RELEASE ORAL at 11:12

## 2024-12-10 RX ADMIN — HYDROMORPHONE HYDROCHLORIDE 0.5 MG: 1 INJECTION, SOLUTION INTRAMUSCULAR; INTRAVENOUS; SUBCUTANEOUS at 07:12

## 2024-12-10 RX ADMIN — LORAZEPAM 2 MG: 0.5 TABLET ORAL at 09:12

## 2024-12-10 RX ADMIN — METHYLPHENIDATE HYDROCHLORIDE 30 MG: 5 TABLET ORAL at 10:12

## 2024-12-10 RX ADMIN — ACETAMINOPHEN 650 MG: 325 TABLET ORAL at 11:12

## 2024-12-10 RX ADMIN — LEVOTHYROXINE SODIUM 125 MCG: 25 TABLET ORAL at 05:12

## 2024-12-10 RX ADMIN — GADOBUTROL 10 ML: 604.72 INJECTION INTRAVENOUS at 10:12

## 2024-12-10 RX ADMIN — SODIUM CHLORIDE 10 ML: 9 INJECTION, SOLUTION INTRAMUSCULAR; INTRAVENOUS; SUBCUTANEOUS at 09:12

## 2024-12-10 RX ADMIN — ALUMINUM HYDROXIDE, MAGNESIUM HYDROXIDE, AND DIMETHICONE 30 ML: 400; 400; 40 SUSPENSION ORAL at 07:12

## 2024-12-10 NOTE — NURSING TRANSFER
Nursing Transfer Note      12/10/2024   10:08 AM    Nurse giving handoff:COLUMBA Ramirez RN   Nurse receiving handoff:DOREEN Cline RN     Reason patient is being transferred: post procedure     Transfer To: 1143    Transfer via stretcher    Transported by transport     Any special needs or follow-up needed: routine     Chart send with patient: Yes    Patient reassessed at: 0935 on 12/10

## 2024-12-10 NOTE — SUBJECTIVE & OBJECTIVE
"Interval HPI:   Results of cosyntropin test inconsistent with adrenal insufficiency. Glucose serum values remain over 70. Had one POCT reading of 55 and patient was given cranberry juice.     PMH, PSH, FH, SH updated and reviewed     ROS:    Review of Systems   Constitutional:  Positive for appetite change and fatigue. Negative for fever.   Eyes:  Negative for visual disturbance.   Respiratory:  Negative for shortness of breath.    Cardiovascular:  Negative for chest pain.   Gastrointestinal:  Positive for abdominal pain, constipation, diarrhea, nausea and vomiting. Negative for blood in stool.   Neurological:  Negative for dizziness, weakness and light-headedness.       Labs Reviewed and Include:  BASELINE Creatinine:   [unfilled]  [unfilled]  [unfilled]  Recent Labs   Lab 12/10/24  0344   GLU 79   CALCIUM 8.2*   ALBUMIN 2.6*   PROT 5.2*      K 3.6   CO2 22*   *   BUN 6   CREATININE 0.8   ALKPHOS 228*   *   *   BILITOT 0.2     Lab Results   Component Value Date    HGBA1C 4.7 12/07/2024       Nutritional status:   Body mass index is 26.34 kg/m².  Lab Results   Component Value Date    ALBUMIN 2.6 (L) 12/10/2024    ALBUMIN 3.0 (L) 12/09/2024    ALBUMIN 2.9 (L) 12/09/2024     No results found for: "PREALBUMIN"    Estimated Creatinine Clearance: 84.2 mL/min (based on SCr of 0.8 mg/dL).    POCT Glucose results:    Current Insulin Regimen:       PHYSICAL EXAMINATION:  Vitals:    12/10/24 1123   BP: 103/65   Pulse: 73   Resp: 18   Temp: 97.3 °F (36.3 °C)     Body mass index is 26.34 kg/m².     "

## 2024-12-10 NOTE — TRANSFER OF CARE
Anesthesia Transfer of Care Note    Patient: Tayla Lambert    Procedure(s) Performed: Procedure(s) (LRB):  EGD (ESOPHAGOGASTRODUODENOSCOPY) (N/A)    Patient location: PACU    Anesthesia Type: general    Transport from OR: Transported from OR on room air with adequate spontaneous ventilation    Post pain: adequate analgesia    Post assessment: no apparent anesthetic complications    Post vital signs: stable    Level of consciousness: responds to stimulation and sedated    Nausea/Vomiting: no nausea/vomiting    Complications: none    Transfer of care protocol was followed      Last vitals: Visit Vitals  BP 94/64   Pulse 81   Temp 97.3   Resp 20   Ht    Wt    SpO2 99%   Breastfeeding    BMI

## 2024-12-10 NOTE — DISCHARGE INSTRUCTIONS
**STOP Mounjaro  **STOP diclofenac      Our goal at [facility] is to always give you outstanding care and exceptional service. You may receive a survey by mail, text or e-mail in the next 7-10 days from Shahid Perez and our leadership team asking about the care you received with us. The survey should only take 5-10 minutes to complete and is very important to us.     Your feedback provides us with a way to recognize our staff who work tirelessly to provide the best care! Also, your responses help us learn how to improve when your experience was below our aspiration of excellence. We WILL use your feedback to continue making improvements to help us provide the highest quality care. We keep your personal information and feedback confidential. We appreciate your time completing this survey and can't wait to hear from you!!!     We want you to leave us today feeling like you can DEFINITELY recommend us to others! We look forward to your continued care with us! Thanks so much for choosing [facility] for your healthcare needs!

## 2024-12-10 NOTE — PLAN OF CARE
[facility] Tele[facility]  Discharge Reassessment    Primary Care Provider: Iliana Merritt MD    Expected Discharge Date: 12/11/2024    Reassessment (most recent)       Discharge Reassessment - 12/09/24 1600          Discharge Reassessment    Assessment Type Discharge Planning Reassessment (P)      Did the patient's condition or plan change since previous assessment? No (P)      Discharge Plan discussed with: Patient (P)      Communicated JASPER with patient/caregiver Yes (P)      Discharge Plan A Home with family (P)      Discharge Plan B Home Health;Home with family (P)      DME Needed Upon Discharge  none (P)      Transition of Care Barriers None (P)                  Discharge Plan A and Plan B have been determined by review of patient's clinical status, future medical and therapeutic needs, and coverage/benefits for post-acute care in coordination with multidisciplinary team members.     SW met w/ patient at the bedside for dc planning. SW offered to provide patient w/ JOSE A forms as requested by attending MD. Patient states that she received forms and her spouse will submit via medical records department; no additional forms needed.     Patient expressed no concerns regarding dispo.  Patient to dc home w/ family w/ or w/o HH pending medical clearance.              TABITHA Deleon, LMSW  [facility]  Case Management  Ext. [phone]

## 2024-12-10 NOTE — ANESTHESIA POSTPROCEDURE EVALUATION
Anesthesia Post Evaluation    Patient: Tayla Lambert    Procedure(s) Performed: Procedure(s) (LRB):  EGD (ESOPHAGOGASTRODUODENOSCOPY) (N/A)    Final Anesthesia Type: general      Patient location during evaluation: PACU  Patient participation: Yes- Able to Participate  Level of consciousness: awake and alert  Post-procedure vital signs: reviewed and stable  Pain management: adequate  Airway patency: patent    PONV status at discharge: No PONV  Anesthetic complications: no      Cardiovascular status: blood pressure returned to baseline  Respiratory status: spontaneous ventilation and room air  Hydration status: euvolemic  Follow-up not needed.              Vitals Value Taken Time   /65 12/10/24 1123   Temp 36.3 °C (97.3 °F) 12/10/24 1123   Pulse 73 12/10/24 1123   Resp 18 12/10/24 1123   SpO2 100 % 12/10/24 1123         Event Time   Out of Recovery 10:06:00         Pain/Umm Score: Pain Rating Prior to Med Admin: 2 (12/10/2024 11:26 AM)  Umm Score: 10 (12/10/2024  9:45 AM)

## 2024-12-10 NOTE — PLAN OF CARE
Problem: Fatigue  Goal: Improved Activity Tolerance  Outcome: Progressing     Problem: Syncope  Goal: Absence of Syncopal Symptoms  Outcome: Progressing     Problem: Fall Injury Risk  Goal: Absence of Fall and Fall-Related Injury  Outcome: Progressing     Pt obtained EGD today, remain NPO Raman arreguin MD notified (monitoring for less than 55). Q1H accucheck. MRI abd ordered. VVS.     Pt ate dinner and had abd pian. BS was 79 after 2 hours eating. Back to Q1H accucheck.

## 2024-12-10 NOTE — PROGRESS NOTES
Patient's BG 55 on arrival to PACU. Patient asymptomatic. Two cups of cranberry juice given to patient. Floor IMTA RN, Marlyn jensen.

## 2024-12-10 NOTE — PT/OT/SLP PROGRESS
Physical Therapy      Patient Name:  Tayla Lambert   MRN:  [id number]    Patient not seen today secondary to Patient out on procedure. Will follow-up on next scheduled visit.

## 2024-12-10 NOTE — ASSESSMENT & PLAN NOTE
Patient with episodes of mild hypoglycemia in the setting of malnutrition and recent Mounjaro use with A1c of 4.7. TSH wnl on home synthroid. Cosyntropin test revealed initial cortisol of 4.3 elevated to 13.7 after 30 mins but was not repeated after 60 mins. Hypoglycemia likely related to her malnutrition but will rule out adrenal insufficiency in patient with recent weight loss and concomitant hypotension.     Plan  - Cosytropin test inconsistent with adrenal insufficiency  - Discontinued D10 infusion  - Mounjaro discontinued  - Unlikely true hypoglycemia in patient with serum glucose values persistently over 70  - Check fingerstick glucose every 4 hours, if glucose is less than 55, please repeat POCT glucose in a different area of the patient body (ear lobe, upper arm, forearm)  - If serum glucose less than 55 on 2 simultaneous finger sticks or labs, check STAT c-peptide, insulin level, proinsulin level, renal function panel, beta hydroxybutyrate, and sulfonylurea screen  - Check STAT labs prior to treating with glucose if patient is stable (Though if patient is obtunded or hemodynamically unstable, then treat hypoglycemia immediately). Otherwise the administration of glucose may decrease the accuracy of the labs    - After labs are obtained, treat hypoglycemia with IV Dextrose bolus per protocol  - If severe hypoglycemia continues, give subcutaneous or intramuscular glucagon (1 mg) order 1x and continue to monitor glucose per hypoglycemia protocol  - If persistent hypoglycemia persists despite above treatment, notify primary team to start D5 1/2 Normal Saline vs D10 drip    - If no further documented hypoglycemia, will consider mixed meal testing or monitoring with a continuous glucose monitor (CGM)

## 2024-12-10 NOTE — PROGRESS NOTES
[facility] Tele[facility]  Endocrinology  Progress Note    Admit Date: 12/6/2024     Patient is a 50 yo female with PMH of T2DM, HTN, asthma, hypothyroidism, anxiety, depression, bipolar disorder,  ADHD, migraine headache, s/p Sharon-en-Y gastric bypass 1993 with several revisions who presented to ED for evaluation of ongoing abdominal pain associated with nausea, vomiting and alternating diarrhea/constipation for last 2 years. CT abdomen with IV and oral contrast showed status post Sharon-en-Y gastric bypass. Mild wall thickening and mucosal hyperenhancement suggesting inflammation of the gastric remnant/bypassed stomach. Hypoglycemic on presentation down to 46 improved with dextrose 50 gm >> 25 gm >> D10 infusion and ultimately admitted with hypoglycemia and soft blood pressures down to 85-90 systolic.     Patient states that her symptoms have been ongoing for about two years. In this time, she has lost about 140 pounds. Her main complaint is generalized abdominal pain that is worse with eating. She will often not be able to keep food down and has loose stools frequently. Has been on Mounjaro for the past few months for her diabetes. Patient's glucose improved on D10 infusion. Patient's TSH wnl on home synthroid 125 mcg. Cortisol stimulation test performed with initial cortisol 4.3 around 1pm and 13.7 after 30 minutes but no collection after 60 minutes. Endocrinology consulted for evaluation of possible adrenal insufficiency.     Interval HPI:   Results of cosyntropin test inconsistent with adrenal insufficiency. Glucose serum values remain over 70. Had one POCT reading of 55 and patient was given cranberry juice.     PMH, PSH, FH, SH updated and reviewed     ROS:    Review of Systems   Constitutional:  Positive for appetite change and fatigue. Negative for fever.   Eyes:  Negative for visual disturbance.   Respiratory:  Negative for shortness of breath.    Cardiovascular:  Negative for chest pain.  "  Gastrointestinal:  Positive for abdominal pain, constipation, diarrhea, nausea and vomiting. Negative for blood in stool.   Neurological:  Negative for dizziness, weakness and light-headedness.       Labs Reviewed and Include:  BASELINE Creatinine:   [unfilled]  [unfilled]  [unfilled]  Recent Labs   Lab 12/10/24  0344   GLU 79   CALCIUM 8.2*   ALBUMIN 2.6*   PROT 5.2*      K 3.6   CO2 22*   *   BUN 6   CREATININE 0.8   ALKPHOS 228*   *   *   BILITOT 0.2     Lab Results   Component Value Date    HGBA1C 4.7 12/07/2024       Nutritional status:   Body mass index is 26.34 kg/m².  Lab Results   Component Value Date    ALBUMIN 2.6 (L) 12/10/2024    ALBUMIN 3.0 (L) 12/09/2024    ALBUMIN 2.9 (L) 12/09/2024     No results found for: "PREALBUMIN"    Estimated Creatinine Clearance: 84.2 mL/min (based on SCr of 0.8 mg/dL).    POCT Glucose results:    Current Insulin Regimen:       PHYSICAL EXAMINATION:  Vitals:    12/10/24 1123   BP: 103/65   Pulse: 73   Resp: 18   Temp: 97.3 °F (36.3 °C)     Body mass index is 26.34 kg/m².       ASSESSMENT and PLAN    Endocrine  * Hypoglycemia  Patient with episodes of mild hypoglycemia in the setting of malnutrition and recent Mounjaro use with A1c of 4.7. TSH wnl on home synthroid. Cosyntropin test revealed initial cortisol of 4.3 elevated to 13.7 after 30 mins but was not repeated after 60 mins. Hypoglycemia likely related to her malnutrition but will rule out adrenal insufficiency in patient with recent weight loss and concomitant hypotension.     Plan  - Cosytropin test inconsistent with adrenal insufficiency  - Discontinued D10 infusion  - Mounjaro discontinued  - Unlikely true hypoglycemia in patient with serum glucose values persistently over 70  - Check fingerstick glucose every 4 hours, if glucose is less than 55, please repeat POCT glucose in a different area of the patient body (ear lobe, upper arm, forearm)  - If serum glucose less than 55 on 2 " simultaneous finger sticks or labs, check STAT c-peptide, insulin level, proinsulin level, renal function panel, beta hydroxybutyrate, and sulfonylurea screen  - Check STAT labs prior to treating with glucose if patient is stable (Though if patient is obtunded or hemodynamically unstable, then treat hypoglycemia immediately). Otherwise the administration of glucose may decrease the accuracy of the labs    - After labs are obtained, treat hypoglycemia with IV Dextrose bolus per protocol  - If severe hypoglycemia continues, give subcutaneous or intramuscular glucagon (1 mg) order 1x and continue to monitor glucose per hypoglycemia protocol  - If persistent hypoglycemia persists despite above treatment, notify primary team to start D5 1/2 Normal Saline vs D10 drip    - If no further documented hypoglycemia, will consider mixed meal testing or monitoring with a continuous glucose monitor (CGM)        Hypothyroidism  Stable. TSH 0.97 inpatient.   -Continued on home synthroid 125 mcg daily      Endocrinology will sign off. Please do not hesitate to call back for changes in patient's clinical course.    Alfonso Martini, DO  Endocrinology  [facility]

## 2024-12-10 NOTE — H&P
Short Stay Endoscopy History and Physical    PCP - Iliana Merritt MD     Procedure - EGD  ASA - per anesthesia  Mallampati - per anesthesia  History of Anesthesia problems - no  Family history Anesthesia problems -  no   Plan of anesthesia - General    HPI:  This is a 49 y.o. female here for evaluation of : n/v, abdominal pain       ROS:  Constitutional: No fevers, chills, No weight loss  CV: No chest pain  Pulm: No cough, No shortness of breath  Ophtho: No vision changes  GI: see HPI  Derm: No rash    Medical History:  has a past medical history of ADHD (attention deficit hyperactivity disorder), Anxiety, Asthma, Depression, Diabetes mellitus, Encounter for blood transfusion, Essential hypertension (2022), Hypothyroidism (2020), Kidney stone (2022), Migraine without status migrainosus, not intractable (2020), Rheumatoid arthritis involving both hands with positive rheumatoid factor (2018), T12 burst fracture, and Tremors of nervous system (2020).    Surgical History:  has a past surgical history that includes Hysterectomy; Gastric bypass (); Cholecystectomy; Abdominal surgery; Appendectomy; Tonsillectomy;  section (); and Back surgery (2018).    Family History: family history includes Breast cancer in her maternal aunt, maternal aunt, and maternal grandmother; Diabetes in her mother; Hypertension in her father; Tremor in her brother and father.. Otherwise no colon cancer, inflammatory bowel disease, or GI malignancies.    Social History:  reports that she has never smoked. She has never used smokeless tobacco. She reports that she does not currently use alcohol. She reports that she does not use drugs.    Review of patient's allergies indicates:  No Known Allergies    Medications:   Medications Prior to Admission   Medication Sig Dispense Refill Last Dose/Taking    acetaminophen-codeine 300-30mg (TYLENOL #3) 300-30 mg Tab Take 1 tablet by  mouth 2 (two) times daily. 40 tablet 0 Past Month    albuterol (PROVENTIL/VENTOLIN HFA) 90 mcg/actuation inhaler Inhale 2 puffs into the lungs every 6 (six) hours as needed for Wheezing. Rescue 18 g 5 12/6/2024 Morning    cariprazine (VRAYLAR) 1.5 mg Cap Take 1 capsule (1.5 mg total) by mouth every evening. 30 capsule 1 12/5/2024 Evening    cetirizine (ZYRTEC) 10 MG tablet Take 1 tablet (10 mg total) by mouth once daily. 30 tablet 2 12/6/2024    cyanocobalamin 1,000 mcg/mL injection Inject 1 mL (1,000 mcg total) into the muscle every 30 days. Inject 1 mL into the muscle every month 3 mL 3 Past Month    dicyclomine (BENTYL) 20 mg tablet Take 20 mg by mouth.   12/5/2024 Evening    divalproex ER (DEPAKOTE ER) 250 MG 24 hr tablet Take 1 tablet (250 mg total) by mouth once daily. 30 tablet 0 12/6/2024 Morning    ergocalciferol (VITAMIN D2) 50,000 unit Cap Take 50,000 Units by mouth every 7 days.   12/5/2024 Morning    estradioL (ESTRACE) 1 MG tablet Take 1 tablet (1 mg total) by mouth once daily. 30 tablet 2 12/6/2024 Morning    FLUoxetine 20 MG capsule Take 1 capsule (20 mg total) by mouth once daily. 30 capsule 1 12/6/2024 Morning    fluticasone propionate (FLONASE) 50 mcg/actuation nasal spray 2 sprays (100 mcg total) by Each Nostril route once daily 16 g 11 12/6/2024 Morning    gabapentin (NEURONTIN) 300 MG capsule Take 1 capsule (300 mg total) by mouth 3 (three) times daily. 90 capsule 1 12/6/2024 Noon    hydrOXYzine (ATARAX) 50 MG tablet Take 1 tablet (50 mg total) by mouth 3 (three) times daily as needed for Anxiety. 90 tablet 2 12/6/2024 Morning    levothyroxine (SYNTHROID) 125 MCG tablet Take 1 tablet (125 mcg total) by mouth before breakfast. 30 tablet 0 12/6/2024 Morning    lurasidone (LATUDA) 60 mg Tab tablet Take 1 tablet (60 mg total) by mouth once daily. 30 tablet 1 12/5/2024 Evening    olmesartan (BENICAR) 40 MG tablet Take 1 tablet (40 mg total) by mouth once daily. 90 tablet 3 12/6/2024 Morning     pantoprazole (PROTONIX) 40 MG tablet Take 1 tablet (40 mg total) by mouth once daily. 30 tablet 2 12/6/2024 Morning    temazepam (RESTORIL) 15 mg Cap Take 1 capsule (15 mg total) by mouth nightly as needed (insomnia). 30 capsule 0 12/5/2024 Bedtime    tirzepatide (MOUNJARO) 15 mg/0.5 mL PnIj Inject 15 mg into the skin every 7 days. 2 mL 5 Past Week    tiZANidine (ZANAFLEX) 4 MG tablet Take 1 tablet (4 mg total) by mouth every evening. 30 tablet 0 12/5/2024 Evening    topiramate (TOPAMAX) 200 MG Tab Take 1 tablet (200 mg total) by mouth 2 (two) times daily. 180 tablet 1 12/6/2024 Morning    VYVANSE 70 mg capsule Take 1 capsule (70 mg total) by mouth every morning. 30 capsule 0 12/6/2024 Morning    azelastine 205.5 mcg (0.15 %) Spry 1 spray by Nasal route once daily. 30 mL 2 More than a month    diclofenac (VOLTAREN) 75 MG EC tablet Take 75 mg by mouth once daily.       meclizine (ANTIVERT) 50 MG tablet Take 25 mg by mouth every 8 (eight) hours.   Unknown    permethrin (ELIMITE) 5 % cream Apply 1 application  topically once.       sucralfate (CARAFATE) 1 gram tablet Take 1 tablet (1 g total) by mouth 4 (four) times daily as needed (indigestion/acid reflux). 120 tablet 3 Unknown    sumatriptan (IMITREX) 100 MG tablet Take 1 tablet (100 mg total) by mouth every 2 (two) hours as needed for Migraine. Do need exceed 2 tablets in 24 hours.   Unknown    tamsulosin (FLOMAX) 0.4 mg Cap Take 1 capsule (0.4 mg total) by mouth once daily. 30 capsule 11 Unknown    traMADoL (ULTRAM) 50 mg tablet Take 50 mg by mouth every 8 (eight) hours as needed.       triamcinolone acetonide 0.1% (KENALOG) 0.1 % ointment Apply topically 2 (two) times daily. for 14 days 80 g 1     TRINTELLIX 20 mg Tab Take 1 tablet (20 mg total) by mouth every evening. 30 tablet 2 Unknown    valACYclovir (VALTREX) 1000 MG tablet Take 1 tablet (1,000 mg total) by mouth 2 (two) times daily. For 5 days with outbreaks. 60 tablet 2     zinc oxide 10 % Oint Apply 1  Application topically 2 (two) times a day.          Physical Exam:    Vital Signs:   Vitals:    12/10/24 0824   BP: 129/75   Pulse: 60   Resp: 16   Temp: 97.9 °F (36.6 °C)       General Appearance: Well appearing in no acute distress  Eyes:    No scleral icterus  ENT: Neck supple, Lips, mucosa, and tongue normal; teeth and gums normal  Abdomen: Soft, non tender, non distended with normal bowel sounds. No hepatosplenomegaly, ascites, or mass.  Extremities: No edema  Skin: No rash    Labs:  Lab Results   Component Value Date    WBC 4.68 12/09/2024    HGB 11.7 (L) 12/09/2024    HCT 38.1 12/09/2024     12/09/2024    CHOL 204 (H) 11/04/2024    TRIG 97 11/04/2024    HDL 70 11/04/2024    LDLDIRECT 124.0 11/04/2024     (H) 12/10/2024     (H) 12/10/2024     12/10/2024    K 3.6 12/10/2024     (H) 12/10/2024    CREATININE 0.8 12/10/2024    BUN 6 12/10/2024    CO2 22 (L) 12/10/2024    TSH 0.977 12/07/2024    INR 1.0 11/23/2024    HGBA1C 4.7 12/07/2024       I have explained the risks and benefits of endoscopy procedures to the patient including but not limited to bleeding, perforation, infection, and death.  The patient was asked if they understand and allowed to ask any further questions to their satisfaction.      Greg Izquierdo DO

## 2024-12-10 NOTE — NURSING
Pt came back from EGD, glucose 66, MD aware, still keep pt NPO. A&Ox4. 10/10 abd pain. Biopsy obtained per PACU nurse.

## 2024-12-10 NOTE — TREATMENT PLAN
GI Treatment Plan    EGD 12/10  Impression:              - Esophageal with appearance of esophagitis                          dissecans. Biopsied.                          - Sharon-en-Y gastrojejunostomy with gastrojejunal                          anastomosis characterized by healthy appearing                          mucosa. Biopsied.                          - Normal examined jejunum.   Recommendation:          - [facility] to [facility] for ongoing care.                          - Resume previous diet.                          - Follow an antireflux regimen.                          - Continue present medications.                          - Await pathology results.                          - The findings and recommendations were discussed                          with the referring physician.                          - The findings and recommendations were discussed                          with the patient.                          - The findings and recommendations were discussed                          with the patient's family.       Continue aggressive bowel regimen on discharge. We will set up a clinic appt for her regarding chronic abdominal pain.    GI will sign off.     Asiya Alba MD  Gastroenterology and Hepatology Fellow, PGY-4

## 2024-12-10 NOTE — [employer]ATION PATIENT INSTRUCTIONS
Discharge Summary/Instructions after an Endoscopic Procedure  Patient Name: Tayla Lambert  Patient MRN: [id number]  Patient YOB: 1975  Tuesday, December 10, 2024  Hua Ibarra MD  Dear patient,  As a result of recent federal legislation (The Federal Cures Act), you may   receive lab or pathology results from your procedure in your [facility]   account before your physician is able to contact you. Your physician or   their representative will relay the results to you with their   recommendations at their soonest availability.  Thank you,  RESTRICTIONS:  During your procedure today, you received medications for sedation.  These   medications may affect your judgment, balance and coordination.  Therefore,   for 24 hours, you have the following restrictions:   - DO NOT drive a car, operate machinery, make legal/financial decisions,   sign important papers or drink alcohol.    ACTIVITY:  Today: no heavy lifting, straining or running due to procedural   sedation/anesthesia.  The following day: return to full activity including work.  DIET:  Eat and drink normally unless instructed otherwise.     TREATMENT FOR COMMON SIDE EFFECTS:  - Mild abdominal pain, nausea, belching, bloating or excessive gas:  rest,   eat lightly and use a heating pad.  - Sore Throat: treat with throat lozenges and/or gargle with warm salt   water.  - Because air was used during the procedure, expelling large amounts of air   from your rectum or belching is normal.  - If a bowel prep was taken, you may not have a bowel movement for 1-3 days.    This is normal.  SYMPTOMS TO WATCH FOR AND REPORT TO YOUR PHYSICIAN:  1. Abdominal pain or bloating, other than gas cramps.  2. Chest pain.  3. Back pain.  4. Signs of infection such as: chills or fever occurring within 24 hours   after the procedure.  5. Rectal bleeding, which would show as bright red, maroon, or black stools.   (A tablespoon of blood from the rectum is not serious, especially if    hemorrhoids are present.)  6. Vomiting.  7. Weakness or dizziness.  GO DIRECTLY TO THE NEAREST EMERGENCY ROOM IF YOU HAVE ANY OF THE FOLLOWING:      Difficulty breathing              Chills and/or fever over 101 F   Persistent vomiting and/or vomiting blood   Severe abdominal pain   Severe chest pain   Black, tarry stools   Bleeding- more than one tablespoon   Any other symptom or condition that you feel may need urgent attention  Your doctor recommends these additional instructions:  If any biopsies were taken, your doctors clinic will contact you in 1 to 2   weeks with any results.  - Return patient to [facility] for ongoing care.   - Resume previous diet.   - Follow an antireflux regimen.   - Continue present medications.   - Await pathology results.   - The findings and recommendations were discussed with the referring   physician.   - The findings and recommendations were discussed with the patient.   - The findings and recommendations were discussed with the patient's   family.  For questions, problems or results please call your physician - Hua Ibarra MD at Work:  [phone].  [facility], EMERGENCY ROOM PHONE NUMBER: [phone]  IF A COMPLICATION OR EMERGENCY SITUATION ARISES AND YOU ARE UNABLE TO REACH   YOUR PHYSICIAN - GO DIRECTLY TO THE EMERGENCY ROOM.  MD Hua Lea MD  12/10/2024 9:49:04 AM  This report has been verified and signed electronically.  Dear patient,  As a result of recent federal legislation (The Federal Cures Act), you may   receive lab or pathology results from your procedure in your [facility]   account before your physician is able to contact you. Your physician or   their representative will relay the results to you with their   recommendations at their soonest availability.  Thank you,  [employer]

## 2024-12-11 VITALS
OXYGEN SATURATION: 98 % | HEIGHT: 66 IN | HEART RATE: 81 BPM | SYSTOLIC BLOOD PRESSURE: 116 MMHG | TEMPERATURE: 98 F | RESPIRATION RATE: 18 BRPM | BODY MASS INDEX: 25.83 KG/M2 | WEIGHT: 160.69 LBS | DIASTOLIC BLOOD PRESSURE: 81 MMHG

## 2024-12-11 LAB
ALBUMIN SERPL BCP-MCNC: 2.7 G/DL (ref 3.5–5.2)
ALP SERPL-CCNC: 291 U/L (ref 40–150)
ALT SERPL W/O P-5'-P-CCNC: 284 U/L (ref 10–44)
ANION GAP SERPL CALC-SCNC: 7 MMOL/L (ref 8–16)
AST SERPL-CCNC: 220 U/L (ref 10–40)
BILIRUB SERPL-MCNC: 0.2 MG/DL (ref 0.1–1)
BUN SERPL-MCNC: 10 MG/DL (ref 6–20)
CALCIUM SERPL-MCNC: 8.3 MG/DL (ref 8.7–10.5)
CHLORIDE SERPL-SCNC: 111 MMOL/L (ref 95–110)
CO2 SERPL-SCNC: 23 MMOL/L (ref 23–29)
CREAT SERPL-MCNC: 0.8 MG/DL (ref 0.5–1.4)
EST. GFR  (NO RACE VARIABLE): >60 ML/MIN/1.73 M^2
GLUCOSE SERPL-MCNC: 70 MG/DL (ref 70–110)
POCT GLUCOSE: 100 MG/DL (ref 70–110)
POCT GLUCOSE: 73 MG/DL (ref 70–110)
POCT GLUCOSE: 85 MG/DL (ref 70–110)
POTASSIUM SERPL-SCNC: 3.6 MMOL/L (ref 3.5–5.1)
PROT SERPL-MCNC: 5.4 G/DL (ref 6–8.4)
SODIUM SERPL-SCNC: 141 MMOL/L (ref 136–145)

## 2024-12-11 PROCEDURE — 80053 COMPREHEN METABOLIC PANEL: CPT | Performed by: INTERNAL MEDICINE

## 2024-12-11 PROCEDURE — 25000003 PHARM REV CODE 250: Performed by: INTERNAL MEDICINE

## 2024-12-11 PROCEDURE — 36415 COLL VENOUS BLD VENIPUNCTURE: CPT | Performed by: INTERNAL MEDICINE

## 2024-12-11 PROCEDURE — 25000003 PHARM REV CODE 250: Performed by: HOSPITALIST

## 2024-12-11 PROCEDURE — 63600175 PHARM REV CODE 636 W HCPCS: Performed by: INTERNAL MEDICINE

## 2024-12-11 RX ORDER — MULTIVIT WITH MINERALS/HERBS
1 TABLET ORAL DAILY
COMMUNITY
Start: 2024-12-11

## 2024-12-11 RX ORDER — HYDROCODONE BITARTRATE AND ACETAMINOPHEN 10; 325 MG/1; MG/1
1 TABLET ORAL EVERY 6 HOURS PRN
Qty: 28 TABLET | Refills: 0 | Status: SHIPPED | OUTPATIENT
Start: 2024-12-11

## 2024-12-11 RX ORDER — PANTOPRAZOLE SODIUM 40 MG/1
TABLET, DELAYED RELEASE ORAL
Qty: 30 TABLET | Refills: 2 | Status: SHIPPED | OUTPATIENT
Start: 2024-12-11 | End: 2026-02-05

## 2024-12-11 RX ADMIN — LURASIDONE HYDROCHLORIDE 60 MG: 40 TABLET, FILM COATED ORAL at 09:12

## 2024-12-11 RX ADMIN — FLUTICASONE PROPIONATE 100 MCG: 50 SPRAY, METERED NASAL at 09:12

## 2024-12-11 RX ADMIN — ESTRADIOL 1 MG: 0.5 TABLET ORAL at 09:12

## 2024-12-11 RX ADMIN — CETIRIZINE HYDROCHLORIDE 10 MG: 10 TABLET, FILM COATED ORAL at 09:12

## 2024-12-11 RX ADMIN — HYDROMORPHONE HYDROCHLORIDE 0.5 MG: 1 INJECTION, SOLUTION INTRAMUSCULAR; INTRAVENOUS; SUBCUTANEOUS at 09:12

## 2024-12-11 RX ADMIN — DIVALPROEX SODIUM 250 MG: 250 TABLET, EXTENDED RELEASE ORAL at 09:12

## 2024-12-11 RX ADMIN — METHYLPHENIDATE HYDROCHLORIDE 30 MG: 5 TABLET ORAL at 09:12

## 2024-12-11 RX ADMIN — SUCRALFATE ORAL SUSPENSION 1 G: 1 SUSPENSION ORAL at 09:12

## 2024-12-11 RX ADMIN — AZELASTINE 137 MCG: 1 SPRAY, METERED NASAL at 09:12

## 2024-12-11 RX ADMIN — PANTOPRAZOLE SODIUM 40 MG: 40 TABLET, DELAYED RELEASE ORAL at 09:12

## 2024-12-11 RX ADMIN — LEVOTHYROXINE SODIUM 125 MCG: 25 TABLET ORAL at 06:12

## 2024-12-11 RX ADMIN — FLUOXETINE HYDROCHLORIDE 20 MG: 20 CAPSULE ORAL at 09:12

## 2024-12-11 RX ADMIN — TAMSULOSIN HYDROCHLORIDE 0.4 MG: 0.4 CAPSULE ORAL at 09:12

## 2024-12-11 RX ADMIN — TOPIRAMATE 200 MG: 100 TABLET, FILM COATED ORAL at 09:12

## 2024-12-11 RX ADMIN — SUCRALFATE ORAL SUSPENSION 1 G: 1 SUSPENSION ORAL at 06:12

## 2024-12-11 NOTE — PLAN OF CARE
[facility] Medicine Tele[facility]  Discharge Reassessment    Primary Care Provider: Iliana Merritt MD    Expected Discharge Date: 12/11/2024    Reassessment (most recent)       Discharge Reassessment - 12/10/24 1300          Discharge Reassessment    Assessment Type Discharge Planning Reassessment     Did the patient's condition or plan change since previous assessment? No     Discharge Plan discussed with: Patient (P)      Communicated JASPER with patient/caregiver Yes (P)      Discharge Plan A Home with family (P)      Discharge Plan B Home Health;Home with family (P)      DME Needed Upon Discharge  walker, rolling (P)      Why the patient remains in the hospital Requires continued medical care (P)         Post-Acute Status    Post-Acute Authorization Home Health;HME (P)      HME Status Pending medical clearance/testing (P)      Home Health Status Referrals Sent (P)                  Discharge Plan A and Plan B have been determined by review of patient's clinical status, future medical and therapeutic needs, and coverage/benefits for post-acute care in coordination with multidisciplinary team members.     SW completed DP Reassessment at the bedside w/ patient. Patient confirmed plan of dc home w/ HH services; states that she agrees that she would benefit from having assistance w/ med management at home. Patient also requesting RW; SW to support w/ coordination of DME. Patient states that she lives in a home w/ her friend and friend will provide transport home upon dc.                TABITHA Deleon, LMSW  [facility]  Case Management  Ext. [phone]

## 2024-12-11 NOTE — PLAN OF CARE
"Discharge Plan A and Plan B have been determined by review of patient's clinical status, future medical and therapeutic needs, and coverage/benefits for post-acute care in coordination with multidisciplinary team members.    12/11/24 1241   Post-Acute Status   Post-Acute Authorization Home Health;HME   HME Status Referrals Sent   Home Health Status Pending post-acute provider review/more information requested   Discharge Plan   Discharge Plan A Home with family   Discharge Plan B Home Health;Home with family     SW reviewed epic for update on status of HH referrals. Referral w/ [facility]  remains pending. LELIA phoned [facility] [phone], spoke w/ Janet (admissions). Janet states that patient's referral has been received but needs to be reviewed by insurance team. Janet states that provider is willing to accept pending confirmation of in-network benefits. LELIA provided Janet w/ contact information for call back.    SW referred to [facility] for processing of RW order; pending OHME review.     2:40pm  LELIA confirmed w/ Janet that HH provider accepts but insurance approval could take "a couple days". Janet states that provider is able to tentatively accept upon dc today pending insurance approval. LELIA reviewed w/ attending MD and MD confirmed that patient is ok to dc w/ HH pending. SW notified Janet and Janet agreeable to tentative acceptance.  provider to f/u w/ patient directly for update on HH status post-dc.     LELIA confirmed w/ Susan ([facility]) that patient approved for RW and has been delivered to bedside.    SW reviewed the above w/ patient at bedside and patient agreeable to dc plan. LELIA supported patient w/ completion of SSDI referral form per patient's request. LELIA submitted form to SSDI Referral department via email. Patient states that friend will provide a ride home       No additional post-acute needs identified.              Ki Deleon, MSW, LMSW  [facility]  Case Management  Ext. [phone]   "

## 2024-12-11 NOTE — CONSULTS
Food & Nutrition  Education    Diet Education: Gastric Bypass diet   Time Spent: 8 minutes   Learners: Pt       Nutrition Education provided with handouts:   Education provided, foods recommended/not recommended     Comments: Spoke w/ pt at bedside, diet education (gastric bypass diet) discussed w/ pt and handouts were provided during this session. Pt had no additional questions for me today.     All questions and concerns answered. Dietitian's contact information provided.     Follow-Up: yes     Please Re-consult as needed        Thanks!

## 2024-12-11 NOTE — NURSING
AAOx4. Discharged home by personal transportation. Educated on discharge, medications, and follow up appts; voiced understanding. Prescription medications and rolling walker delivered to bedside.

## 2024-12-11 NOTE — PLAN OF CARE
Problem: Adult Inpatient Plan of Care  Goal: Plan of Care Review  Outcome: Adequate for Care Transition  Goal: Patient-Specific Goal (Individualized)  Outcome: Adequate for Care Transition  Goal: Absence of Hospital-Acquired Illness or Injury  Outcome: Adequate for Care Transition  Goal: Optimal Comfort and Wellbeing  Outcome: Adequate for Care Transition  Goal: Readiness for Transition of Care  Outcome: Adequate for Care Transition     Problem: Diabetes Comorbidity  Goal: Blood Glucose Level Within Targeted Range  Outcome: Adequate for Care Transition     Problem: Fatigue  Goal: Improved Activity Tolerance  Outcome: Adequate for Care Transition     Problem: Syncope  Goal: Absence of Syncopal Symptoms  Outcome: Adequate for Care Transition     Problem: Fall Injury Risk  Goal: Absence of Fall and Fall-Related Injury  Outcome: Adequate for Care Transition

## 2024-12-11 NOTE — PLAN OF CARE
[facility] - Internal Medicine Telemetry  Discharge Final Note    Primary Care Provider: Iliana Merritt MD    Expected Discharge Date: 12/11/2024    Final Discharge Note (most recent)       Final Note - 12/11/24 1641          Final Note    Assessment Type Final Discharge Note (P)      Anticipated Discharge Disposition Home-Health Care Svc (P)      What phone number can be called within the next 1-3 days to see how you are doing after discharge? [phone] (P)      Hospital Resources/Appts/Education Provided Community resources provided;Provided education on problems/symptoms using teachback (P)         Post-Acute Status    Post-Acute Authorization HME;Home Health (P)      HME Status Set-up Complete/Auth obtained (P)    RW    Home Health Status Pending Payor Review (P)                    Future Appointments   Date Time Provider Department Center   12/19/2024  2:15 PM [facility]   1/3/2025  2:30 PM Iliana Merritt MD [facility]   1/14/2025 11:45 AM PULMONARY LAB 2, [facility]   1/14/2025 12:00 PM PULMONARY LAB 2, [facility]   1/14/2025  1:20 PM Casper Sanchez MD [facility]   1/17/2025  3:45 PM Iliana Merritt MD [facility] Prov   2/12/2025 11:00 AM Iliana Merritt MD [facility]   3/10/2025  4:00 PM Alfonso Pete MD [facility]        Important Message from Medicare             Patent discharged w/ [facility] pending insurance auth; attending MD confirmed that patient ok to dc w/ HH pending.   Patient discharged w/ RW provided by [facility].  Patient discharged home w/ friend via personal vehicle.                TABITHA Deleon, LMSW  [facility]  Case Management  Ext. [phone]

## 2024-12-11 NOTE — PLAN OF CARE
[facility]      HOME HEALTH ORDERS  FACE TO FACE ENCOUNTER    Patient Name: Tayla Lambert  YOB: 1975    PCP: Iliana Merritt MD   PCP Address: [address] [place] [address]  PCP Phone Number: [phone]  PCP Fax: [phone]    Encounter Date: 12/6/24    Admit to Home Health    Diagnoses:  Active Diagnoses:    Diagnosis Date Noted POA    PRINCIPAL PROBLEM:  Hypoglycemia [E16.2] 11/24/2024 Yes    Hypotension [I95.9] 12/07/2024 Yes    Type 2 diabetes mellitus [E11.9] 11/25/2024 Yes    Chronic abdominal pain [R10.9, G89.29] 12/07/2024 Yes    Bipolar disorder [F31.9] 12/07/2024 Yes    Dehydration [E86.0] 11/26/2024 Yes    Vitamin B12 deficiency (non anemic) [E53.8] 02/08/2022 Yes    Hypothyroidism [E03.9] 02/04/2020 Yes    Depression [F32.A] 02/04/2020 Yes    Anxiety [F41.9] 02/04/2020 Yes    Migraine without status migrainosus, not intractable [G43.909] 02/04/2020 Yes    Attention deficit hyperactivity disorder (ADHD) with anxiety [F90.9] 02/04/2020 Yes      Problems Resolved During this Admission:       Follow Up Appointments:  Future Appointments   Date Time Provider Department Center   12/19/2024  2:15 PM Janet Morgan MD [facility]   1/3/2025  2:30 PM Iliana Merritt MD [facility] Prov   1/14/2025 11:45 AM PULMONARY LAB 2, [facility]   1/14/2025 12:00 PM PULMONARY LAB 2, [facility]   1/14/2025  1:20 PM Casper Sanchez MD [facility]   1/17/2025  3:45 PM Iliana Merritt MD [facility] Prov   2/12/2025 11:00 AM Iliana Merritt MD [facility] Prov   3/10/2025  4:00 PM Alfonso Pete MD [facility]       Allergies:Review of patient's allergies indicates:  No Known Allergies    Medications: Review discharge medications with patient and family and provide education.  Current Discharge Medication List        START taking these medications    Details   b  complex vitamins (B COMPLEX-VITAMIN B12) tablet Take 1 tablet by mouth once daily.      HYDROcodone-acetaminophen (NORCO)  mg per tablet Take 1 tablet by mouth every 6 (six) hours as needed for Pain.  Qty: 28 tablet, Refills: 0    Associated Diagnoses: Right upper quadrant abdominal pain           CONTINUE these medications which have CHANGED    Details   pantoprazole (PROTONIX) 40 MG tablet Take 1 tablet (40 mg total) by mouth 2 (two) times daily for 56 days, THEN 1 tablet (40 mg total) once daily.  Qty: 30 tablet, Refills: 2    Associated Diagnoses: Gastritis, presence of bleeding unspecified, unspecified chronicity, unspecified gastritis type           CONTINUE these medications which have NOT CHANGED    Details   acetaminophen-codeine 300-30mg (TYLENOL #3) 300-30 mg Tab Take 1 tablet by mouth 2 (two) times daily.  Qty: 40 tablet, Refills: 0    Comments: Quantity prescribed more than 7 day supply? Yes, quantity medically necessary  Associated Diagnoses: Rheumatoid arthritis involving both hands with positive rheumatoid factor; Chronic pain of right knee      albuterol (PROVENTIL/VENTOLIN HFA) 90 mcg/actuation inhaler Inhale 2 puffs into the lungs every 6 (six) hours as needed for Wheezing. Rescue  Qty: 18 g, Refills: 5    Associated Diagnoses: Mild persistent asthma, uncomplicated      cariprazine (VRAYLAR) 1.5 mg Cap Take 1 capsule (1.5 mg total) by mouth every evening.  Qty: 30 capsule, Refills: 1    Associated Diagnoses: Insomnia, unspecified type; Anxiety and depression      cetirizine (ZYRTEC) 10 MG tablet Take 1 tablet (10 mg total) by mouth once daily.  Qty: 30 tablet, Refills: 2    Associated Diagnoses: Allergic rhinitis, unspecified seasonality, unspecified trigger      cyanocobalamin 1,000 mcg/mL injection Inject 1 mL (1,000 mcg total) into the muscle every 30 days. Inject 1 mL into the muscle every month  Qty: 3 mL, Refills: 3    Associated Diagnoses: History of gastric bypass      divalproex ER  (DEPAKOTE ER) 250 MG 24 hr tablet Take 1 tablet (250 mg total) by mouth once daily.  Qty: 30 tablet, Refills: 0    Associated Diagnoses: Anxiety; Attention deficit hyperactivity disorder (ADHD), unspecified ADHD type; Other depression      ergocalciferol (VITAMIN D2) 50,000 unit Cap Take 50,000 Units by mouth every 7 days.      estradioL (ESTRACE) 1 MG tablet Take 1 tablet (1 mg total) by mouth once daily.  Qty: 30 tablet, Refills: 2      FLUoxetine 20 MG capsule Take 1 capsule (20 mg total) by mouth once daily.  Qty: 30 capsule, Refills: 1    Associated Diagnoses: Anxiety and depression      fluticasone propionate (FLONASE) 50 mcg/actuation nasal spray 2 sprays (100 mcg total) by Each Nostril route once daily  Qty: 16 g, Refills: 11    Associated Diagnoses: Allergic rhinitis, unspecified seasonality, unspecified trigger      gabapentin (NEURONTIN) 300 MG capsule Take 1 capsule (300 mg total) by mouth 3 (three) times daily.  Qty: 90 capsule, Refills: 1    Associated Diagnoses: Neuropathy      hydrOXYzine (ATARAX) 50 MG tablet Take 1 tablet (50 mg total) by mouth 3 (three) times daily as needed for Anxiety.  Qty: 90 tablet, Refills: 2    Associated Diagnoses: Anxiety and depression      levothyroxine (SYNTHROID) 125 MCG tablet Take 1 tablet (125 mcg total) by mouth before breakfast.  Qty: 30 tablet, Refills: 0    Associated Diagnoses: Hypothyroidism, unspecified type      lurasidone (LATUDA) 60 mg Tab tablet Take 1 tablet (60 mg total) by mouth once daily.  Qty: 30 tablet, Refills: 1    Associated Diagnoses: Anxiety and depression      olmesartan (BENICAR) 40 MG tablet Take 1 tablet (40 mg total) by mouth once daily.  Qty: 90 tablet, Refills: 3    Comments: .  Associated Diagnoses: Essential hypertension      temazepam (RESTORIL) 15 mg Cap Take 1 capsule (15 mg total) by mouth nightly as needed (insomnia).  Qty: 30 capsule, Refills: 0    Associated Diagnoses: Insomnia, unspecified type      tiZANidine (ZANAFLEX) 4 MG  tablet Take 1 tablet (4 mg total) by mouth every evening.  Qty: 30 tablet, Refills: 0    Associated Diagnoses: Rheumatoid arthritis involving both hands with positive rheumatoid factor      topiramate (TOPAMAX) 200 MG Tab Take 1 tablet (200 mg total) by mouth 2 (two) times daily.  Qty: 180 tablet, Refills: 1    Associated Diagnoses: Headache disorder      VYVANSE 70 mg capsule Take 1 capsule (70 mg total) by mouth every morning.  Qty: 30 capsule, Refills: 0    Associated Diagnoses: Attention deficit hyperactivity disorder (ADHD), unspecified ADHD type      azelastine 205.5 mcg (0.15 %) Spry 1 spray by Nasal route once daily.  Qty: 30 mL, Refills: 2    Associated Diagnoses: Allergic rhinitis, unspecified seasonality, unspecified trigger      sucralfate (CARAFATE) 1 gram tablet Take 1 tablet (1 g total) by mouth 4 (four) times daily as needed (indigestion/acid reflux).  Qty: 120 tablet, Refills: 3    Associated Diagnoses: Gastroesophageal reflux disease, unspecified whether esophagitis present      sumatriptan (IMITREX) 100 MG tablet Take 1 tablet (100 mg total) by mouth every 2 (two) hours as needed for Migraine. Do need exceed 2 tablets in 24 hours.      tamsulosin (FLOMAX) 0.4 mg Cap Take 1 capsule (0.4 mg total) by mouth once daily.  Qty: 30 capsule, Refills: 11    Associated Diagnoses: Flank pain      triamcinolone acetonide 0.1% (KENALOG) 0.1 % ointment Apply topically 2 (two) times daily. for 14 days  Qty: 80 g, Refills: 1    Associated Diagnoses: Rash      TRINTELLIX 20 mg Tab Take 1 tablet (20 mg total) by mouth every evening.  Qty: 30 tablet, Refills: 2    Associated Diagnoses: Anxiety and depression; Anxiety; Attention deficit hyperactivity disorder (ADHD), unspecified ADHD type; Other depression      valACYclovir (VALTREX) 1000 MG tablet Take 1 tablet (1,000 mg total) by mouth 2 (two) times daily. For 5 days with outbreaks.  Qty: 60 tablet, Refills: 2    Associated Diagnoses: Genital herpes simplex,  unspecified site           STOP taking these medications       dicyclomine (BENTYL) 20 mg tablet Comments:   Reason for Stopping:         tirzepatide (MOUNJARO) 15 mg/0.5 mL PnIj Comments:   Reason for Stopping:         diclofenac (VOLTAREN) 75 MG EC tablet Comments:   Reason for Stopping:         meclizine (ANTIVERT) 50 MG tablet Comments:   Reason for Stopping:         permethrin (ELIMITE) 5 % cream Comments:   Reason for Stopping:         traMADoL (ULTRAM) 50 mg tablet Comments:   Reason for Stopping:         zinc oxide 10 % Oint Comments:   Reason for Stopping:                 I have seen and examined this patient within the last 30 days. My clinical findings that support the need for the home health skilled services and home bound status are the following:  Weakness/numbness causing balance and gait disturbance due to Weakness/Debility making it taxing to leave home.  Requiring assistive device to leave home due to unsteady gait caused by  Weakness/Debility.     Diet:   Post gastrectomy diet    Referrals/ Consults  Physical Therapy to evaluate and treat. Evaluate for home safety and equipment needs; Establish/upgrade home exercise program. Perform / instruct on therapeutic exercises, gait training, transfer training, and Range of Motion.  Occupational Therapy to evaluate and treat. Evaluate home environment for safety and equipment needs. Perform/Instruct on transfers, ADL training, ROM, and therapeutic exercises.    Activities:   activity as tolerated    Nursing:   Agency to admit patient within 24 hours of hospital discharge unless specified on physician order or at patient request    SN to complete comprehensive assessment including routine vital signs. Instruct on disease process and s/s of complications to report to MD. Review/verify medication list sent home with the patient at time of discharge  and instruct patient/caregiver as needed. Frequency may be adjusted depending on start of care date.      Skilled nurse to perform up to 3 visits PRN for symptoms related to diagnosis    Notify MD if SBP > 160 or < 90; DBP > 90 or < 50; HR > 120 or < 50; Temp > 101; O2 < 88%    Ok to schedule additional visits based on staff availability and patient request on consecutive days within the home health episode.    Wound Care Orders  no    I certify that this patient is confined to her home and needs intermittent skilled nursing care, physical therapy, and occupational therapy.

## 2024-12-12 LAB
CHLORPROPAMIDE SERPL-MCNC: NEGATIVE UG/ML
GLIMEPIRIDE SERPL-MCNC: NEGATIVE NG/ML
GLIPIZIDE SERPL-MCNC: NEGATIVE UG/ML
GLYBURIDE SERPL-MCNC: NEGATIVE UG/ML
NATEGLINIDE SERPL QL: NEGATIVE
PIOGLITAZONE: NEGATIVE
REPAGLINIDE SERPL-MCNC: NEGATIVE NG/ML
ROSIGLITAZONE: NEGATIVE
TOLAZAMIDE SERPL-MCNC: NEGATIVE UG/ML
TOLBUTAMIDE SERPL-MCNC: NEGATIVE UG/ML
VIT B1 BLD-MCNC: 70 UG/L (ref 38–122)
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
## 2024-12-14 LAB
IGF-I SERPL-MCNC: 51 NG/ML (ref 40–217)
IGF-I Z-SCORE SERPL: -1.65 SD

## 2024-12-15 ENCOUNTER — NURSE TRIAGE (OUTPATIENT)
Dept: ADMINISTRATIVE | Facility: CLINIC | Age: 49
End: 2024-12-15
Payer: COMMERCIAL

## 2024-12-16 NOTE — TELEPHONE ENCOUNTER
Pt states she was recently discharged and was told to check bp pt states her stomach was hurting so she took her pain meds and states after eating her chest started to hurt so she checked her bp and it was 157/111. Pt reports she is still having chest pain. Per protocol instructed pt to call 911 now.   Reason for Disposition   [1] Chest pain lasts > 5 minutes AND [2] history of heart disease (i.e., heart attack, bypass surgery, angina, angioplasty, CHF)    Additional Information   Negative: Difficult to awaken or acting confused (e.g., disoriented, slurred speech)   Negative: SEVERE difficulty breathing (e.g., struggling for each breath, speaks in single words)   Negative: [1] Weakness of the face, arm or leg on one side of the body AND [2] new-onset   Negative: [1] Numbness (i.e., loss of sensation) of the face, arm or leg on one side of the body AND [2] new-onset    Protocols used: Blood Pressure - High-A-AH

## 2024-12-18 LAB
FINAL PATHOLOGIC DIAGNOSIS: NORMAL
GROSS: NORMAL
Lab: NORMAL
MICROSCOPIC EXAM: NORMAL

## 2024-12-20 NOTE — PROGRESS NOTES
Dear Ms. Petra:    It was my pleasure seeing you at the time of your recent EGD at [facility].    The biopsies from your stomach showed evidence of gastropathy which is injury to the lining of the stomach. This is most commonly caused by NSAID medications such as ibuprofen, alcohol, or tobacco products. I recommend avoiding these potential irritants. The biopsies were negative for h. Pylori.    Sincerely,    Hua Ibarra MD

## 2025-01-06 ENCOUNTER — PATIENT MESSAGE (OUTPATIENT)
Dept: ADMINISTRATIVE | Facility: OTHER | Age: 50
End: 2025-01-06
Payer: COMMERCIAL

## 2025-01-08 ENCOUNTER — OFFICE VISIT (OUTPATIENT)
Dept: PULMONOLOGY | Facility: CLINIC | Age: 50
End: 2025-01-08
Payer: COMMERCIAL

## 2025-01-08 DIAGNOSIS — R91.1 LUNG NODULE: ICD-10-CM

## 2025-01-08 DIAGNOSIS — J90 PLEURAL EFFUSION: ICD-10-CM

## 2025-01-08 DIAGNOSIS — J30.9 ALLERGIC RHINITIS, UNSPECIFIED SEASONALITY, UNSPECIFIED TRIGGER: ICD-10-CM

## 2025-01-08 DIAGNOSIS — J45.30 MILD PERSISTENT ASTHMA, UNCOMPLICATED: Primary | ICD-10-CM

## 2025-01-08 DIAGNOSIS — M05.9 RHEUMATOID ARTHRITIS, SEROPOSITIVE: ICD-10-CM

## 2025-01-08 PROBLEM — E11.9 TYPE 2 DIABETES MELLITUS: Status: RESOLVED | Noted: 2024-11-25 | Resolved: 2025-01-08

## 2025-01-08 NOTE — PROGRESS NOTES
Pulmonary Outpatient Follow Up Visit     Subjective:    The patient location is:  [facility] for abdominal pain  The chief complaint leading to consultation is: PLEURAL EFFUSION   Visit type: Virtual visit with synchronous audio and video  Total time spent with patient: 20 min  Each patient to whom he or she provides medical services by telemedicine is:  (1) informed of the relationship between the physician and patient and the respective role of any other health care provider with respect to management of the patient; and (2) notified that he or she may decline to receive medical services by telemedicine and may withdraw from such care at any time.  Total time spent in face to face counseling and coordination of care -  15minutes over 50% of time was used in discussion of prognosis, risks, benefits of treatment, instructions and compliance with regimen .      Patient ID: Tayla Lambert is a 49 y.o. female.    Chief Complaint: No chief complaint on file.      HPI          Patient is a 49 y.o. female presenting for evaluation of 4 mm right lung nodule that was detected on CT abdomen pelvis 11/23/2024 and was deemed to be stable when compared to prior CT abdomen pelvis 11/26/21.     History of asthma on albuterol p.r.n..  Coughing wheezing reported.       History of seropositive rheumatoid arthritis as per chart review.       Only on Neurontin for pain.            Review of Systems   Respiratory:  Positive for shortness of breath and use of rescue inhaler.    Cardiovascular:  Positive for chest pain.       Facility-Administered Encounter Medications as of 1/8/2025   Medication Dose Route Frequency Provider Last Rate Last Admin    albuterol inhaler 2 puff  2 puff Inhalation Q6H PRN Dhulappanavar Ariadna Ng MD        dextrose 50% injection 12.5 g  12.5 g Intravenous PRN DhulaAriadna Garcia MD        dextrose 50% injection 25 g  25 g Intravenous PRN Dhulappkishavar  Ariadna Ng MD   25 g at 01/07/25 0528    enoxaparin injection 40 mg  40 mg Subcutaneous Q24H (prophylaxis, 1700) Ariadna Ramsay MD   40 mg at 01/07/25 1832    glucagon (human recombinant) injection 1 mg  1 mg Intramuscular PRN Ariadna Ramsay MD        glucose chewable tablet 16 g  16 g Oral PRN Ariadna Ramsay MD   16 g at 01/08/25 0127    glucose chewable tablet 24 g  24 g Oral PRN Ariadna Ramsay MD        HYDROcodone-acetaminophen  mg per tablet 1 tablet  1 tablet Oral Q12H Vamshi Espinosa MD   1 tablet at 01/08/25 1335    HYDROmorphone injection 0.5 mg  0.5 mg Intravenous Q8H PRN Vamshi Espinosa MD        levothyroxine tablet 125 mcg  125 mcg Oral Before breakfast Ariadna Ramsay MD   125 mcg at 01/08/25 0526    naloxone 0.4 mg/mL injection 0.02 mg  0.02 mg Intravenous PRN Ariadna Ramsay MD        ondansetron injection 4 mg  4 mg Intravenous Q8H PRN Vamshi Espinosa MD   4 mg at 01/08/25 1335    polyethylene glycol packet 17 g  17 g Oral Daily Vamshi Espinosa MD   17 g at 01/08/25 1335    sodium chloride 0.9% flush 10 mL  10 mL Intravenous Q12H PRN Ariadna Ramsay MD        [DISCONTINUED] HYDROmorphone injection 0.5 mg  0.5 mg Intravenous Q4H PRN Ariadna Ramsay MD   0.5 mg at 01/08/25 0927     Outpatient Encounter Medications as of 1/8/2025   Medication Sig Dispense Refill    acetaminophen-codeine 300-30mg (TYLENOL #3) 300-30 mg Tab Take 1 tablet by mouth 2 (two) times daily. (Patient not taking: Reported on 1/7/2025) 40 tablet 0    albuterol (PROVENTIL/VENTOLIN HFA) 90 mcg/actuation inhaler Inhale 2 puffs into the lungs every 6 (six) hours as needed for Wheezing. Rescue 18 g 5    azelastine 205.5 mcg (0.15 %) Spry 1 spray by Nasal route once daily. (Patient not taking: Reported on 1/7/2025) 30 mL 2    b complex vitamins (B COMPLEX-VITAMIN B12) tablet Take 1 tablet by  mouth once daily.      cariprazine (VRAYLAR) 1.5 mg Cap Take 1 capsule (1.5 mg total) by mouth every evening. 30 capsule 1    cephALEXin (KEFLEX) 500 MG capsule Take 500 mg by mouth every 12 (twelve) hours.      cetirizine (ZYRTEC) 10 MG tablet Take 1 tablet (10 mg total) by mouth once daily. 30 tablet 2    cyanocobalamin 1,000 mcg/mL injection Inject 1 mL (1,000 mcg total) into the muscle every 30 days. Inject 1 mL into the muscle every month 3 mL 3    divalproex ER (DEPAKOTE ER) 250 MG 24 hr tablet TAKE 1 TABLET(250 MG) BY MOUTH DAILY 30 tablet 0    ergocalciferol (VITAMIN D2) 50,000 unit Cap Take 50,000 Units by mouth every 7 days.      estradioL (ESTRACE) 1 MG tablet Take 1 tablet (1 mg total) by mouth once daily. 30 tablet 2    FLUoxetine 20 MG capsule Take 1 capsule (20 mg total) by mouth once daily. 30 capsule 1    fluticasone propionate (FLONASE) 50 mcg/actuation nasal spray 2 sprays (100 mcg total) by Each Nostril route once daily 16 g 11    gabapentin (NEURONTIN) 300 MG capsule Take 1 capsule (300 mg total) by mouth 3 (three) times daily. 90 capsule 1    hydrOXYzine (ATARAX) 50 MG tablet TAKE 1 TABLET(50 MG) BY MOUTH THREE TIMES DAILY AS NEEDED FOR ANXIETY 90 tablet 2    levothyroxine (SYNTHROID) 125 MCG tablet Take 1 tablet (125 mcg total) by mouth before breakfast. 30 tablet 0    lurasidone (LATUDA) 60 mg Tab tablet Take 1 tablet (60 mg total) by mouth once daily. 30 tablet 1    olmesartan (BENICAR) 40 MG tablet Take 1 tablet (40 mg total) by mouth once daily. 90 tablet 3    pantoprazole (PROTONIX) 40 MG tablet Take 1 tablet (40 mg total) by mouth 2 (two) times daily for 56 days, THEN 1 tablet (40 mg total) once daily. 30 tablet 2    sucralfate (CARAFATE) 1 gram tablet Take 1 tablet (1 g total) by mouth 4 (four) times daily as needed (indigestion/acid reflux). (Patient not taking: Reported on 1/7/2025) 120 tablet 3    sumatriptan (IMITREX) 100 MG tablet Take 1 tablet (100 mg total) by mouth every 2 (two)  hours as needed for Migraine. Do need exceed 2 tablets in 24 hours. (Patient not taking: Reported on 1/7/2025)      tamsulosin (FLOMAX) 0.4 mg Cap Take 1 capsule (0.4 mg total) by mouth once daily. 30 capsule 11    temazepam (RESTORIL) 15 mg Cap Take 1 capsule (15 mg total) by mouth nightly as needed (insomnia). 30 capsule 0    tiZANidine (ZANAFLEX) 4 MG tablet Take 1 tablet (4 mg total) by mouth every evening. 30 tablet 0    topiramate (TOPAMAX) 200 MG Tab Take 1 tablet (200 mg total) by mouth 2 (two) times daily. 180 tablet 1    triamcinolone acetonide 0.1% (KENALOG) 0.1 % ointment Apply topically 2 (two) times daily. for 14 days (Patient not taking: Reported on 1/7/2025) 80 g 1    TRINTELLIX 20 mg Tab Take 1 tablet (20 mg total) by mouth every evening. 30 tablet 2    valACYclovir (VALTREX) 1000 MG tablet Take 1 tablet (1,000 mg total) by mouth 2 (two) times daily. For 5 days with outbreaks. 60 tablet 2    VYVANSE 70 mg capsule Take 1 capsule (70 mg total) by mouth every morning. 30 capsule 0       Objective:     Vital Signs (Most Recent)   ]  Wt Readings from Last 2 Encounters:   01/07/25 72.6 kg (160 lb)   12/08/24 72.9 kg (160 lb 11.5 oz)       Physical Exam   Constitutional: She is oriented to person, place, and time. She appears well-developed and well-nourished.   Pulmonary/Chest: No respiratory distress.   Neurological: She is alert and oriented to person, place, and time.   Psychiatric: Her behavior is normal.       Laboratory  Lab Results   Component Value Date    WBC 3.46 (L) 01/08/2025    RBC 4.05 01/08/2025    HGB 13.0 01/08/2025    HCT 39.9 01/08/2025    MCV 99 (H) 01/08/2025    MCH 32.1 01/08/2025    MCHC 32.6 01/08/2025    RDW 13.2 01/08/2025     01/08/2025    MPV 10.1 01/08/2025    GRAN 2.9 12/09/2024    GRAN 61.1 12/09/2024    LYMPH 30.3 01/08/2025    LYMPH 1.05 01/08/2025    MONO 7.2 01/08/2025    MONO 0.25 (L) 01/08/2025    EOS 7.8 01/08/2025    EOS 0.27 01/08/2025    BASO 0.04 12/09/2024  "   EOSINOPHIL 3.4 12/09/2024    BASOPHIL 0.9 01/08/2025    BASOPHIL 0.03 01/08/2025       BMP  Lab Results   Component Value Date     01/08/2025    K 3.9 01/08/2025     (H) 01/08/2025    CO2 22 (L) 01/08/2025    BUN 12 01/08/2025    CREATININE 0.7 01/08/2025    CALCIUM 9.4 01/08/2025    ANIONGAP 7 (L) 01/08/2025    ESTGFRAFRICA >60.0 01/05/2022    EGFRNONAA >60.0 01/05/2022     (H) 01/08/2025     (H) 01/08/2025    PROT 5.4 (L) 12/11/2024       Lab Results   Component Value Date    BNP 35 01/06/2025       Lab Results   Component Value Date    TSH 0.977 12/07/2024       Lab Results   Component Value Date    SEDRATE 10 11/24/2024       No results found for: "CRP"  Lab Results   Component Value Date    IGE <35 12/03/2024        No results found for: "ASPERGILLUS"  No results found for: "AFUMIGATUSCL"     No results found for: "ACE"     Diagnostic Results:  I have personally reviewed today the following studies:      CT abdomen and pelvis November 2024   EXAMINATION:  CT ABDOMEN PELVIS WITH IV CONTRAST     CLINICAL HISTORY:  Nausea/vomiting;Abdominal pain, acute, nonlocalized;Hx of gastric bypass with recurrenat nausea and vomiting;     TECHNIQUE:  Images from the lung bases to the ischial tuberosities were acquired after administration of 100cc of non-ionic iodine IV contrast material. Sagittal and Coronal multiplanar reconstructions (MPR) were performed and pushed to PACS.     Suboptimal assessment of the GI tract due to lack of p.o. contrast material and luminal distension.     COMPARISON:  Ultrasound abdomen dated 11/08/2024     CT abdomen and pelvis dated 11/26/2021     FINDINGS:  Lower chest: No evidence of pleural effusion or pneumothorax.  Stable right lower lobe subpleural nodule measuring approximately 3 mm (series 2, imaging 32).     Liver: Normal.     Gallbladder and biliary tree: Cholecystectomy.No intrahepatic biliary ductal dilation.  CBD measuring 1.3 cm.     Spleen: A linear " lucent line crossing the spleen (series 2, image 38; series 601, image 83).     Pancreas: Normal.     Adrenals: Normal.     Kidneys and ureters: No evidence of nephrolithiasis or hydronephrosis.     Bowel: Redemonstration of several surgical clips and suture material.  No evidence of mechanical bowel obstruction.  Moderate stool within the colon.  Appendix is not distended.     Vessels: No significant atheromatous disease is noted in the aorta and its major collateral branches.     Lymph nodes: No mesenteric, retroperitoneal or pelvic lymphadenopathy is noted.     Peritoneum: No ascites or free air.     Abdominal wall: Prior midline incision.  No evidence of bowel herniation.  Body wall edema.     Reproductive organs: Hysterectomy.  No pelvic masses.     Bladder: Distended with no evidence of intraluminal stones or masses.     Bones: Known L1 and L2 laminectomies with placement of bilateral pedicle screws and rods at T12-L2 across a remote L1 burst fracture; Hardware is intact.  Multilevel vacuum phenomena.  Dextroscoliosis of the lumbar spine.        Impression:     1. A linear lucent line crossing the spleen could be congenital, versus remote infarct or trauma.  Please correlate with physical examination.  2. Otherwise, no CT evidence of acute abdominal abnormality is seen.  3. Known postoperative changes within the abdomen and pelvis.  No evidence of mechanical bowel obstruction.  4. Anasarca.  5. Constipation.  6. Additional findings above.      MRCP 12/10/2024    COMPARISON:  CT 12/06/2024     FINDINGS:  There is a small dependent right-sided pleural effusion and a trace left-sided pleural effusion.  The signal characteristics and enhancement patterns of the liver, spleen, pancreas, kidneys and adrenal glands is unremarkable.     The retroperitoneal vessels enhance normally.     Miscellaneous: Surgical changes to the upper GI tract suggesting partial gastrectomy and gastrojejunostomy.     MRCP: The intra and  extrahepatic biliary radicles appear normal.  No filling defects are seen within the common bile duct. The gallbladder is absent.     Impression:     Normal MRCP and MRI of the liver.     Small right and trace left pleural effusion  Assessment/Plan:   Mild persistent asthma, uncomplicated    Rheumatoid arthritis, seropositive    Allergic rhinitis, unspecified seasonality, unspecified trigger    Lung nodule    Pleural effusion        Continue albuterol p.r.n..    Check PFT.  Check IgE fraction  nitric oxide.      Continue steroid and antihistamine nasal spray.          Pleural effusion present on MRCP 2024 absent on recent CT scan of the abdomen 2025.      Lung nodule 4 mm stable since  low risk patient no history of cancer and never smoker at the moment no need for surveillance.   No follow-ups on file.    This note was prepared using voice recognition system and is likely to have sound alike errors that may have been overlooked even after proof reading.  Please call me with any questions    Discussed diagnosis, its evaluation, treatment and usual course. All questions answered.      Casper Sanchez MD

## 2025-01-09 ENCOUNTER — PATIENT MESSAGE (OUTPATIENT)
Dept: ADMINISTRATIVE | Facility: HOSPITAL | Age: 50
End: 2025-01-09
Payer: COMMERCIAL

## 2025-01-10 PROBLEM — K92.1 BLOOD IN STOOL: Status: ACTIVE | Noted: 2025-01-10

## 2025-01-24 ENCOUNTER — TELEPHONE (OUTPATIENT)
Dept: GASTROENTEROLOGY | Facility: CLINIC | Age: 50
End: 2025-01-24
Payer: COMMERCIAL

## 2025-01-24 NOTE — TELEPHONE ENCOUNTER
Called pt to verify if she will be coming in to clinic today for her 3PM visit, pt asks for virtual visit instead but was informed that unfortunately at this time [facility] does not offer virtual visits. Pt states she was seen in [place] for abd pain at the ER so she will contact the [facility] instead

## 2025-02-10 ENCOUNTER — TELEPHONE (OUTPATIENT)
Dept: PHYSICAL MEDICINE AND REHAB | Facility: CLINIC | Age: 50
End: 2025-02-10
Payer: COMMERCIAL

## 2025-02-10 NOTE — TELEPHONE ENCOUNTER
Pt is asking to be seen virtually because she lives 5 hours away. She was also asking about a closer provider. I recommended that she call [facility] in [place]. She verbalized understanding. Any other suggestions?

## 2025-02-10 NOTE — TELEPHONE ENCOUNTER
----- Message from Kelli sent at 2/10/2025 11:25 AM CST -----  Regarding: Reschedule as virtual  Contact: pt  Type:  Reschedule Appointment Request    Caller is requesting to reschedule appointment.      Name of Caller:pt    When is the first available appointment? 2/13    Symptoms: cronic pain    Would the patient rather a call back or a response via [facility]? Call back    Best Call Back Number:[phone]    Additional Information:   requesting to change appt to be virtual.  Pt a 5 hour drive from office.  Is there another doctor that's closer to pt?  She is near [place].

## 2025-02-17 ENCOUNTER — PATIENT MESSAGE (OUTPATIENT)
Dept: PULMONOLOGY | Facility: CLINIC | Age: 50
End: 2025-02-17
Payer: MEDICAID

## 2025-03-03 ENCOUNTER — OFFICE VISIT (OUTPATIENT)
Dept: HEPATOLOGY | Facility: CLINIC | Age: 50
End: 2025-03-03
Payer: COMMERCIAL

## 2025-03-03 DIAGNOSIS — R10.11 RIGHT UPPER QUADRANT ABDOMINAL PAIN: Primary | ICD-10-CM
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
## 2025-03-10 ENCOUNTER — TELEPHONE (OUTPATIENT)
Dept: HEMATOLOGY/ONCOLOGY | Facility: CLINIC | Age: 50
End: 2025-03-10
Payer: COMMERCIAL

## 2025-03-10 NOTE — TELEPHONE ENCOUNTER
----- Message from Krystal Daigle PA-C sent at 3/10/2025  9:33 AM CDT -----  [place] is [place] far. Let's offer a virtual visit. Krystal  ----- Message -----  From: Nila Nelson  Sent: 3/10/2025   9:29 AM CDT  To: [facility] Staff    Appointment RequestName of Caller:PtSymptoms or reason for appointment:[facility] Call Back Number:[phone] has an appt scheduled 3/13 but would like to know if it can be rescheduled to either Tuesday afternoon or Wednesday. Pt is driving in from [place], [place] (8 hours) and is trying to limit her hotel room stays. Pt states to please keep the appt if no earlier is available.

## 2025-03-11 ENCOUNTER — TELEPHONE (OUTPATIENT)
Dept: NEUROLOGY | Facility: CLINIC | Age: 50
End: 2025-03-11
Payer: COMMERCIAL

## 2025-03-11 NOTE — TELEPHONE ENCOUNTER
I called the patient regarding her late arrival to [facility] appointment and informed her that the lashay period for late arrivals is 15 minutes due to the specialty and complexity of the appointment. I apologized, but explained that Ms. Mcadams would not be able to see her if she arrived 30 minutes late. The patient then put her  on the phone, and he explained the distance they were traveling. I told him I would check with the providers. Ms. Mcadams later confirmed that a reschedule would be necessary due to the nature of the memory appointment.  I returned to speak with the patients  and explained the situation, but he became upset, questioning how it could be possible that the appointment couldnt proceed. He expressed frustration about needing to reschedule and called it ridiculous. I tried to explain the reasoning for the reschedule, but he remained upset and ended the call.

## 2025-03-12 NOTE — PROGRESS NOTES
Hematology/Oncology Virtual New Patient Note    Patient ID: Tayla Lambert is a 49 y.o. female.    Chief Complaint: Iron deficiency     The patient location is: [place]    Visit type: audiovisual    Face to Face time with patient: 20  45 minutes of total time spent on the encounter, which includes face to face time and non-face to face time preparing to see the patient (eg, review of tests), Obtaining and/or reviewing separately obtained history, Documenting clinical information in the electronic or other health record, Independently interpreting results (not separately reported) and communicating results to the patient/family/caregiver, or Care coordination (not separately reported).     Each patient to whom he or she provides medical services by telemedicine is:  (1) informed of the relationship between the physician and patient and the respective role of any other health care provider with respect to management of the patient; and (2) notified that he or she may decline to receive medical services by telemedicine and may withdraw from such care at any time.    History     Ms. Lambert is a 49 y.o. female self referred to hematology for the evaluation and management of low iron saturation. Other medical diagnoses include migraines, anxiety, depression, ADHD, HTN, RA, hypothyroidism, vitamin b12 deficiency and vitamin D deficiencies, and chronic low back pain. She also has a h/o jamar-en-Y gastrojejunostomy.     Patient is currently in a waiting room in [place] for Rheumatology appointment. She has been having a difficult time with walking and falling often with dizziness. Using a cane recently. Reports weakness and discoloration in her extremities which is all new for her. She was seen in the ED for dizziness and fatigue on 3/5/25. Her labs at this time showed normal vitamin b12, normal ferritin, low iron saturation, elevated H&H, elevated Cr and eGFR of 42. Her symptoms were attributed to her chronic back and  abdominal pain. She follows with GI for abdominal pain and has recently had an increase in that pain, so was also seen by hepatology (had elevated LFTs that resolved). Due to her ongoing abdominal pain symptoms she is scheduled for repeat EGD and colonoscopy 3/21/25.     Of note, she has multiple specialty appointments scheduled for her new symptoms including neurosurgery and rheumatology     Fam hx: father and paternal grandfather prostate cancer; mother and maternal grandmother and great grandmother with breast cancer;     Menstrual Hx: s/p full hysterectomy at age 40 due to heavy menstrual cycles; had 3 children (one )     Social: nonsmoker; does not drink alcohol     Mammogram: 2024 negative     EGD/Colonoscopy: 2024 - healthy tissue; 10/2024 no abnormalities        Review of patient's allergies indicates:  No Known Allergies      Past medical, surgical, and medication history, past family history reviewed and updated with patient today as noted.      Past Medical History:   Diagnosis Date    ADHD (attention deficit hyperactivity disorder)     Anxiety     Asthma     Depression     Diabetes mellitus     Encounter for blood transfusion     Essential hypertension 2022    Hypothyroidism 2020    Kidney stone 2022    Migraine without status migrainosus, not intractable 2020    Rheumatoid arthritis involving both hands with positive rheumatoid factor 2018    T12 burst fracture     Tremors of nervous system 2020    Type 2 diabetes mellitus 2024       Past Surgical History:   Procedure Laterality Date    ABDOMINAL SURGERY      APPENDECTOMY      BACK SURGERY  2018    T11 to L1 PSF w/T12 laminectomy     SECTION      CHOLECYSTECTOMY      ESOPHAGOGASTRODUODENOSCOPY N/A 12/10/2024    Procedure: EGD (ESOPHAGOGASTRODUODENOSCOPY);  Surgeon: Hua Ibarra MD;  Location: [facility] ([address] [place] [address]);  Service: Gastroenterology;  Laterality: N/A;    GASTRIC BYPASS       HYSTERECTOMY      TONSILLECTOMY         Review of Systems:  Review of Systems   Constitutional:  Positive for malaise/fatigue and weight loss (reports 60-70 lb weight loss since August/September).        Decrease in appetite due to abdominal pain   HENT:  Negative for congestion and nosebleeds.    Respiratory:  Negative for cough and shortness of breath.    Cardiovascular:  Positive for chest pain (stabbing pain that comes and go). Negative for leg swelling.   Gastrointestinal:  Positive for abdominal pain and diarrhea. Negative for blood in stool and vomiting.   Genitourinary:  Negative for hematuria.   Musculoskeletal:  Positive for myalgias.        Reports bone pain and muscular pain   Skin:  Negative for rash.        Reports discoloration (purple) lower extremities   Neurological:  Positive for dizziness and tingling (in legs).        Reports contractions in her hands that are unable to come out of with shakiness          Physical Exam     No vitals or physical exam due to virtual visit     Labs:  No visits with results within 2 Day(s) from this visit.   Latest known visit with results is:   Admission on 03/05/2025, Discharged on 03/05/2025   Component Date Value Ref Range Status    Color, UA 03/05/2025 Yellow  Straw, Lizzy, Yellow, Light-Orange Final    Appearance, UA 03/05/2025 Clear  Clear Final    pH, UA 03/05/2025 6.0  5.0 - 8.0 Final    Spec Grav UA 03/05/2025 1.010  1.005 - 1.030 Final    Protein, UA 03/05/2025 Negative  Negative Final    Recommend a 24 hour urine protein or a urine protein/creatinine ratio if globulin induced proteinuria is clinically suspected.    Glucose, UA 03/05/2025 Negative  Negative Final    Ketones, UA 03/05/2025 Negative  Negative Final    Bilirubin, UA 03/05/2025 Negative  Negative Final    Blood, UA 03/05/2025 Negative  Negative Final    Nitrites, UA 03/05/2025 Negative  Negative Final    Urobilinogen, UA 03/05/2025 Negative  <2.0 EU/dL Final    Leukocyte Esterase, UA  03/05/2025 Trace (A)  Negative Final    WBC, UA 03/05/2025 2  0 - 5 /HPF Final    Bacteria, UA 03/05/2025 Rare  None, Rare, Occasional /HPF Final    Microscopic Comment 03/05/2025    Final    Other formed elements not mentioned in the report are not present in the microscopic examination.        PROCEDURES/IMAGING  XR Ribs Min 3 Views w/PA Chest Right  Narrative: EXAMINATION:  XR RIBS MIN 3 VIEWS W/ PA CHEST RIGHT    CLINICAL HISTORY:  rib pain;    TECHNIQUE:  AP chest and three views of the right ribs.    COMPARISON:  None    FINDINGS:  Heart is not enlarged.  Lung fields are clear.    No evidence of rib fracture.  Impression: Unremarkable rib series.    Electronically signed by: Robi Martin  Date:    03/05/2025  Time:    22:20         Discussion     Problem List:  Problem List Items Addressed This Visit       Hypothyroidism    Fatigue - Primary    Overview   -patient admitted for generalized weakness has been going on for 3 days.  -will start patient on IV fluids and consult PT and OT for evaluation.  -possibly secondary to polypharmacy.  Will decrease amount of sedating medication patient receives.         Relevant Orders    Folate    SPEP - Protein electrophoresis, serum    FEI    FREE LT CHAIN ANAL    IMMUNOGLOBULINS (IGG, IGA, IGM) QUANTITATIVE    Copper, Serum    Zinc    CBC Auto Differential    CMP    Dizziness    Relevant Orders    Folate    SPEP - Protein electrophoresis, serum    FEI    FREE LT CHAIN ANAL    IMMUNOGLOBULINS (IGG, IGA, IGM) QUANTITATIVE    Copper, Serum    Zinc    CBC Auto Differential    CMP    History of gastric bypass    Vitamin B12 deficiency (non anemic)    Vitamin D deficiency     Other Visit Diagnoses         Neuropathic pain        Relevant Orders    Folate    SPEP - Protein electrophoresis, serum    FEI    FREE LT CHAIN ANAL    IMMUNOGLOBULINS (IGG, IGA, IGM) QUANTITATIVE    Copper, Serum    Zinc    CBC Auto Differential    CMP      Generalized abdominal pain               Dizziness/Fatigue/neuropathic pain with h/o vitamin B12 and vitamin D deficiencies  - Her constellation of symptoms can have many etiologies, but etiology is unclear at this time  - Her most recent vitamin b12 level was wnl   - Will check folate, copper, and zinc levels   - Given her neuropathic pain and weakness/fatigue, can also check SPEP, EFI, FLC, and immunoglobulins with repeat CBC  - Her last H&H was elevated, but this was during ED visit and likely due to dehydration.   - RTC in 2 weeks virtually to review lab results     Abdominal pain with diarrhea  - Longstanding history of abdominal pain   - Had several ED visits for this in the past few months  - She is s/p gastric bypass surgery and had recent EGD and colonoscopy in 2024 wnl.    - Seeing GI who will repeat EGD and colonoscopy due to ongoing symptoms on 3/21/25   - Also seen by hepatology for elevated LFTs and pain (potential cause was stone passing). LFTs now normalized  - Repeat CMP today  - Has had several imaging scans of her abdomen/pelvis, most recent CT A/P without abnormality. Scheduled for NM gastric emptying in the next week    Hypothyroidism    - TSH and FT4 checked on 3/5/25 was normal   - Not currently on medication  - Continue management per PCP    Krystal Daigle PA-C  3/12/2025 9:24 AM   Hematology/Oncology  [facility] - [address], [address]  [place] [address]  Phone: [phone]  Fax: [phone]

## 2025-03-13 ENCOUNTER — HOSPITAL ENCOUNTER (INPATIENT)
Facility: HOSPITAL | Age: 50
LOS: 2 days | Discharge: HOME HEALTH | DRG: 293 | End: 2025-03-16
Attending: STUDENT IN AN ORGANIZED HEALTH CARE EDUCATION/TRAINING PROGRAM | Admitting: HOSPITALIST
Payer: COMMERCIAL

## 2025-03-13 ENCOUNTER — OFFICE VISIT (OUTPATIENT)
Dept: HEMATOLOGY/ONCOLOGY | Facility: CLINIC | Age: 50
End: 2025-03-13
Payer: COMMERCIAL

## 2025-03-13 DIAGNOSIS — E55.9 VITAMIN D DEFICIENCY: ICD-10-CM

## 2025-03-13 DIAGNOSIS — R07.9 CHEST PAIN: ICD-10-CM

## 2025-03-13 DIAGNOSIS — M54.9 BACK PAIN, UNSPECIFIED BACK LOCATION, UNSPECIFIED BACK PAIN LATERALITY, UNSPECIFIED CHRONICITY: ICD-10-CM

## 2025-03-13 DIAGNOSIS — E03.9 HYPOTHYROIDISM, UNSPECIFIED TYPE: ICD-10-CM

## 2025-03-13 DIAGNOSIS — G89.29 CHRONIC LOW BACK PAIN WITHOUT SCIATICA, UNSPECIFIED BACK PAIN LATERALITY: ICD-10-CM

## 2025-03-13 DIAGNOSIS — E53.8 VITAMIN B12 DEFICIENCY (NON ANEMIC): ICD-10-CM

## 2025-03-13 DIAGNOSIS — R53.83 OTHER FATIGUE: Primary | ICD-10-CM

## 2025-03-13 DIAGNOSIS — M54.50 CHRONIC LOW BACK PAIN WITHOUT SCIATICA, UNSPECIFIED BACK PAIN LATERALITY: ICD-10-CM

## 2025-03-13 DIAGNOSIS — Z98.84 HISTORY OF GASTRIC BYPASS: ICD-10-CM

## 2025-03-13 DIAGNOSIS — R42 DIZZINESS: ICD-10-CM

## 2025-03-13 DIAGNOSIS — I95.9 HYPOTENSION: ICD-10-CM

## 2025-03-13 DIAGNOSIS — R10.84 GENERALIZED ABDOMINAL PAIN: ICD-10-CM

## 2025-03-13 DIAGNOSIS — R53.1 WEAKNESS: Primary | ICD-10-CM

## 2025-03-13 DIAGNOSIS — M79.2 NEUROPATHIC PAIN: ICD-10-CM

## 2025-03-13 DIAGNOSIS — S22.081A T12 BURST FRACTURE: ICD-10-CM

## 2025-03-13 LAB
ACCEPTIBLE SP GR UR QL: 1.01 (ref 1–1.03)
ALBUMIN SERPL-MCNC: 4.1 G/DL (ref 3.5–5)
ALBUMIN/GLOB SERPL: 1.3 RATIO (ref 1.1–2)
ALP SERPL-CCNC: 121 UNIT/L (ref 40–150)
ALT SERPL-CCNC: 44 UNIT/L (ref 0–55)
AMPHET UR QL SCN: POSITIVE
ANION GAP SERPL CALC-SCNC: 11 MEQ/L
AST SERPL-CCNC: 30 UNIT/L (ref 5–34)
BACTERIA #/AREA URNS AUTO: NORMAL /HPF
BARBITURATE SCN PRESENT UR: NEGATIVE
BASOPHILS # BLD AUTO: 0.05 X10(3)/MCL
BASOPHILS NFR BLD AUTO: 1.1 %
BENZODIAZ UR QL SCN: NEGATIVE
BILIRUB SERPL-MCNC: 0.3 MG/DL
BILIRUB UR QL STRIP.AUTO: NEGATIVE
BUN SERPL-MCNC: 15 MG/DL (ref 9.8–20.1)
CALCIUM SERPL-MCNC: 9.6 MG/DL (ref 8.4–10.2)
CANNABINOIDS UR QL SCN: NEGATIVE
CHLORIDE SERPL-SCNC: 105 MMOL/L (ref 98–107)
CLARITY UR: CLEAR
CO2 SERPL-SCNC: 23 MMOL/L (ref 22–29)
COCAINE UR QL SCN: NEGATIVE
COLOR UR AUTO: NORMAL
CREAT SERPL-MCNC: 0.86 MG/DL (ref 0.55–1.02)
CREAT/UREA NIT SERPL: 17
EOSINOPHIL # BLD AUTO: 0.13 X10(3)/MCL (ref 0–0.9)
EOSINOPHIL NFR BLD AUTO: 2.9 %
ERYTHROCYTE [DISTWIDTH] IN BLOOD BY AUTOMATED COUNT: 12.9 % (ref 11.5–17)
ETHANOL SERPL-MCNC: <10 MG/DL
FENTANYL UR QL SCN: NEGATIVE
GFR SERPLBLD CREATININE-BSD FMLA CKD-EPI: >60 ML/MIN/1.73/M2
GLOBULIN SER-MCNC: 3.1 GM/DL (ref 2.4–3.5)
GLUCOSE SERPL-MCNC: 78 MG/DL (ref 74–100)
GLUCOSE UR QL STRIP: NORMAL
HCT VFR BLD AUTO: 42.2 % (ref 37–47)
HGB BLD-MCNC: 14.4 G/DL (ref 12–16)
HGB UR QL STRIP: NEGATIVE
IMM GRANULOCYTES # BLD AUTO: 0.01 X10(3)/MCL (ref 0–0.04)
IMM GRANULOCYTES NFR BLD AUTO: 0.2 %
KETONES UR QL STRIP: NEGATIVE
LEUKOCYTE ESTERASE UR QL STRIP: NEGATIVE
LIPASE SERPL-CCNC: 16 U/L
LYMPHOCYTES # BLD AUTO: 1.46 X10(3)/MCL (ref 0.6–4.6)
LYMPHOCYTES NFR BLD AUTO: 32.7 %
MAGNESIUM SERPL-MCNC: 2.2 MG/DL (ref 1.6–2.6)
MCH RBC QN AUTO: 30.8 PG (ref 27–31)
MCHC RBC AUTO-ENTMCNC: 34.1 G/DL (ref 33–36)
MCV RBC AUTO: 90.4 FL (ref 80–94)
MDMA UR QL SCN: NEGATIVE
MONOCYTES # BLD AUTO: 0.39 X10(3)/MCL (ref 0.1–1.3)
MONOCYTES NFR BLD AUTO: 8.7 %
NEUTROPHILS # BLD AUTO: 2.43 X10(3)/MCL (ref 2.1–9.2)
NEUTROPHILS NFR BLD AUTO: 54.4 %
NITRITE UR QL STRIP: NEGATIVE
NRBC BLD AUTO-RTO: 0 %
OHS QRS DURATION: 98 MS
OHS QTC CALCULATION: 447 MS
OPIATES UR QL SCN: NEGATIVE
PCP UR QL: NEGATIVE
PH UR STRIP: 6 [PH]
PH UR: 6 [PH] (ref 3–11)
PLATELET # BLD AUTO: 328 X10(3)/MCL (ref 130–400)
PMV BLD AUTO: 10 FL (ref 7.4–10.4)
POCT GLUCOSE: 77 MG/DL (ref 70–110)
POCT GLUCOSE: 80 MG/DL (ref 70–110)
POTASSIUM SERPL-SCNC: 3.1 MMOL/L (ref 3.5–5.1)
PROT SERPL-MCNC: 7.2 GM/DL (ref 6.4–8.3)
PROT UR QL STRIP: NEGATIVE
RBC # BLD AUTO: 4.67 X10(6)/MCL (ref 4.2–5.4)
RBC #/AREA URNS AUTO: NORMAL /HPF
SODIUM SERPL-SCNC: 139 MMOL/L (ref 136–145)
SP GR UR STRIP.AUTO: 1.01 (ref 1–1.03)
SQUAMOUS #/AREA URNS LPF: NORMAL /HPF
TROPONIN I SERPL-MCNC: <0.01 NG/ML (ref 0–0.04)
UROBILINOGEN UR STRIP-ACNC: NORMAL
WBC # BLD AUTO: 4.47 X10(3)/MCL (ref 4.5–11.5)
WBC #/AREA URNS AUTO: NORMAL /HPF

## 2025-03-13 PROCEDURE — 82077 ASSAY SPEC XCP UR&BREATH IA: CPT | Performed by: STUDENT IN AN ORGANIZED HEALTH CARE EDUCATION/TRAINING PROGRAM

## 2025-03-13 PROCEDURE — 63600175 PHARM REV CODE 636 W HCPCS: Performed by: STUDENT IN AN ORGANIZED HEALTH CARE EDUCATION/TRAINING PROGRAM

## 2025-03-13 PROCEDURE — 83550 IRON BINDING TEST: CPT

## 2025-03-13 PROCEDURE — 80053 COMPREHEN METABOLIC PANEL: CPT | Performed by: PHYSICIAN ASSISTANT

## 2025-03-13 PROCEDURE — 82962 GLUCOSE BLOOD TEST: CPT

## 2025-03-13 PROCEDURE — 84484 ASSAY OF TROPONIN QUANT: CPT | Performed by: PHYSICIAN ASSISTANT

## 2025-03-13 PROCEDURE — 99291 CRITICAL CARE FIRST HOUR: CPT

## 2025-03-13 PROCEDURE — 83690 ASSAY OF LIPASE: CPT | Performed by: STUDENT IN AN ORGANIZED HEALTH CARE EDUCATION/TRAINING PROGRAM

## 2025-03-13 PROCEDURE — 25500020 PHARM REV CODE 255: Performed by: STUDENT IN AN ORGANIZED HEALTH CARE EDUCATION/TRAINING PROGRAM

## 2025-03-13 PROCEDURE — 82728 ASSAY OF FERRITIN: CPT

## 2025-03-13 PROCEDURE — 96361 HYDRATE IV INFUSION ADD-ON: CPT

## 2025-03-13 PROCEDURE — 83735 ASSAY OF MAGNESIUM: CPT | Performed by: PHYSICIAN ASSISTANT

## 2025-03-13 PROCEDURE — 25000003 PHARM REV CODE 250: Performed by: STUDENT IN AN ORGANIZED HEALTH CARE EDUCATION/TRAINING PROGRAM

## 2025-03-13 PROCEDURE — 63600175 PHARM REV CODE 636 W HCPCS: Performed by: EMERGENCY MEDICINE

## 2025-03-13 PROCEDURE — 84443 ASSAY THYROID STIM HORMONE: CPT

## 2025-03-13 PROCEDURE — 96375 TX/PRO/DX INJ NEW DRUG ADDON: CPT

## 2025-03-13 PROCEDURE — 93010 ELECTROCARDIOGRAM REPORT: CPT | Mod: ,,, | Performed by: INTERNAL MEDICINE

## 2025-03-13 PROCEDURE — 81001 URINALYSIS AUTO W/SCOPE: CPT | Mod: XB | Performed by: PHYSICIAN ASSISTANT

## 2025-03-13 PROCEDURE — 80307 DRUG TEST PRSMV CHEM ANLYZR: CPT | Performed by: STUDENT IN AN ORGANIZED HEALTH CARE EDUCATION/TRAINING PROGRAM

## 2025-03-13 PROCEDURE — 85025 COMPLETE CBC W/AUTO DIFF WBC: CPT | Performed by: PHYSICIAN ASSISTANT

## 2025-03-13 PROCEDURE — 25000003 PHARM REV CODE 250: Performed by: PHYSICIAN ASSISTANT

## 2025-03-13 PROCEDURE — 93005 ELECTROCARDIOGRAM TRACING: CPT

## 2025-03-13 RX ORDER — SODIUM CHLORIDE, SODIUM LACTATE, POTASSIUM CHLORIDE, CALCIUM CHLORIDE 600; 310; 30; 20 MG/100ML; MG/100ML; MG/100ML; MG/100ML
1000 INJECTION, SOLUTION INTRAVENOUS
Status: COMPLETED | OUTPATIENT
Start: 2025-03-13 | End: 2025-03-14

## 2025-03-13 RX ORDER — LORAZEPAM 1 MG/1
2 TABLET ORAL
Status: COMPLETED | OUTPATIENT
Start: 2025-03-13 | End: 2025-03-13

## 2025-03-13 RX ORDER — ONDANSETRON HYDROCHLORIDE 2 MG/ML
4 INJECTION, SOLUTION INTRAVENOUS
Status: COMPLETED | OUTPATIENT
Start: 2025-03-13 | End: 2025-03-13

## 2025-03-13 RX ORDER — MORPHINE SULFATE 4 MG/ML
4 INJECTION, SOLUTION INTRAMUSCULAR; INTRAVENOUS
Refills: 0 | Status: COMPLETED | OUTPATIENT
Start: 2025-03-13 | End: 2025-03-13

## 2025-03-13 RX ADMIN — MORPHINE SULFATE 4 MG: 4 INJECTION INTRAVENOUS at 07:03

## 2025-03-13 RX ADMIN — IOHEXOL 100 ML: 350 INJECTION, SOLUTION INTRAVENOUS at 04:03

## 2025-03-13 RX ADMIN — SODIUM CHLORIDE, POTASSIUM CHLORIDE, SODIUM LACTATE AND CALCIUM CHLORIDE 1000 ML: 600; 310; 30; 20 INJECTION, SOLUTION INTRAVENOUS at 11:03

## 2025-03-13 RX ADMIN — LORAZEPAM 2 MG: 1 TABLET ORAL at 07:03

## 2025-03-13 RX ADMIN — ONDANSETRON 4 MG: 2 INJECTION INTRAMUSCULAR; INTRAVENOUS at 07:03

## 2025-03-13 RX ADMIN — SODIUM CHLORIDE 1000 ML: 9 INJECTION, SOLUTION INTRAVENOUS at 10:03

## 2025-03-13 RX ADMIN — SODIUM CHLORIDE, POTASSIUM CHLORIDE, SODIUM LACTATE AND CALCIUM CHLORIDE 1000 ML: 600; 310; 30; 20 INJECTION, SOLUTION INTRAVENOUS at 10:03

## 2025-03-13 NOTE — FIRST PROVIDER EVALUATION
Medical screening examination initiated.  I have conducted a focused provider triage encounter, findings are as follows:    Brief history of present illness:  49-year-old female presents to ED for evaluation of generalized weakness with chest pain worsening today.  Patient reports difficulty walking days due to weakness.    There were no vitals filed for this visit.    Pertinent physical exam:  Patient awake sitting in wheelchair    Brief workup plan:  labs, EKG, CXR, IVF    Preliminary workup initiated; this workup will be continued and followed by the physician or advanced practice provider that is assigned to the patient when roomed.

## 2025-03-13 NOTE — ED PROVIDER NOTES
Encounter Date: 3/13/2025    SCRIBE #1 NOTE: I, Bárbara Casiano, am scribing for, and in the presence of,  Sergio Nam MD. I have scribed the entire note.       History     Chief Complaint   Patient presents with    Chest Pain     Pt c/o chest pain, weakness, difficulty walking for several days, Denies PMH     Patient is a 49 year old female with a hx of DM and HTN presents to the ED for generalized weakness. Pt has reportedly felt generally weak over the last 2 days. Pt has been struggling to get around with a walker. Pt also reports chest and abdominal pain over this time. Pt states the pain is not severe. Pt lastly complains of chronic back pain.  And reportedly has a history of leukemia record.  States was seen at [facility].      The history is provided by the patient. No  was used.     Review of patient's allergies indicates:  No Known Allergies  Past Medical History:   Diagnosis Date    ADHD (attention deficit hyperactivity disorder)     Anxiety     Asthma     Depression     Diabetes mellitus     Encounter for blood transfusion     Essential hypertension 2022    Hypothyroidism 2020    Kidney stone 2022    Migraine without status migrainosus, not intractable 2020    Rheumatoid arthritis involving both hands with positive rheumatoid factor 2018    T12 burst fracture     Tremors of nervous system 2020    Type 2 diabetes mellitus 2024     Past Surgical History:   Procedure Laterality Date    ABDOMINAL SURGERY      APPENDECTOMY      BACK SURGERY  2018    T11 to L1 PSF w/T12 laminectomy     SECTION      CHOLECYSTECTOMY      ESOPHAGOGASTRODUODENOSCOPY N/A 12/10/2024    Procedure: EGD (ESOPHAGOGASTRODUODENOSCOPY);  Surgeon: Hua Ibarra MD;  Location: [address]);  Service: Gastroenterology;  Laterality: N/A;    GASTRIC BYPASS      HYSTERECTOMY      TONSILLECTOMY       Family History   Problem Relation Name Age of Onset     Diabetes Mother      Tremor Father      Hypertension Father      Breast cancer Maternal Aunt      Breast cancer Maternal Aunt      Breast cancer Maternal Grandmother      Tremor Brother       Social History[1]  Review of Systems   Constitutional:  Negative for fever.        Generalized weakness    HENT:  Negative for sore throat.    Eyes:  Negative for visual disturbance.   Respiratory:  Negative for shortness of breath.    Cardiovascular:  Positive for chest pain.   Gastrointestinal:  Positive for abdominal pain.   Genitourinary:  Negative for dysuria.   Musculoskeletal:  Positive for back pain. Negative for joint swelling.   Skin:  Negative for rash.   Neurological:  Negative for weakness.   Psychiatric/Behavioral:  Negative for confusion.        Physical Exam     Initial Vitals [03/13/25 1003]   BP Pulse Resp Temp SpO2   (!) 121/96 101 20 97.3 °F (36.3 °C) 96 %      MAP       --         Physical Exam    Nursing note and vitals reviewed.  Constitutional: She appears well-developed and well-nourished.   HENT:   Head: Normocephalic and atraumatic.   Eyes: EOM are normal. Pupils are equal, round, and reactive to light.   Neck:   Normal range of motion.  Cardiovascular:  Normal rate, regular rhythm, normal heart sounds and intact distal pulses.           No murmur heard.  Pulmonary/Chest: Breath sounds normal. No respiratory distress. She has no wheezes. She has no rales.   Abdominal: Abdomen is soft. She exhibits no distension. There is no abdominal tenderness. There is no rebound.   Musculoskeletal:         General: No edema. Normal range of motion.      Cervical back: Normal range of motion. Tenderness present.      Thoracic back: Tenderness present.      Lumbar back: Tenderness present.      Comments: Able to life bilateral LE.  4/5 strength bilateral lower extremities.     Neurological: She is alert. She has normal strength. No cranial nerve deficit. GCS score is 15. GCS eye subscore is 4. GCS verbal subscore is  5. GCS motor subscore is 6.   Skin: Skin is warm and dry. Capillary refill takes less than 2 seconds. No rash noted. No erythema.   Psychiatric: She has a normal mood and affect.         ED Course   Critical Care    Date/Time: 3/14/2025 2:32 AM    Performed by: Marcus Martin MD  Authorized by: Alphonse High MD  Total critical care time (exclusive of procedural time) : 65 minutes  Critical care time was exclusive of separately billable procedures and treating other patients and teaching time.  Critical care was necessary to treat or prevent imminent or life-threatening deterioration of the following conditions: cardiac failure.  Critical care was time spent personally by me on the following activities: development of treatment plan with patient or surrogate, discussions with consultants, discussions with primary provider, examination of patient, obtaining history from patient or surrogate, ordering and performing treatments and interventions, ordering and review of laboratory studies, ordering and review of radiographic studies, pulse oximetry and re-evaluation of patient's condition.        Labs Reviewed   COMPREHENSIVE METABOLIC PANEL - Abnormal       Result Value    Sodium 139      Potassium 3.1 (*)     Chloride 105      CO2 23      Glucose 78      Blood Urea Nitrogen 15.0      Creatinine 0.86      Calcium 9.6      Protein Total 7.2      Albumin 4.1      Globulin 3.1      Albumin/Globulin Ratio 1.3      Bilirubin Total 0.3            ALT 44      AST 30      eGFR >60      Anion Gap 11.0      BUN/Creatinine Ratio 17     CBC WITH DIFFERENTIAL - Abnormal    WBC 4.47 (*)     RBC 4.67      Hgb 14.4      Hct 42.2      MCV 90.4      MCH 30.8      MCHC 34.1      RDW 12.9      Platelet 328      MPV 10.0      Neut % 54.4      Lymph % 32.7      Mono % 8.7      Eos % 2.9      Basophil % 1.1      Imm Grans % 0.2      Neut # 2.43      Lymph # 1.46      Mono # 0.39      Eos # 0.13      Baso # 0.05      Imm Gran  # 0.01      BC% 0.0     DRUG SCREEN, URINE (BEAKER) - Abnormal    Amphetamines, Urine Positive (*)     Barbiturates, Urine Negative      Benzodiazepine, Urine Negative      Cannabinoids, Urine Negative      Cocaine, Urine Negative      Fentanyl, Urine Negative      MDMA, Urine Negative      Opiates, Urine Negative      Phencyclidine, Urine Negative      pH, Urine 6.0      Specific Gravity, Urine Auto 1.009      Narrative:     Cut off concentrations:    Amphetamines - 1000 ng/ml  Barbiturates - 200 ng/ml  Benzodiazepine - 200 ng/ml  Cannabinoids (THC) - 50 ng/ml  Cocaine - 300 ng/ml  Fentanyl - 1.0 ng/ml  MDMA - 500 ng/ml  Opiates - 300 ng/ml   Phencyclidine (PCP) - 25 ng/ml    Specimen submitted for drug analysis and tested for pH and specific gravity in order to evaluate sample integrity. Suspect tampering if specific gravity is <1.003 and/or pH is not within the range of 4.5 - 8.0  False negatives may result form substances such as bleach added to urine.  False positives may result for the presence of a substance with similar chemical structure to the drug or its metabolite.    This test provides only a PRELIMINARY analytical test result. A more specific alternate chemical method must be used in order to obtain a confirmed analytical result. Gas chromatography/mass spectrometry (GC/MS) is the preferred confirmatory method. Other chemical confirmation methods are available. Clinical consideration and professional judgement should be applied to any drug of abuse test result, particularly when preliminary positive results are used.    Positive results will be confirmed only at the physicians request. Unconfirmed screening results are to be used only for medical purposes (treatment).        POCT GLUCOSE - Abnormal    POCT Glucose <20 (*)    MAGNESIUM - Normal    Magnesium Level 2.20     TROPONIN I - Normal    Troponin-I <0.010     URINALYSIS, REFLEX TO URINE CULTURE - Normal    Color, UA Light-Yellow       Appearance, UA Clear      Specific Gravity, UA 1.009      pH, UA 6.0      Protein, UA Negative      Glucose, UA Normal      Ketones, UA Negative      Blood, UA Negative      Bilirubin, UA Negative      Urobilinogen, UA Normal      Nitrites, UA Negative      Leukocyte Esterase, UA Negative      RBC, UA None Seen      WBC, UA 0-5      Bacteria, UA None Seen      Squamous Epithelial Cells, UA Trace     ALCOHOL,MEDICAL (ETHANOL) - Normal    Ethanol Level <10.0     LIPASE - Normal    Lipase Level 16     HEMOGLOBIN AND HEMATOCRIT, BLOOD - Normal    Hgb 13.3      Hct 39.2     LACTIC ACID, PLASMA - Normal    Lactic Acid Level 1.0     BLOOD CULTURE [facility]   BLOOD CULTURE [facility]   CBC W/ AUTO DIFFERENTIAL    Narrative:     The following orders were created for panel order CBC auto differential.  Procedure                               Abnormality         Status                     ---------                               -----------         ------                     CBC with Differential[[id number]]       Abnormal            Final result                 Please view results for these tests on the individual orders.   POCT GLUCOSE    POCT Glucose 77     POCT GLUCOSE    POCT Glucose 80     POCT GLUCOSE    POCT Glucose 109       EKG Readings: (Independently Interpreted)   Initial Reading: No STEMI. Rhythm: Normal Sinus Rhythm. Heart Rate: 96. Ectopy: No Ectopy. Conduction: Normal. ST Segments: Normal ST Segments. T Waves: Normal. Axis: Normal.   Done on 3/13/25 at 1002.      ECG Results              EKG 12-lead (Final result)        Collection Time Result Time QRS Duration OHS QTC Calculation    03/13/25 10:02:19 03/13/25 12:39:34 98 447                     Final result by Interface, Lab In [facility] (03/13/25 12:39:44)                   Narrative:    Test Reason : R07.9,    Vent. Rate :  96 BPM     Atrial Rate :  96 BPM     P-R Int : 156 ms          QRS Dur :  98 ms      QT Int : 354 ms       P-R-T Axes :  73  56  -2 degrees    QTcB Int  : 447 ms    Normal sinus rhythm  T wave abnormality, consider inferior ischemia  Abnormal ECG  When compared with ECG of 13-Feb-2025 15:29,  Vent. rate has increased by  44 bpm  T wave inversion now evident in Inferior leads  T wave inversion now evident in Lateral leads  Confirmed by Zion Schulte ([id number]) on 3/13/2025 12:39:33 PM    Referred By:            Confirmed By: Zion Schulte                                  Imaging Results              MRI Lumbar Spine Without Contrast (Preliminary result)  Result time 03/13/25 21:39:05      Preliminary result by Luiz Arita MD (03/13/25 21:39:05)                   Narrative:    START OF REPORT:  Technique: Standard axial sagittal and coronal lumbar spine MRI sequences were performed without contrast.    Comparison: None.    Clinical history: Low back pain, cauda equina syndrome suspected.    Findings:  Distal cord and conus medullaris: Spinal cord and conus medullaris are unremarkable.  Integrity of the bone, bone marrow and discs:  Bone: There is a chronic appearing coronally oriented fracture involving the T12 vertebral bodystabilized by a posterior fusion hardware at T11 down to L1 levels as detailed in the separate report for the thoracic spine.  Discs: There is multilevel disc desiccation and circumferential disc bulges at L1-L2 down to L4-L5 levels causing varying degrees of moderate to severe bilateral neural foraminal stenosis most severe at the right L3-L4 neural foramen where there is severe stenosis and compression of the exiting nerve root as seen on series 12 image 11-19.      Impression:  1. There is multilevel disc desiccation and circumferential disc bulges at L1-L2 down to L4-L5 levels causing varying degrees of moderate to severe bilateral neural foraminal stenosis most severe at the right L3-L4 neural foramen where there is severe stenosis and compression of the exiting nerve root as seen on series 12 image 11-19. Correlate with clinical and laboratory  findings regards additional evaluation and follow-up.  2. Details and other findings, as described above.                                         MRI Thoracic Spine Without Contrast (Preliminary result)  Result time 03/13/25 21:32:26      Preliminary result by Luiz Arita MD (03/13/25 21:32:26)                   Narrative:    START OF REPORT:  Technique: Multiplanar multisequence MRI of the thoracic spine without contrast was performed in neutral position.    Comparison: None.    Clinical History: Myelopathy, acute, thoracic spine.    Findings: The study is severely limited due to motion artifact as well as pronounced metallic artifact related to dorsal spine orthopedic hardware near the thoracolumbar junction.  Spine: There is a incompletely imaged chronic fracture involving the T12 vertebral body with mild retropulsion of the posterior cortex causing mild spinal canal stenosis with no definitive associated cord compression as seen on series 4 image 10. This fracture is stabilized by a posterior fusion hardware seen at T11 down to L1 levels.  Spinal cord: No definitive abnormal cord signal is identified on this limited study.  Anatomy: Normal.  Bone and marrow degenerative changes: Normal.  bone marrow, and disc integrity: There is no significant disc bulge identified.      Impression:  1. There is a incompletely imaged chronic fracture involving the T12 vertebral body with mild retropulsion of the posterior cortex causing mild spinal canal stenosis with no definitive associated cord compression as seen on series 4 image 10. This fracture is stabilized by a posterior fusion hardware seen at T11 down to L1 levels. Correlate with clinical and laboratory findings regards additional evaluation and follow-up as clinically indicated.  2. No definitive abnormal cord signal is identified on this limited study.  3. Details and other findings as discussed.                                         CT Abdomen Pelvis With  IV Contrast NO Oral Contrast (Final result)  Result time 03/13/25 16:37:03      Final result by Nila Pérez MD (03/13/25 16:37:03)                   Impression:      1. No appreciable acute intra-abdominal abnormality by CT evaluation      Electronically signed by: Nila Pérez  Date:    03/13/2025  Time:    16:37               Narrative:    EXAMINATION:  CT ABDOMEN PELVIS WITH IV CONTRAST    CLINICAL HISTORY:  Abdominal pain, acute, nonlocalized;  chest pain, generalized weakness, difficulty walking.  Abdominal pain    TECHNIQUE:  Helically acquired images with axial, sagittal and coronal reformations were obtained from the lung bases to the pubic symphysis after the IV administration of contrast.    Automated tube current modulation, weight-based exposure dosing, and/or iterative reconstruction technique utilized to reach lowest reasonably achievable exposure rate.    DLP: 699 mGy*cm    COMPARISON:  CT abdomen pelvis 02/05/2025    FINDINGS:  HEART: Normal in size. No pericardial effusion.    LUNG BASES: Well aerated.    LIVER: Normal attenuation. No appreciable focal hepatic lesion.    BILIARY: Gallbladder is surgically absent.    PANCREAS: No inflammatory change.    SPLEEN: Normal in size    ADRENALS: No mass.    KIDNEYS/URETERS: The kidneys enhance symmetrically.  No hydronephrosis.    GI TRACT/MESENTERY: There appear to be postsurgical changes related to prior bariatric surgery.  No evidence of bowel obstruction.     Paucity of intra-abdominal fat mildly limits evaluation.    PERITONEUM: No free fluid.No free air.    LYMPH NODES: No enlarged lymph nodes by size criteria.    VASCULATURE: No significant atherosclerosis or aneurysm.    BLADDER: Normal appearance given degree of distention.    REPRODUCTIVE ORGANS: Uterus is surgically absent.    SOFT TISSUES: Unremarkable.    BONES: Postsurgical change the lumbar spinal fusion stabilizing T12 fracture.  Appearance similar to prior.  Degenerative  change at the lumbar spine.                                       X-Ray Chest 1 View (Final result)  Result time 03/13/25 11:06:54      Final result by Bradley Granados MD (03/13/25 11:06:54)                   Impression:      No acute cardiopulmonary process identified.      Electronically signed by: Bradley Granados  Date:    03/13/2025  Time:    11:06               Narrative:    EXAMINATION:  XR CHEST 1 VIEW    CLINICAL HISTORY:  Chest pain, unspecified    TECHNIQUE:  One view    COMPARISON:  March 5, 2025..    FINDINGS:  Cardiopericardial silhouette is within normal limits. Lungs are without dense focal or segmental consolidation, congestive process, pleural effusions or pneumothorax.  Scoliosis and thoracolumbar vertebrae prior fusions.                                       Medications   NORepinephrine 8 mg in dextrose 5% 250 mL infusion (0.06 mcg/kg/min × 54.4 kg Intravenous Rate/Dose Change 3/14/25 0215)   sodium chloride 0.9% bolus 1,000 mL 1,000 mL (0 mLs Intravenous Stopped 3/13/25 1135)   iohexoL (OMNIPAQUE 350) injection 100 mL (100 mLs Intravenous Given 3/13/25 1625)   LORazepam tablet 2 mg (2 mg Oral Given 3/13/25 1919)   morphine injection 4 mg (4 mg Intravenous Given 3/13/25 1955)   ondansetron injection 4 mg (4 mg Intravenous Given 3/13/25 1955)   lactated ringers bolus 1,000 mL (0 mLs Intravenous Stopped 3/13/25 2309)   lactated ringers bolus 1,000 mL (0 mLs Intravenous Stopped 3/14/25 0010)   lactated ringers infusion (0 mLs Intravenous Stopped 3/14/25 0157)   dextrose 50% injection 25 g (25 g Intravenous Not Given 3/14/25 0215)   dextrose 5 % and 0.45 % NaCl infusion (1,000 mLs Intravenous New Bag 3/14/25 0215)     Medical Decision Making  Judging by the patient's chief complaint and pertinent history, the patient has the following possible differential diagnoses, including but not limited to the following.  Some of these are deemed to be lower likelihood and some more likely based on my physical  exam and history combined with possible lab work and/or imaging studies.   Please see the pertinent studies, and refer to the HPI.  Some of these diagnoses will take further evaluation to fully rule out, perhaps as an outpatient and the patient was encouraged to follow up when discharged for more comprehensive evaluation.    Generalized weakness: anemia, dehydration, electrolyte derangement, hypoglycemia, infection, intoxication, respiratory failure, toxic ingestion, ACS, thyroid disease, medications, psychiatric, autoimmune, rhabdomyolysis, myositis, peripheral neuropathy, spinal cord lesion,     Patient is a 49-year-old female presents to emergency department for generalized weakness state not able to ambulate over the last 2-3 days.  See HPI.  See physical exam.  Initially came remain complaining of chest pain and abdominal pain.  EKG without ST elevation.  Troponin negative.  CT abdomen pelvis without any acute findings.  Will add MRI.  If unable to walk will require admission.    Problems Addressed:  Back pain, unspecified back location, unspecified back pain laterality, unspecified chronicity: acute illness or injury that poses a threat to life or bodily functions  Chest pain: acute illness or injury that poses a threat to life or bodily functions  Weakness: acute illness or injury that poses a threat to life or bodily functions    Amount and/or Complexity of Data Reviewed  Labs: ordered.  Radiology: ordered.    Risk  Prescription drug management.  Parenteral controlled substances.  Decision regarding hospitalization.            Scribe Attestation:   Scribe #1: I performed the above scribed service and the documentation accurately describes the services I performed. I attest to the accuracy of the note.    Attending Attestation:           Physician Attestation for Scribe:  Physician Attestation Statement for Scribe #1: I, Sergio Nam MD, reviewed documentation, as scribed by Bárbara Casiano in my presence, and  it is both accurate and complete.             ED Course as of 03/14/25 0232   Fri Mar 14, 2025   0231 Patient's MRI showed multilevel neural foraminal stenosis she had persistent hypotension despite multiple fluid boluses eventually having a blood pressure of 70s over 40s requiring Levophed.  Later in the hospitalization the patient also had profound hypoglycemia requiring D50.    We will admit to the ICU with frequent blood glucose checks.  The patient is on D5 half drip and Levophed at this time.  Do not suspect sepsis [NL]      ED Course User Index  [NL] Marcus Martin MD                           Clinical Impression:  Final diagnoses:  [R07.9] Chest pain  [R53.1] Weakness (Primary)  [M54.9] Back pain, unspecified back location, unspecified back pain laterality, unspecified chronicity  [I95.9] Hypotension          ED Disposition Condition    Admit                     [1]   Social History  Tobacco Use    Smoking status: Never    Smokeless tobacco: Never   Substance Use Topics    Alcohol use: Not Currently    Drug use: Never        Marcus Martin MD  03/14/25 0232

## 2025-03-14 PROBLEM — E44.0 MODERATE MALNUTRITION: Status: ACTIVE | Noted: 2025-03-14

## 2025-03-14 LAB
ALBUMIN SERPL-MCNC: 3 G/DL (ref 3.5–5)
ALBUMIN/GLOB SERPL: 1.4 RATIO (ref 1.1–2)
ALLENS TEST BLOOD GAS (OHS): YES
ALP SERPL-CCNC: 195 UNIT/L (ref 40–150)
ALT SERPL-CCNC: 422 UNIT/L (ref 0–55)
AMMONIA PLAS-MSCNC: 27.4 UMOL/L (ref 18–72)
ANION GAP SERPL CALC-SCNC: 7 MEQ/L
APAP SERPL-MCNC: <3 UG/ML (ref 10–30)
APICAL FOUR CHAMBER EJECTION FRACTION: 60 %
APICAL TWO CHAMBER EJECTION FRACTION: 57 %
AST SERPL-CCNC: 878 UNIT/L (ref 5–34)
AV INDEX (PROSTH): 0.6
AV MEAN GRADIENT: 3 MMHG
AV PEAK GRADIENT: 5 MMHG
AV VALVE AREA BY VELOCITY RATIO: 2 CM²
AV VALVE AREA: 1.9 CM²
AV VELOCITY RATIO: 0.64
BASE EXCESS BLD CALC-SCNC: 1.3 MMOL/L (ref -2–2)
BASOPHILS # BLD AUTO: 0.03 X10(3)/MCL
BASOPHILS NFR BLD AUTO: 0.7 %
BILIRUB SERPL-MCNC: 0.3 MG/DL
BLOOD GAS SAMPLE TYPE (OHS): ABNORMAL
BSA FOR ECHO PROCEDURE: 1.79 M2
BUN SERPL-MCNC: 10.9 MG/DL (ref 9.8–20.1)
CA-I BLD-SCNC: 1.17 MMOL/L (ref 1.12–1.23)
CALCIUM SERPL-MCNC: 8.2 MG/DL (ref 8.4–10.2)
CHLORIDE SERPL-SCNC: 110 MMOL/L (ref 98–107)
CK SERPL-CCNC: 40 U/L (ref 29–168)
CO2 BLDA-SCNC: 27.1 MMOL/L
CO2 SERPL-SCNC: 25 MMOL/L (ref 22–29)
COHGB MFR BLDA: 2.3 % (ref 0.5–1.5)
CREAT SERPL-MCNC: 0.79 MG/DL (ref 0.55–1.02)
CREAT/UREA NIT SERPL: 14
CRP SERPL-MCNC: 1 MG/L
CV ECHO LV RWT: 0.49 CM
DOP CALC AO PEAK VEL: 1.1 M/S
DOP CALC AO VTI: 24 CM
DOP CALC LVOT AREA: 3.1 CM2
DOP CALC LVOT DIAMETER: 2 CM
DOP CALC LVOT PEAK VEL: 0.7 M/S
DOP CALC LVOT STROKE VOLUME: 44.9 CM3
DOP CALC MV VTI: 23.7 CM
DOP CALCLVOT PEAK VEL VTI: 14.3 CM
DRAWN BY BLOOD GAS (OHS): ABNORMAL
E WAVE DECELERATION TIME: 187 MSEC
E/A RATIO: 0.91
E/E' RATIO: 5 M/S
ECHO LV POSTERIOR WALL: 0.9 CM (ref 0.6–1.1)
EOSINOPHIL # BLD AUTO: 0.13 X10(3)/MCL (ref 0–0.9)
EOSINOPHIL NFR BLD AUTO: 3.1 %
ERYTHROCYTE [DISTWIDTH] IN BLOOD BY AUTOMATED COUNT: 13.1 % (ref 11.5–17)
ERYTHROCYTE [SEDIMENTATION RATE] IN BLOOD: 5 MM/HR (ref 0–30)
FERRITIN SERPL-MCNC: 35.6 NG/ML (ref 4.63–204)
FOLATE SERPL-MCNC: 12.4 NG/ML (ref 7–31.4)
FRACTIONAL SHORTENING: 40.5 % (ref 28–44)
GFR SERPLBLD CREATININE-BSD FMLA CKD-EPI: >60 ML/MIN/1.73/M2
GGT SERPL-CCNC: 94 U/L (ref 9–36)
GLOBULIN SER-MCNC: 2.1 GM/DL (ref 2.4–3.5)
GLUCOSE SERPL-MCNC: 104 MG/DL (ref 74–100)
HAV IGM SERPL QL IA: NONREACTIVE
HBV CORE IGM SERPL QL IA: NONREACTIVE
HBV SURFACE AG SERPL QL IA: NONREACTIVE
HCO3 BLDA-SCNC: 25.9 MMOL/L (ref 22–26)
HCT VFR BLD AUTO: 37.3 % (ref 37–47)
HCT VFR BLD AUTO: 39.2 % (ref 37–47)
HCV AB SERPL QL IA: NONREACTIVE
HGB BLD-MCNC: 12.3 G/DL (ref 12–16)
HGB BLD-MCNC: 13.3 G/DL (ref 12–16)
HR MV ECHO: 78 BPM
IMM GRANULOCYTES # BLD AUTO: 0.01 X10(3)/MCL (ref 0–0.04)
IMM GRANULOCYTES NFR BLD AUTO: 0.2 %
INR PPP: 1
INTERVENTRICULAR SEPTUM: 0.8 CM (ref 0.6–1.1)
IRON SATN MFR SERPL: 15 % (ref 20–50)
IRON SERPL-MCNC: 50 UG/DL (ref 50–170)
LACTATE SERPL-SCNC: 1 MMOL/L (ref 0.5–2.2)
LEFT ATRIUM AREA SYSTOLIC (APICAL 2 CHAMBER): 9.65 CM2
LEFT ATRIUM AREA SYSTOLIC (APICAL 4 CHAMBER): 11.9 CM2
LEFT ATRIUM SIZE: 2.8 CM
LEFT INTERNAL DIMENSION IN SYSTOLE: 2.2 CM (ref 2.1–4)
LEFT VENTRICLE DIASTOLIC VOLUME INDEX: 32.2 ML/M2
LEFT VENTRICLE DIASTOLIC VOLUME: 57 ML
LEFT VENTRICLE END DIASTOLIC VOLUME APICAL 2 CHAMBER: 74.4 ML
LEFT VENTRICLE END DIASTOLIC VOLUME APICAL 4 CHAMBER: 78.8 ML
LEFT VENTRICLE END SYSTOLIC VOLUME APICAL 2 CHAMBER: 21 ML
LEFT VENTRICLE END SYSTOLIC VOLUME APICAL 4 CHAMBER: 25.5 ML
LEFT VENTRICLE MASS INDEX: 50.5 G/M2
LEFT VENTRICLE SYSTOLIC VOLUME INDEX: 9 ML/M2
LEFT VENTRICLE SYSTOLIC VOLUME: 16 ML
LEFT VENTRICULAR INTERNAL DIMENSION IN DIASTOLE: 3.7 CM (ref 3.5–6)
LEFT VENTRICULAR MASS: 89.5 G
LV LATERAL E/E' RATIO: 3.3 M/S
LV SEPTAL E/E' RATIO: 7.7 M/S
LVED V (TEICH): 56.6 ML
LVES V (TEICH): 15.8 ML
LVOT MG: 1 MMHG
LVOT MV: 0.46 CM/S
LYMPHOCYTES # BLD AUTO: 0.97 X10(3)/MCL (ref 0.6–4.6)
LYMPHOCYTES NFR BLD AUTO: 23 %
MAGNESIUM SERPL-MCNC: 2 MG/DL (ref 1.6–2.6)
MCH RBC QN AUTO: 30.4 PG (ref 27–31)
MCHC RBC AUTO-ENTMCNC: 33 G/DL (ref 33–36)
MCV RBC AUTO: 92.3 FL (ref 80–94)
METHGB MFR BLDA: 0.8 % (ref 0.4–1.5)
MONOCYTES # BLD AUTO: 0.46 X10(3)/MCL (ref 0.1–1.3)
MONOCYTES NFR BLD AUTO: 10.9 %
MV MEAN GRADIENT: 2 MMHG
MV PEAK A VEL: 0.76 M/S
MV PEAK E VEL: 0.69 M/S
MV PEAK GRADIENT: 3 MMHG
MV STENOSIS PRESSURE HALF TIME: 56 MS
MV VALVE AREA BY CONTINUITY EQUATION: 1.89 CM2
MV VALVE AREA P 1/2 METHOD: 3.93 CM2
NEUTROPHILS # BLD AUTO: 2.62 X10(3)/MCL (ref 2.1–9.2)
NEUTROPHILS NFR BLD AUTO: 62.1 %
NRBC BLD AUTO-RTO: 0 %
O2 HB BLOOD GAS (OHS): 95 % (ref 94–97)
OHS LV EJECTION FRACTION SIMPSONS BIPLANE MOD: 59 %
OHS QRS DURATION: 100 MS
OHS QTC CALCULATION: 434 MS
OXYGEN DEVICE BLOOD GAS (OHS): ABNORMAL
OXYHGB MFR BLDA: 12.3 G/DL (ref 12–16)
PCO2 BLDA: 40 MMHG (ref 35–45)
PH BLDA: 7.42 [PH] (ref 7.35–7.45)
PHOSPHATE SERPL-MCNC: 3.3 MG/DL (ref 2.3–4.7)
PISA TR MAX VEL: 1.8 M/S
PLATELET # BLD AUTO: 294 X10(3)/MCL (ref 130–400)
PMV BLD AUTO: 9.9 FL (ref 7.4–10.4)
PO2 BLDA: 93 MMHG (ref 80–100)
POCT GLUCOSE: 101 MG/DL (ref 70–110)
POCT GLUCOSE: 109 MG/DL (ref 70–110)
POCT GLUCOSE: 142 MG/DL (ref 70–110)
POCT GLUCOSE: 70 MG/DL (ref 70–110)
POCT GLUCOSE: 73 MG/DL (ref 70–110)
POCT GLUCOSE: 76 MG/DL (ref 70–110)
POCT GLUCOSE: 81 MG/DL (ref 70–110)
POCT GLUCOSE: 92 MG/DL (ref 70–110)
POCT GLUCOSE: <20 MG/DL (ref 70–110)
POTASSIUM BLOOD GAS (OHS): 3.7 MMOL/L (ref 3.5–5)
POTASSIUM SERPL-SCNC: 2.9 MMOL/L (ref 3.5–5.1)
POTASSIUM SERPL-SCNC: 3.8 MMOL/L (ref 3.5–5.1)
PROT SERPL-MCNC: 5.1 GM/DL (ref 6.4–8.3)
PROTHROMBIN TIME: 12.5 SECONDS (ref 12.5–14.5)
RA PRESSURE ESTIMATED: 3 MMHG
RBC # BLD AUTO: 4.04 X10(6)/MCL (ref 4.2–5.4)
RV TB RVSP: 5 MMHG
SALICYLATES SERPL-MCNC: <5 MG/DL (ref 15–30)
SAMPLE SITE BLOOD GAS (OHS): ABNORMAL
SAO2 % BLDA: 97.4 %
SINUS: 3 CM
SODIUM BLOOD GAS (OHS): 139 MMOL/L (ref 137–145)
SODIUM SERPL-SCNC: 142 MMOL/L (ref 136–145)
TDI LATERAL: 0.21 M/S
TDI SEPTAL: 0.09 M/S
TDI: 0.15 M/S
TIBC SERPL-MCNC: 279 UG/DL (ref 70–310)
TIBC SERPL-MCNC: 329 UG/DL (ref 250–450)
TR MAX PG: 11 MMHG
TRANSFERRIN SERPL-MCNC: 316 MG/DL (ref 180–382)
TRICUSPID ANNULAR PLANE SYSTOLIC EXCURSION: 2.17 CM
TROPONIN I SERPL-MCNC: <0.01 NG/ML (ref 0–0.04)
TROPONIN I SERPL-MCNC: <0.01 NG/ML (ref 0–0.04)
TSH SERPL-ACNC: 3.76 UIU/ML (ref 0.35–4.94)
TV REST PULMONARY ARTERY PRESSURE: 16 MMHG
VIT B12 SERPL-MCNC: >2000 PG/ML (ref 213–816)
WBC # BLD AUTO: 4.22 X10(3)/MCL (ref 4.5–11.5)
Z-SCORE OF LEFT VENTRICULAR DIMENSION IN END DIASTOLE: -2.79
Z-SCORE OF LEFT VENTRICULAR DIMENSION IN END SYSTOLE: -2.52

## 2025-03-14 PROCEDURE — 93010 ELECTROCARDIOGRAM REPORT: CPT | Mod: ,,, | Performed by: INTERNAL MEDICINE

## 2025-03-14 PROCEDURE — 86140 C-REACTIVE PROTEIN: CPT

## 2025-03-14 PROCEDURE — 25000003 PHARM REV CODE 250: Performed by: EMERGENCY MEDICINE

## 2025-03-14 PROCEDURE — 84630 ASSAY OF ZINC: CPT

## 2025-03-14 PROCEDURE — 25000003 PHARM REV CODE 250: Performed by: INTERNAL MEDICINE

## 2025-03-14 PROCEDURE — 36600 WITHDRAWAL OF ARTERIAL BLOOD: CPT

## 2025-03-14 PROCEDURE — 25000003 PHARM REV CODE 250

## 2025-03-14 PROCEDURE — 85025 COMPLETE CBC W/AUTO DIFF WBC: CPT | Performed by: HOSPITALIST

## 2025-03-14 PROCEDURE — 82803 BLOOD GASES ANY COMBINATION: CPT

## 2025-03-14 PROCEDURE — 82977 ASSAY OF GGT: CPT

## 2025-03-14 PROCEDURE — 85018 HEMOGLOBIN: CPT | Performed by: EMERGENCY MEDICINE

## 2025-03-14 PROCEDURE — S5010 5% DEXTROSE AND 0.45% SALINE: HCPCS | Performed by: EMERGENCY MEDICINE

## 2025-03-14 PROCEDURE — 82746 ASSAY OF FOLIC ACID SERUM: CPT

## 2025-03-14 PROCEDURE — 80321 ALCOHOLS BIOMARKERS 1OR 2: CPT

## 2025-03-14 PROCEDURE — 93005 ELECTROCARDIOGRAM TRACING: CPT

## 2025-03-14 PROCEDURE — 85610 PROTHROMBIN TIME: CPT | Performed by: INTERNAL MEDICINE

## 2025-03-14 PROCEDURE — 84132 ASSAY OF SERUM POTASSIUM: CPT | Performed by: HOSPITALIST

## 2025-03-14 PROCEDURE — 80179 DRUG ASSAY SALICYLATE: CPT

## 2025-03-14 PROCEDURE — 63600175 PHARM REV CODE 636 W HCPCS: Performed by: EMERGENCY MEDICINE

## 2025-03-14 PROCEDURE — 80143 DRUG ASSAY ACETAMINOPHEN: CPT

## 2025-03-14 PROCEDURE — 63600175 PHARM REV CODE 636 W HCPCS: Mod: JZ,TB | Performed by: INTERNAL MEDICINE

## 2025-03-14 PROCEDURE — 84100 ASSAY OF PHOSPHORUS: CPT | Performed by: HOSPITALIST

## 2025-03-14 PROCEDURE — 36415 COLL VENOUS BLD VENIPUNCTURE: CPT | Performed by: HOSPITALIST

## 2025-03-14 PROCEDURE — 83735 ASSAY OF MAGNESIUM: CPT | Performed by: HOSPITALIST

## 2025-03-14 PROCEDURE — 96365 THER/PROPH/DIAG IV INF INIT: CPT

## 2025-03-14 PROCEDURE — 82962 GLUCOSE BLOOD TEST: CPT

## 2025-03-14 PROCEDURE — 87040 BLOOD CULTURE FOR BACTERIA: CPT | Performed by: EMERGENCY MEDICINE

## 2025-03-14 PROCEDURE — 84484 ASSAY OF TROPONIN QUANT: CPT | Performed by: NURSE PRACTITIONER

## 2025-03-14 PROCEDURE — 96374 THER/PROPH/DIAG INJ IV PUSH: CPT | Mod: 59

## 2025-03-14 PROCEDURE — 20000000 HC ICU ROOM

## 2025-03-14 PROCEDURE — 99900035 HC TECH TIME PER 15 MIN (STAT)

## 2025-03-14 PROCEDURE — 80053 COMPREHEN METABOLIC PANEL: CPT | Performed by: HOSPITALIST

## 2025-03-14 PROCEDURE — 83605 ASSAY OF LACTIC ACID: CPT | Performed by: EMERGENCY MEDICINE

## 2025-03-14 PROCEDURE — 80074 ACUTE HEPATITIS PANEL: CPT

## 2025-03-14 PROCEDURE — 25000242 PHARM REV CODE 250 ALT 637 W/ HCPCS: Performed by: INTERNAL MEDICINE

## 2025-03-14 PROCEDURE — 82525 ASSAY OF COPPER: CPT

## 2025-03-14 PROCEDURE — 25000003 PHARM REV CODE 250: Performed by: HOSPITALIST

## 2025-03-14 PROCEDURE — 63600175 PHARM REV CODE 636 W HCPCS

## 2025-03-14 PROCEDURE — 85652 RBC SED RATE AUTOMATED: CPT

## 2025-03-14 PROCEDURE — 82607 VITAMIN B-12: CPT

## 2025-03-14 PROCEDURE — 82550 ASSAY OF CK (CPK): CPT

## 2025-03-14 PROCEDURE — 82140 ASSAY OF AMMONIA: CPT

## 2025-03-14 PROCEDURE — 36415 COLL VENOUS BLD VENIPUNCTURE: CPT | Performed by: INTERNAL MEDICINE

## 2025-03-14 PROCEDURE — 36415 COLL VENOUS BLD VENIPUNCTURE: CPT

## 2025-03-14 RX ORDER — HYDROCODONE BITARTRATE AND ACETAMINOPHEN 5; 325 MG/1; MG/1
2 TABLET ORAL EVERY 6 HOURS PRN
Refills: 0 | Status: DISCONTINUED | OUTPATIENT
Start: 2025-03-14 | End: 2025-03-16 | Stop reason: HOSPADM

## 2025-03-14 RX ORDER — GLUCAGON 1 MG
1 KIT INJECTION ONCE
Status: DISCONTINUED | OUTPATIENT
Start: 2025-03-14 | End: 2025-03-14

## 2025-03-14 RX ORDER — LISDEXAMFETAMINE DIMESYLATE 70 MG/1
70 CAPSULE ORAL DAILY
Status: DISCONTINUED | OUTPATIENT
Start: 2025-03-14 | End: 2025-03-16 | Stop reason: HOSPADM

## 2025-03-14 RX ORDER — SODIUM CHLORIDE, SODIUM LACTATE, POTASSIUM CHLORIDE, CALCIUM CHLORIDE 600; 310; 30; 20 MG/100ML; MG/100ML; MG/100ML; MG/100ML
INJECTION, SOLUTION INTRAVENOUS CONTINUOUS
Status: DISCONTINUED | OUTPATIENT
Start: 2025-03-14 | End: 2025-03-15

## 2025-03-14 RX ORDER — FAMOTIDINE 20 MG/1
20 TABLET, FILM COATED ORAL DAILY
Status: DISCONTINUED | OUTPATIENT
Start: 2025-03-14 | End: 2025-03-16 | Stop reason: HOSPADM

## 2025-03-14 RX ORDER — TOPIRAMATE 200 MG/1
200 TABLET ORAL 2 TIMES DAILY
COMMUNITY

## 2025-03-14 RX ORDER — POTASSIUM CHLORIDE 14.9 MG/ML
20 INJECTION INTRAVENOUS ONCE
Status: COMPLETED | OUTPATIENT
Start: 2025-03-14 | End: 2025-03-14

## 2025-03-14 RX ORDER — LEVOTHYROXINE SODIUM 125 UG/1
125 TABLET ORAL
COMMUNITY

## 2025-03-14 RX ORDER — NOREPINEPHRINE BITARTRATE/D5W 8 MG/250ML
0-3 PLASTIC BAG, INJECTION (ML) INTRAVENOUS CONTINUOUS
Status: DISCONTINUED | OUTPATIENT
Start: 2025-03-14 | End: 2025-03-14

## 2025-03-14 RX ORDER — NOREPINEPHRINE BITARTRATE/D5W 8 MG/250ML
0-3 PLASTIC BAG, INJECTION (ML) INTRAVENOUS CONTINUOUS
Status: DISCONTINUED | OUTPATIENT
Start: 2025-03-14 | End: 2025-03-15

## 2025-03-14 RX ORDER — LEVOTHYROXINE SODIUM 125 UG/1
125 TABLET ORAL
Status: DISCONTINUED | OUTPATIENT
Start: 2025-03-15 | End: 2025-03-14

## 2025-03-14 RX ORDER — ACETAMINOPHEN 500 MG
1000 TABLET ORAL ONCE
Status: DISCONTINUED | OUTPATIENT
Start: 2025-03-14 | End: 2025-03-14

## 2025-03-14 RX ORDER — HYDROCODONE BITARTRATE AND ACETAMINOPHEN 5; 325 MG/1; MG/1
1 TABLET ORAL EVERY 6 HOURS PRN
Refills: 0 | Status: DISCONTINUED | OUTPATIENT
Start: 2025-03-14 | End: 2025-03-14

## 2025-03-14 RX ORDER — SODIUM CHLORIDE 0.9 % (FLUSH) 0.9 %
10 SYRINGE (ML) INJECTION
Status: DISCONTINUED | OUTPATIENT
Start: 2025-03-14 | End: 2025-03-16 | Stop reason: HOSPADM

## 2025-03-14 RX ORDER — MUPIROCIN 20 MG/G
OINTMENT TOPICAL 2 TIMES DAILY
Status: DISCONTINUED | OUTPATIENT
Start: 2025-03-14 | End: 2025-03-16 | Stop reason: HOSPADM

## 2025-03-14 RX ORDER — DEXTROSE MONOHYDRATE AND SODIUM CHLORIDE 5; .45 G/100ML; G/100ML
1000 INJECTION, SOLUTION INTRAVENOUS
Status: COMPLETED | OUTPATIENT
Start: 2025-03-14 | End: 2025-03-14

## 2025-03-14 RX ORDER — LEVOTHYROXINE SODIUM 125 UG/1
125 TABLET ORAL
Status: DISCONTINUED | OUTPATIENT
Start: 2025-03-15 | End: 2025-03-16 | Stop reason: HOSPADM

## 2025-03-14 RX ORDER — ALBUTEROL SULFATE 90 UG/1
2 INHALANT RESPIRATORY (INHALATION) EVERY 6 HOURS PRN
Status: DISCONTINUED | OUTPATIENT
Start: 2025-03-14 | End: 2025-03-16 | Stop reason: HOSPADM

## 2025-03-14 RX ORDER — GLUCAGON 1 MG
1 KIT INJECTION ONCE
Status: COMPLETED | OUTPATIENT
Start: 2025-03-14 | End: 2025-03-14

## 2025-03-14 RX ORDER — FAMOTIDINE 20 MG/1
20 TABLET, FILM COATED ORAL 2 TIMES DAILY
Status: DISCONTINUED | OUTPATIENT
Start: 2025-03-14 | End: 2025-03-14 | Stop reason: SDUPTHER

## 2025-03-14 RX ORDER — ENOXAPARIN SODIUM 100 MG/ML
40 INJECTION SUBCUTANEOUS EVERY 24 HOURS
Status: DISCONTINUED | OUTPATIENT
Start: 2025-03-15 | End: 2025-03-16 | Stop reason: HOSPADM

## 2025-03-14 RX ORDER — ACETAMINOPHEN 500 MG
500 TABLET ORAL EVERY 6 HOURS PRN
Status: ON HOLD | COMMUNITY
End: 2025-03-16 | Stop reason: HOSPADM

## 2025-03-14 RX ADMIN — GLUCAGON 1 MG: KIT at 08:03

## 2025-03-14 RX ADMIN — MUPIROCIN: 20 OINTMENT TOPICAL at 08:03

## 2025-03-14 RX ADMIN — HYDROCODONE BITARTRATE AND ACETAMINOPHEN 2 TABLET: 5; 325 TABLET ORAL at 06:03

## 2025-03-14 RX ADMIN — MUPIROCIN: 20 OINTMENT TOPICAL at 09:03

## 2025-03-14 RX ADMIN — HYDROCORTISONE SODIUM SUCCINATE 100 MG: 100 INJECTION, POWDER, FOR SOLUTION INTRAMUSCULAR; INTRAVENOUS at 02:03

## 2025-03-14 RX ADMIN — NOREPINEPHRINE BITARTRATE 0.06 MCG/KG/MIN: 8 INJECTION, SOLUTION INTRAVENOUS at 04:03

## 2025-03-14 RX ADMIN — DEXTROSE MONOHYDRATE: 25 INJECTION, SOLUTION INTRAVENOUS at 01:03

## 2025-03-14 RX ADMIN — HYDROCODONE BITARTRATE AND ACETAMINOPHEN 1 TABLET: 5; 325 TABLET ORAL at 03:03

## 2025-03-14 RX ADMIN — HYDROCORTISONE SODIUM SUCCINATE 100 MG: 100 INJECTION, POWDER, FOR SOLUTION INTRAMUSCULAR; INTRAVENOUS at 08:03

## 2025-03-14 RX ADMIN — DEXTROSE AND SODIUM CHLORIDE 1000 ML: 5; 450 INJECTION, SOLUTION INTRAVENOUS at 02:03

## 2025-03-14 RX ADMIN — LISDEXAMFETAMINE DIMESYLATE 70 MG: 70 CAPSULE ORAL at 01:03

## 2025-03-14 RX ADMIN — NOREPINEPHRINE BITARTRATE 0.02 MCG/KG/MIN: 8 INJECTION, SOLUTION INTRAVENOUS at 01:03

## 2025-03-14 RX ADMIN — SODIUM CHLORIDE, POTASSIUM CHLORIDE, SODIUM LACTATE AND CALCIUM CHLORIDE 1000 ML: 600; 310; 30; 20 INJECTION, SOLUTION INTRAVENOUS at 12:03

## 2025-03-14 RX ADMIN — FAMOTIDINE 20 MG: 20 TABLET, FILM COATED ORAL at 02:03

## 2025-03-14 RX ADMIN — HYDROCORTISONE SODIUM SUCCINATE 100 MG: 100 INJECTION, POWDER, FOR SOLUTION INTRAMUSCULAR; INTRAVENOUS at 09:03

## 2025-03-14 RX ADMIN — POTASSIUM CHLORIDE 20 MEQ: 14.9 INJECTION, SOLUTION INTRAVENOUS at 09:03

## 2025-03-14 RX ADMIN — SODIUM CHLORIDE, POTASSIUM CHLORIDE, SODIUM LACTATE AND CALCIUM CHLORIDE: 600; 310; 30; 20 INJECTION, SOLUTION INTRAVENOUS at 06:03

## 2025-03-14 RX ADMIN — POTASSIUM CHLORIDE 20 MEQ: 14.9 INJECTION, SOLUTION INTRAVENOUS at 06:03

## 2025-03-14 NOTE — H&P
[facility]  Pulmonary Critical Care Note    Patient Name: Tayla Lambert  MRN: [id number]  Admission Date: 3/13/2025  Hospital Length of Stay: 0 days  Code Status: Full Code  Attending Provider: Alphonse High MD  Primary Care Provider: Iliana Merritt MD     Subjective:     HPI:   Tayla Lambert 49 y.o. female with pmhx of hypothyroidism, DM, HTN, hx of gastric bypass in 90s, hx of multiple back surgeries presented to ED for 2 day hx of weakness, chest pain, and abdominal pain. Patient endorses daily vomiting and chronic diarrhea for 3 years. Troponin normal. CBC H/H 13.3/39.2, CMP with mild hypokalemia 3.1. TSH normal. CT abdomen and pelvis performed in ED with no acute abnormalities, MRI of thoracic and lumbar spine with posterior fusion of T11-L1 and moderate to severe bilateral neural foramen stenosis. During work up in ED, patient had multiple hypotensive episodes in 70s/40s, received 3L LR bolus within mild improvement, ultimately requiring levophed. Concurrently, patient also had hypoglycemic episode with CBGs in 20s, resolved with D50W in ED. ICU consulted for observation with unknown source of hypoglycemia and hypotension.     Lactic acid normal, blood culture x2 pending.    Per chart review, patient has had chronic episodes of hypoglycemia in past, previous workup with normal ACTH stimulation. Multiple low A1c results. Multiple visits reporting possible poor oral intake or polypharmacy.  Hospital Course/Significant events:  3/14: admission to ICU on pressors for hypotension and hypoglycemia    24 Hour Interval History:  See HPI    Past Medical History:   Diagnosis Date    ADHD (attention deficit hyperactivity disorder)     Anxiety     Asthma     Depression     Diabetes mellitus     Encounter for blood transfusion     Essential hypertension 02/22/2022    Hypothyroidism 02/04/2020    Kidney stone 02/08/2022    Migraine without status migrainosus, not intractable  2020    Rheumatoid arthritis involving both hands with positive rheumatoid factor 2018    T12 burst fracture     Tremors of nervous system 2020    Type 2 diabetes mellitus 2024       Past Surgical History:   Procedure Laterality Date    ABDOMINAL SURGERY      APPENDECTOMY      BACK SURGERY  2018    T11 to L1 PSF w/T12 laminectomy     SECTION      CHOLECYSTECTOMY      ESOPHAGOGASTRODUODENOSCOPY N/A 12/10/2024    Procedure: EGD (ESOPHAGOGASTRODUODENOSCOPY);  Surgeon: Hua Ibarra MD;  Location: [facility] ([address] [place] [address]);  Service: Gastroenterology;  Laterality: N/A;    GASTRIC BYPASS      HYSTERECTOMY      TONSILLECTOMY         Social History[1]        Current Outpatient Medications   Medication Instructions    albuterol (PROVENTIL/VENTOLIN HFA) 90 mcg/actuation inhaler 2 puffs, Inhalation, Every 6 hours PRN, Rescue    cetirizine (ZYRTEC) 10 MG tablet 1 tablet, Every morning    divalproex ER (DEPAKOTE ER) 250 mg, Oral, Daily    ergocalciferol (VITAMIN D2) 50,000 Units, Every 7 days    estradioL (ESTRACE) 1 mg, Oral, Daily    FLUoxetine 20 MG capsule 1 capsule, Daily    fluticasone propionate (FLONASE) 50 mcg/actuation nasal spray 1 spray    gabapentin (NEURONTIN) 300 mg, Oral, 3 times daily    HYDROcodone-acetaminophen (NORCO)  mg per tablet 1 tablet, Oral, Every 6 hours PRN    hydrOXYzine (ATARAX) 50 mg, Every 6 hours PRN    hyoscyamine 0.125 mg, Sublingual, Every 4 hours PRN    lurasidone (LATUDA) 60 mg Tab tablet 1 tablet, Daily    olmesartan (BENICAR) 40 mg, Oral, Daily    ondansetron (ZOFRAN) 4 mg, Oral, Every 8 hours PRN    pantoprazole (PROTONIX) 40 MG tablet Take 1 tablet (40 mg total) by mouth 2 (two) times daily for 56 days, THEN 1 tablet (40 mg total) once daily.    polyethylene glycol (GLYCOLAX) 17 g, Oral, Daily    promethazine (PHENERGAN) 25 MG tablet 1 tablet, Every 4 hours    temazepam (RESTORIL) 15 mg, Oral, Nightly PRN    tirzepatide 15 mg, Subcutaneous,  Every 7 days    triamcinolone acetonide 0.1% (KENALOG) 0.1 % cream Topical (Top), 2 times daily, For eczema flares. Do not use for more than 14 days at a time.    TRINTELLIX 20 mg, Oral, Nightly    valACYclovir (VALTREX) 1,000 mg, Oral, 2 times daily, For 5 days with outbreaks.    VRAYLAR 1.5 mg, Oral, Nightly    VYVANSE 70 mg, Oral, Every morning       Review of patient's allergies indicates:  No Known Allergies     Current Inpatient Medications   acetaminophen  1,000 mg Oral Once    [START ON 3/15/2025] enoxparin  40 mg Subcutaneous Daily    famotidine  20 mg Oral BID    mupirocin   Nasal BID    potassium bicarbonate  50 mEq Oral Once       Current Intravenous Infusions   NORepinephrine bitartrate-D5W  0-3 mcg/kg/min Intravenous Continuous 6.1 mL/hr at 03/14/25 0215 0.06 mcg/kg/min at 03/14/25 0215         Review of Systems   Constitutional:  Positive for chills and weight loss. Negative for fever.   Respiratory:  Negative for cough and shortness of breath.    Cardiovascular:  Negative for chest pain and leg swelling.   Gastrointestinal:  Positive for diarrhea and nausea. Negative for vomiting.   Genitourinary:  Negative for dysuria.   Neurological:  Positive for weakness. Negative for dizziness and headaches.          Objective:       Intake/Output Summary (Last 24 hours) at 3/14/2025 0429  Last data filed at 3/14/2025 0121  Gross per 24 hour   Intake 0.84 ml   Output --   Net 0.84 ml         Vital Signs (Most Recent):  Temp: 97.5 °F (36.4 °C) (03/14/25 0345)  Pulse: 71 (03/14/25 0345)  Resp: 15 (03/14/25 0345)  BP: 95/66 (03/14/25 0345)  SpO2: 98 % (03/14/25 0345)  Body mass index is 19.97 kg/m².  Weight: 54.4 kg (120 lb) Vital Signs (24h Range):  Temp:  [97.3 °F (36.3 °C)-97.6 °F (36.4 °C)] 97.5 °F (36.4 °C)  Pulse:  [] 71  Resp:  [10-20] 15  SpO2:  [94 %-100 %] 98 %  BP: ()/(46-96) 95/66     Physical Exam  Vitals and nursing note reviewed.   Constitutional:       Appearance: She is ill-appearing.  She is not toxic-appearing.      Comments: Somnolent, arousal with voice, answers questions appropriately   HENT:      Head: Normocephalic and atraumatic.      Nose: Nose normal.      Mouth/Throat:      Mouth: Mucous membranes are moist.   Eyes:      Extraocular Movements: Extraocular movements intact.      Pupils: Pupils are equal, round, and reactive to light.   Cardiovascular:      Rate and Rhythm: Normal rate and regular rhythm.      Pulses: Normal pulses.      Heart sounds: Normal heart sounds. No murmur heard.  Pulmonary:      Effort: Pulmonary effort is normal. No respiratory distress.      Breath sounds: Normal breath sounds. No wheezing or rales.   Abdominal:      General: Abdomen is flat. Bowel sounds are normal.      Palpations: Abdomen is soft. There is no mass.      Tenderness: There is abdominal tenderness (tenderness to palpation on lower abdominal exam and epigastric).   Musculoskeletal:         General: No swelling or tenderness.      Cervical back: Normal range of motion.      Right lower leg: No edema.      Left lower leg: No edema.   Lymphadenopathy:      Cervical: No cervical adenopathy.   Skin:     Capillary Refill: Capillary refill takes less than 2 seconds.      Coloration: Skin is pale.      Comments: Cool, dry   Neurological:      General: No focal deficit present.      Mental Status: She is oriented to person, place, and time.           Lines/Drains/Airways       Drain  Duration             Female External Urinary Catheter w/ Suction 03/14/25 0215 <1 day              Peripheral Intravenous Line  Duration                  Peripheral IV - Single Lumen 03/13/25 1035 20 G Right Forearm <1 day         Peripheral IV - Single Lumen 03/14/25 0158 20 G No Left Antecubital <1 day         Peripheral IV - Single Lumen 03/14/25 0210 20 G No Right Antecubital <1 day                    Significant Labs:    Lab Results   Component Value Date    WBC 4.47 (L) 03/13/2025    HGB 13.3 03/14/2025    HCT 39.2  "03/14/2025    MCV 90.4 03/13/2025     03/13/2025           BMP  Lab Results   Component Value Date     03/13/2025    K 3.1 (L) 03/13/2025    CO2 23 03/13/2025    BUN 15.0 03/13/2025    CREATININE 0.86 03/13/2025    CALCIUM 9.6 03/13/2025    AGAP 11.0 03/13/2025    ESTGFRAFRICA >60.0 01/05/2022    EGFRNONAA >60.0 01/05/2022         ABG  No results for input(s): "PH", "PO2", "PCO2", "HCO3", "POCBASEDEF" in the last 168 hours.    Mechanical Ventilation Support:         Significant Imaging:  I have reviewed the pertinent imaging within the past 24 hours.    EXAMINATION:  XR CHEST 1 VIEW     CLINICAL HISTORY:  Chest pain, unspecified     TECHNIQUE:  One view     COMPARISON:  March 5, 2025..     FINDINGS:  Cardiopericardial silhouette is within normal limits. Lungs are without dense focal or segmental consolidation, congestive process, pleural effusions or pneumothorax.  Scoliosis and thoracolumbar vertebrae prior fusions.     Impression:     No acute cardiopulmonary process identified.        Electronically signed by:Bradley Granados  Date:                                            03/13/2025  Time:                                           11:06    EXAMINATION:  CT ABDOMEN PELVIS WITH IV CONTRAST     CLINICAL HISTORY:  Abdominal pain, acute, nonlocalized;  chest pain, generalized weakness, difficulty walking.  Abdominal pain     TECHNIQUE:  Helically acquired images with axial, sagittal and coronal reformations were obtained from the lung bases to the pubic symphysis after the IV administration of contrast.     Automated tube current modulation, weight-based exposure dosing, and/or iterative reconstruction technique utilized to reach lowest reasonably achievable exposure rate.     DLP: 699 mGy*cm     COMPARISON:  CT abdomen pelvis 02/05/2025     FINDINGS:  HEART: Normal in size. No pericardial effusion.     LUNG BASES: Well aerated.     LIVER: Normal attenuation. No appreciable focal hepatic lesion.   "   BILIARY: Gallbladder is surgically absent.     PANCREAS: No inflammatory change.     SPLEEN: Normal in size     ADRENALS: No mass.     KIDNEYS/URETERS: The kidneys enhance symmetrically.  No hydronephrosis.     GI TRACT/MESENTERY: There appear to be postsurgical changes related to prior bariatric surgery.  No evidence of bowel obstruction.     Paucity of intra-abdominal fat mildly limits evaluation.     PERITONEUM: No free fluid.No free air.     LYMPH NODES: No enlarged lymph nodes by size criteria.     VASCULATURE: No significant atherosclerosis or aneurysm.     BLADDER: Normal appearance given degree of distention.     REPRODUCTIVE ORGANS: Uterus is surgically absent.     SOFT TISSUES: Unremarkable.     BONES: Postsurgical change the lumbar spinal fusion stabilizing T12 fracture.  Appearance similar to prior.  Degenerative change at the lumbar spine.     Impression:     1. No appreciable acute intra-abdominal abnormality by CT evaluation        Electronically signed by:Nila Pérez  Date:                                            03/13/2025  Time:                                           16:37    START OF REPORT:  Technique: Multiplanar multisequence MRI of the thoracic spine without contrast was performed in neutral position.     Comparison: None.     Clinical History: Myelopathy, acute, thoracic spine.     Findings: The study is severely limited due to motion artifact as well as pronounced metallic artifact related to dorsal spine orthopedic hardware near the thoracolumbar junction.  Spine: There is a incompletely imaged chronic fracture involving the T12 vertebral body with mild retropulsion of the posterior cortex causing mild spinal canal stenosis with no definitive associated cord compression as seen on series 4 image 10. This fracture is stabilized by a posterior fusion hardware seen at T11 down to L1 levels.  Spinal cord: No definitive abnormal cord signal is identified on this limited  study.  Anatomy: Normal.  Bone and marrow degenerative changes: Normal.  bone marrow, and disc integrity: There is no significant disc bulge identified.        Impression:  1. There is a incompletely imaged chronic fracture involving the T12 vertebral body with mild retropulsion of the posterior cortex causing mild spinal canal stenosis with no definitive associated cord compression as seen on series 4 image 10. This fracture is stabilized by a posterior fusion hardware seen at T11 down to L1 levels. Correlate with clinical and laboratory findings regards additional evaluation and follow-up as clinically indicated.  2. No definitive abnormal cord signal is identified on this limited study.  3. Details and other findings as discussed.        This result has not been signed. Information might be incomplete.  Exam Ended: 03/13/25 21:32 CDT Last Resulted: 03/13/25 21:32 CDT    START OF REPORT:  Technique: Standard axial sagittal and coronal lumbar spine MRI sequences were performed without contrast.     Comparison: None.     Clinical history: Low back pain, cauda equina syndrome suspected.     Findings:  Distal cord and conus medullaris: Spinal cord and conus medullaris are unremarkable.  Integrity of the bone, bone marrow and discs:  Bone: There is a chronic appearing coronally oriented fracture involving the T12 vertebral bodystabilized by a posterior fusion hardware at T11 down to L1 levels as detailed in the separate report for the thoracic spine.  Discs: There is multilevel disc desiccation and circumferential disc bulges at L1-L2 down to L4-L5 levels causing varying degrees of moderate to severe bilateral neural foraminal stenosis most severe at the right L3-L4 neural foramen where there is severe stenosis and compression of the exiting nerve root as seen on series 12 image 11-19.        Impression:  1. There is multilevel disc desiccation and circumferential disc bulges at L1-L2 down to L4-L5 levels causing  varying degrees of moderate to severe bilateral neural foraminal stenosis most severe at the right L3-L4 neural foramen where there is severe stenosis and compression of the exiting nerve root as seen on series 12 image 11-19. Correlate with clinical and laboratory findings regards additional evaluation and follow-up.  2. Details and other findings, as described above.        This result has not been signed. Information might be incomplete.  Exam Ended: 03/13/25 21:39 CDT Last Resulted: 03/13/25 21:39 CDT  Assessment/Plan:     Assessment  Hypotension  Hypoglycemia  Hypokalemia  Acute Transaminitis       Plan  Hypotensive in ED, Levophed keep MAP >65, LR @ 100ml/hr   Hypoglycemic in ED at 20, resolved on arrival to ICU at 73, monitor with POCT glucose scheduled, hypoglycemic protocol  Hypokalemia 3.1, given 50meq of potassoium bicarb, repeat CMP potassium 2.9, will give 2x 10meq of KCL, mag 2.0  Echo ordered for hypotension  Recent GI EGD and colonoscopy in 2024 with normal results, patient has repeat scope on 3/21/25 for weakness, nausea, vomiting, and diarrhea  Acute transaminitis, elevated AST/ALT 44/30(3/13) to 422/878(3/14), ->195. Patient denies any hx of IV drug use, denies hx of alcohol use, has chronic hx of opioid use with recent UDS positive for amphetamines(patient on vyvanse), denies recent ingestion of OTC medication or other illicit drugs  CT abdomen pelvis with contrast with no liver lesions, gallbladder surgically removed  Ordered Doppler complete abdomen US  GTT 94, 3 months prior it was 101, Bilirubin 0.3, BUN/Cr 10.9/0.79, Thylenol level normal, CK 40,   Pending Ammonia, Salicylates, Total bilirubin   Cardiology consulted, recs appreciated for new RBBB and hypotension  Neurosurgery consulted for recurrent lower spine pain and acute bilateral lower extremity weakness    DVT Prophylaxis: Lovenox   GI Prophylaxis: Pepcid     32 minutes of critical care was time spent personally by me on the  following activities: development of treatment plan with patient or surrogate and bedside caregivers, discussions with consultants, evaluation of patient's response to treatment, examination of patient, ordering and performing treatments and interventions, ordering and review of laboratory studies, ordering and review of radiographic studies, pulse oximetry, re-evaluation of patient's condition.  This critical care time did not overlap with that of any other provider or involve time for any procedures.     Tyrone Ozuna MD  Pulmonary Critical Care Medicine  [facility]  DOS: 03/14/2025          [1]   Social History  Socioeconomic History    Marital status: Single   Tobacco Use    Smoking status: Never    Smokeless tobacco: Never   Substance and Sexual Activity    Alcohol use: Not Currently    Drug use: Never    Sexual activity: Not Currently     Social Drivers of Health     Financial Resource Strain: Low Risk  (3/7/2025)    Overall Financial Resource Strain (CARDIA)     Difficulty of Paying Living Expenses: Not very hard   Food Insecurity: Food Insecurity Present (3/7/2025)    Hunger Vital Sign     Worried About Running Out of Food in the Last Year: Sometimes true     Ran Out of Food in the Last Year: Sometimes true   Transportation Needs: Unmet Transportation Needs (3/7/2025)    PRAPARE - Transportation     Lack of Transportation (Medical): Yes     Lack of Transportation (Non-Medical): No   Physical Activity: Inactive (3/7/2025)    Exercise Vital Sign     Days of Exercise per Week: 0 days     Minutes of Exercise per Session: 0 min   Stress: Stress Concern Present (3/7/2025)    [employer] [place] [employer] - Occupational Stress Questionnaire     Feeling of Stress : Very much   Housing Stability: Unknown (3/7/2025)    Housing Stability Vital Sign     Unable to Pay for Housing in the Last Year: Patient declined     Homeless in the Last Year: No

## 2025-03-14 NOTE — PLAN OF CARE
[facility] is [facility] on [address]   03/14/25 1608   Discharge Assessment   Assessment Type Discharge Planning Assessment   Confirmed/corrected address, phone number and insurance Yes   Confirmed Demographics Correct on Facesheet   Source of Information family  (Hema Barrera (patient's significant other))   If unable to respond/provide information was family/caregiver contacted? Other (see comments)  (Hema Bruce (S. O.)  [phone])   When was your last doctors appointment?   (PCP is Iliana Merritt MD)   Communicated JASPER with patient/caregiver Date not available/Unable to determine   Reason For Admission Weakness, Chest Pain, Back Pain   People in Home alone   Do you expect to return to your current living situation? Yes   Do you have help at home or someone to help you manage your care at home? No   Prior to hospitilization cognitive status: Unable to Assess   Current cognitive status: Alert/Oriented   Walking or Climbing Stairs Difficulty yes   Walking or Climbing Stairs ambulation difficulty, requires equipment   Mobility Management using A cane to ambulate   Dressing/Bathing Difficulty no   Home Accessibility stairs to enter home   Number of Stairs, Main Entrance five   Surface of Stairs, Main Entrance concrete   Stair Railings, Main Entrance railings safe and in good condition;railings on both sides of stairs   Stairs Comment, Main Entrance lives in [place] home with 5 steps to get to Porch attached to home   Home Layout Able to live on 1st floor   Equipment Currently Used at Home cane, straight  (cane iss borrowed from Sig Other)   Readmission within 30 days? No   Patient currently being followed by outpatient case management? No   Do you currently have service(s) that help you manage your care at home? No   Do you take prescription medications? Yes   Do you have prescription coverage? Yes   Coverage [facility]   Do you have any problems affording any of your  prescribed medications? No   Is the patient taking medications as prescribed? yes   Who is going to help you get home at discharge? sig other Hema Barrera   How do you get to doctors appointments? family or friend will provide   Are you on dialysis? No   Do you take coumadin? No   Discharge Plan A Rehab   Discharge Plan B Home;Home with family;Home Health   DME Needed Upon Discharge  none   Discharge Plan discussed with: Spouse/sig other   Name(s) and Number(s) Hema Barrera [phone]   Transition of Care Barriers None   Physical Activity   On average, how many days per week do you engage in moderate to strenuous exercise (like a brisk walk)? 0 days   On average, how many minutes do you engage in exercise at this level? 0 min   Financial Resource Strain   How hard is it for you to pay for the very basics like food, housing, medical care, and heating? Not hard   Housing Stability   In the last 12 months, was there a time when you were not able to pay the mortgage or rent on time? N   At any time in the past 12 months, were you homeless or living in a shelter (including now)? N   Transportation Needs   Has the lack of transportation kept you from medical appointments, meetings, work or from getting things needed for daily living? No   Food Insecurity   Within the past 12 months, you worried that your food would run out before you got the money to buy more. Never true   Within the past 12 months, the food you bought just didn't last and you didn't have money to get more. Never true   Stress   Do you feel stress - tense, restless, nervous, or anxious, or unable to sleep at night because your mind is troubled all the time - these days? Not at all   Social Isolation   How often do you feel lonely or isolated from those around you?  Never   Alcohol Use   Q1: How often do you have a drink containing alcohol? Never   Q2: How many drinks containing alcohol do you have on a typical day when you are drinking? None   Q3: How  often do you have six or more drinks on one occasion? Never   Utilities   In the past 12 months has the electric, gas, oil, or water company threatened to shut off services in your home? No   Health Literacy   How often do you need to have someone help you when you read instructions, pamphlets, or other written material from your doctor or pharmacy? Never

## 2025-03-14 NOTE — CONSULTS
Inpatient consult to Cardiology  Consult performed by: Yang Austin FNP  Consult ordered by: Allegra Perry MD  Reason for consult: Bradycardia        [facility]    Cardiology  Consult Note    Patient Name: Tayla Lambert  MRN: [id number]  Admission Date: 3/13/2025  Hospital Length of Stay: 0 days  Code Status: Full Code   Attending Provider: Alphonse High MD   Consulting Provider: CATIE Marquez  Primary Care Physician: Iliana Merritt MD  Principal Problem:<principal problem not specified>    Patient information was obtained from patient, past medical records, ER records, and primary team.     Subjective:   Chief Complaint/Reason for Consult: Bradycardia    HPI:   Ms. Lambert is a 49 year old female, unknown to [facility], who presented to the hospital with weakness and CP. Also reported abdominal discomfort. Patient was found to be hypotensive and required Levophed support including ICU Admission. Troponin normal. EKG revealed SR with IRBBB. Tele review revealed SR with occasional blocked PACS. [facility] consulted due to perceived bradycardia, and CP.    PMH: ADHD, Anxiety, Asthma, Depression, DM II, Hypertension, Hypothyroidism, Kidney Stone, Migraine, RA, T 12 Burst Fracture, Tremors of Nervous System, DM II  PSH: Abdominal Surgery, Appendectomy, Back Surgery, , Cholecystectomy, EGD, Gastric Bypass, Hysterectomy, Tonsillectomy  Family History: Father- Hypertension, Mother- DM  Social History: Tobacco- Negative, Alcohol- Negative, Substance Abuse- Negative    Previous Cardiac Diagnostics:   None.    Review of patient's allergies indicates:  No Known Allergies  No current facility-administered medications on file prior to encounter.     Current Outpatient Medications on File Prior to Encounter   Medication Sig    albuterol (PROVENTIL/VENTOLIN HFA) 90 mcg/actuation inhaler Inhale 2 puffs into the lungs every 6 (six) hours as needed for Wheezing. Rescue     cariprazine (VRAYLAR) 1.5 mg Cap Take 1 capsule (1.5 mg total) by mouth every evening.    cetirizine (ZYRTEC) 10 MG tablet Take 1 tablet by mouth every morning.    divalproex ER (DEPAKOTE ER) 250 MG 24 hr tablet Take 1 tablet (250 mg total) by mouth once daily.    ergocalciferol (VITAMIN D2) 50,000 unit Cap Take 50,000 Units by mouth every 7 days.    estradioL (ESTRACE) 1 MG tablet Take 1 tablet (1 mg total) by mouth once daily.    FLUoxetine 20 MG capsule Take 1 capsule by mouth once daily.    fluticasone propionate (FLONASE) 50 mcg/actuation nasal spray 1 spray by Each Nostril route.    gabapentin (NEURONTIN) 300 MG capsule Take 1 capsule (300 mg total) by mouth 3 (three) times daily.    HYDROcodone-acetaminophen (NORCO)  mg per tablet Take 1 tablet by mouth every 6 (six) hours as needed.    hydrOXYzine (ATARAX) 50 MG tablet Take 50 mg by mouth every 6 (six) hours as needed.    hyoscyamine 0.125 mg Subl Place 1 tablet (0.125 mg total) under the tongue every 4 (four) hours as needed (abdominal cramping).    lurasidone (LATUDA) 60 mg Tab tablet Take 1 tablet by mouth once daily.    olmesartan (BENICAR) 40 MG tablet Take 1 tablet (40 mg total) by mouth once daily.    ondansetron (ZOFRAN) 4 MG tablet Take 1 tablet (4 mg total) by mouth every 8 (eight) hours as needed for Nausea.    pantoprazole (PROTONIX) 40 MG tablet Take 1 tablet (40 mg total) by mouth 2 (two) times daily for 56 days, THEN 1 tablet (40 mg total) once daily.    polyethylene glycol (GLYCOLAX) 17 gram PwPk Take 17 g by mouth once daily.    promethazine (PHENERGAN) 25 MG tablet Take 1 tablet by mouth every 4 (four) hours.    temazepam (RESTORIL) 15 mg Cap Take 1 capsule (15 mg total) by mouth nightly as needed (insomnia).    tirzepatide 15 mg/0.5 mL PnIj Inject 15 mg into the skin every 7 days.    triamcinolone acetonide 0.1% (KENALOG) 0.1 % cream Apply topically 2 (two) times daily. For eczema flares. Do not use for more than 14 days at a time.     TRINTELLIX 20 mg Tab Take 1 tablet (20 mg total) by mouth every evening.    valACYclovir (VALTREX) 1000 MG tablet Take 1 tablet (1,000 mg total) by mouth 2 (two) times daily. For 5 days with outbreaks.    VYVANSE 70 mg capsule Take 1 capsule (70 mg total) by mouth every morning.     Review of Systems   Unable to perform ROS: Acuity of condition     Objective:     Vital Signs (Most Recent):  Temp: 98.1 °F (36.7 °C) (03/14/25 0800)  Pulse: 72 (03/14/25 1100)  Resp: 10 (03/14/25 1100)  BP: 108/69 (03/14/25 1100)  SpO2: 95 % (03/14/25 1100) Vital Signs (24h Range):  Temp:  [97.5 °F (36.4 °C)-98.1 °F (36.7 °C)] 98.1 °F (36.7 °C)  Pulse:  [56-86] 72  Resp:  [10-20] 10  SpO2:  [94 %-100 %] 95 %  BP: ()/(46-87) 108/69   Weight: 69.5 kg (153 lb 3.2 oz)  Body mass index is 25.49 kg/m².  SpO2: 95 %       Intake/Output Summary (Last 24 hours) at 3/14/2025 1130  Last data filed at 3/14/2025 0819  Gross per 24 hour   Intake 2730.21 ml   Output 500 ml   Net 2230.21 ml     Lines/Drains/Airways       Drain  Duration             Female External Urinary Catheter w/ Suction 03/14/25 0215 <1 day              Peripheral Intravenous Line  Duration                  Peripheral IV - Single Lumen 03/14/25 0545 20 G 1 1/4 in Anterior;Left Upper Arm <1 day         Peripheral IV - Single Lumen 03/14/25 0808 18 G Anterior;Right Forearm <1 day                  Significant Labs:   Chemistries:   Recent Labs   Lab 03/13/25  1038 03/14/25  0417    142   K 3.1* 2.9*    110*   CO2 23 25   BUN 15.0 10.9   CREATININE 0.86 0.79   CALCIUM 9.6 8.2*   BILITOT 0.3 0.3   ALKPHOS 121 195*   ALT 44 422*   AST 30 878*   GLUCOSE 78 104*   MG 2.20 2.00   PHOS  --  3.3   TROPONINI <0.010  --         CBC/Anemia Labs: Coags:    Recent Labs   Lab 03/13/25  1038 03/14/25  0158 03/14/25  0417 03/14/25  0606   WBC 4.47*  --   --  4.22*   HGB 14.4 13.3  --  12.3   HCT 42.2 39.2  --  37.3     --   --  294   MCV 90.4  --   --  92.3   RDW 12.9  --    --  13.1   IRON 50  --   --   --    FERRITIN 35.60  --   --   --    FOLATE  --   --  12.4  --    [id number]  --   --  >2,000*  --     Recent Labs   Lab 03/14/25  0828   INR 1.0        Significant Imaging:  Imaging Results              MRI Lumbar Spine Without Contrast (Final result)  Result time 03/14/25 11:03:42      Final result by Thelma Leroy MD (03/14/25 11:03:42)                   Impression:      1. Mild degenerative narrowing the spinal canal at L1-L5.  2. Multilevel neural foraminal stenoses as described.  No significant change from the [facility] interpretation.      Electronically signed by: Thelma Leroy  Date:    03/14/2025  Time:    11:03               Narrative:    EXAMINATION:  MRI LUMBAR SPINE WITHOUT CONTRAST    CLINICAL HISTORY:  Low back pain, cauda equina syndrome suspected;    TECHNIQUE:  Multiplanar multisequence MR images of the lumbar spine are obtained without contrast.    COMPARISON:  CT abdomen pelvis dated 03/13/2025    FINDINGS:  There are postoperative changes posterior spinal fusion hardware at T11 through L1, with chronic T12 compression deformity.  There is a leftward curvature of the lumbar spine with grade 1 retrolisthesis of L2 and L3.  The lumbar vertebral body heights are preserved.  There are degenerative endplate changes with reactive endplate edema at L3-L4.    The conus terminates at the level of L1.  It is normal in signal and contour.  There is no epidural fluid collection.    Disc spaces, spinal canal and neural foramina are as follows:    L1-L2: Disc bulge and facet hypertrophy with mild narrowing of the spinal canal.  Mild left neural foraminal stenosis.    L2-L3: Grade 1 retrolisthesis of L2 over L3 with disc bulge, facet hypertrophy and mild narrowing of the spinal canal.  Mild left neural foraminal stenosis.    L3-L4: Grade 1 retrolisthesis of L3 over L4 with disc bulge, facet hypertrophy and mild narrowing of the spinal canal.  Moderate right and mild  left neural foraminal stenosis.    L4-L5: Disc bulge and facet hypertrophy with mild narrowing of the spinal canal.  Mild bilateral neural foraminal stenosis.    L5-S1: No disc herniation.  Bilateral facet hypertrophy.  No significant spinal canal or neural foraminal stenosis.    No significant abnormality within the visualized paraspinous musculature.                        Preliminary result by Luiz Arita MD (03/13/25 21:39:05)                   Impression:    1. There is multilevel disc desiccation and circumferential disc bulges at L1-L2 down to L4-L5 levels causing varying degrees of moderate to severe bilateral neural foraminal stenosis most severe at the right L3-L4 neural foramen where there is severe stenosis and compression of the exiting nerve root as seen on series 12 image 11-19. Correlate with clinical and laboratory findings regards additional evaluation and follow-up.  2. Details and other findings, as described above.               Narrative:    START OF REPORT:  Technique: Standard axial sagittal and coronal lumbar spine MRI sequences were performed without contrast.    Comparison: None.    Clinical history: Low back pain, cauda equina syndrome suspected.    Findings:  Distal cord and conus medullaris: Spinal cord and conus medullaris are unremarkable.  Integrity of the bone, bone marrow and discs:  Bone: There is a chronic appearing coronally oriented fracture involving the T12 vertebral bodystabilized by a posterior fusion hardware at T11 down to L1 levels as detailed in the separate report for the thoracic spine.  Discs: There is multilevel disc desiccation and circumferential disc bulges at L1-L2 down to L4-L5 levels causing varying degrees of moderate to severe bilateral neural foraminal stenosis most severe at the right L3-L4 neural foramen where there is severe stenosis and compression of the exiting nerve root as seen on series 12 image 11-19.                                          MRI Thoracic Spine Without Contrast (Final result)  Result time 03/14/25 11:14:52      Final result by Thelma Leroy MD (03/14/25 11:14:52)                   Impression:      1. No cord signal abnormality.  2. Chronic T12 compression deformity with loss of height, bony retropulsion and mild narrowing of the spinal canal.  3. Small central disc protrusion at T9-T10 with minimal narrowing of the canal.  No significant change from the [facility] interpretation      Electronically signed by: Thelma Leroy  Date:    03/14/2025  Time:    11:14               Narrative:    EXAMINATION:  MRI THORACIC SPINE WITHOUT CONTRAST    CLINICAL HISTORY:  Myelopathy, acute, thoracic spine;    TECHNIQUE:  Multiplanar multisequence MR images of the thoracic spine are obtained without contrast.    COMPARISON:  X-rays dated 06/03/2018    FINDINGS:  There is a rightward curvature of the thoracic spine there are postoperative changes posterior spinal fusion hardware at T11-L1.  There is a chronic T12 compression deformity with mild loss of height.  There is approximately 4 mm of bony retropulsion with mild narrowing of the spinal canal.  The remaining vertebral body heights are preserved.  There is no bone marrow edema.    There is no cord signal abnormality.  There is no epidural fluid collection.    Central disc protrusion at T9-T10 measures 3 mm in AP dimension with minimal narrowing of the spinal canal.  The spinal canal and neural foramina are otherwise patent.    There are postoperative changes the paraspinal soft tissues.  The soft tissues are otherwise unremarkable.                        Preliminary result by Luiz Arita MD (03/13/25 21:32:26)                   Impression:    1. There is a incompletely imaged chronic fracture involving the T12 vertebral body with mild retropulsion of the posterior cortex causing mild spinal canal stenosis with no definitive associated cord compression as seen on series 4 image 10. This  fracture is stabilized by a posterior fusion hardware seen at T11 down to L1 levels. Correlate with clinical and laboratory findings regards additional evaluation and follow-up as clinically indicated.  2. No definitive abnormal cord signal is identified on this limited study.  3. Details and other findings as discussed.               Narrative:    START OF REPORT:  Technique: Multiplanar multisequence MRI of the thoracic spine without contrast was performed in neutral position.    Comparison: None.    Clinical History: Myelopathy, acute, thoracic spine.    Findings: The study is severely limited due to motion artifact as well as pronounced metallic artifact related to dorsal spine orthopedic hardware near the thoracolumbar junction.  Spine: There is a incompletely imaged chronic fracture involving the T12 vertebral body with mild retropulsion of the posterior cortex causing mild spinal canal stenosis with no definitive associated cord compression as seen on series 4 image 10. This fracture is stabilized by a posterior fusion hardware seen at T11 down to L1 levels.  Spinal cord: No definitive abnormal cord signal is identified on this limited study.  Anatomy: Normal.  Bone and marrow degenerative changes: Normal.  bone marrow, and disc integrity: There is no significant disc bulge identified.                                         CT Abdomen Pelvis With IV Contrast NO Oral Contrast (Final result)  Result time 03/13/25 16:37:03      Final result by Nila Pérez MD (03/13/25 16:37:03)                   Impression:      1. No appreciable acute intra-abdominal abnormality by CT evaluation      Electronically signed by: Nila Pérez  Date:    03/13/2025  Time:    16:37               Narrative:    EXAMINATION:  CT ABDOMEN PELVIS WITH IV CONTRAST    CLINICAL HISTORY:  Abdominal pain, acute, nonlocalized;  chest pain, generalized weakness, difficulty walking.  Abdominal pain    TECHNIQUE:  Helically acquired  images with axial, sagittal and coronal reformations were obtained from the lung bases to the pubic symphysis after the IV administration of contrast.    Automated tube current modulation, weight-based exposure dosing, and/or iterative reconstruction technique utilized to reach lowest reasonably achievable exposure rate.    DLP: 699 mGy*cm    COMPARISON:  CT abdomen pelvis 02/05/2025    FINDINGS:  HEART: Normal in size. No pericardial effusion.    LUNG BASES: Well aerated.    LIVER: Normal attenuation. No appreciable focal hepatic lesion.    BILIARY: Gallbladder is surgically absent.    PANCREAS: No inflammatory change.    SPLEEN: Normal in size    ADRENALS: No mass.    KIDNEYS/URETERS: The kidneys enhance symmetrically.  No hydronephrosis.    GI TRACT/MESENTERY: There appear to be postsurgical changes related to prior bariatric surgery.  No evidence of bowel obstruction.     Paucity of intra-abdominal fat mildly limits evaluation.    PERITONEUM: No free fluid.No free air.    LYMPH NODES: No enlarged lymph nodes by size criteria.    VASCULATURE: No significant atherosclerosis or aneurysm.    BLADDER: Normal appearance given degree of distention.    REPRODUCTIVE ORGANS: Uterus is surgically absent.    SOFT TISSUES: Unremarkable.    BONES: Postsurgical change the lumbar spinal fusion stabilizing T12 fracture.  Appearance similar to prior.  Degenerative change at the lumbar spine.                                       X-Ray Chest 1 View (Final result)  Result time 03/13/25 11:06:54      Final result by Bradley Granados MD (03/13/25 11:06:54)                   Impression:      No acute cardiopulmonary process identified.      Electronically signed by: Bradley Granados  Date:    03/13/2025  Time:    11:06               Narrative:    EXAMINATION:  XR CHEST 1 VIEW    CLINICAL HISTORY:  Chest pain, unspecified    TECHNIQUE:  One view    COMPARISON:  March 5, 2025..    FINDINGS:  Cardiopericardial silhouette is within normal  limits. Lungs are without dense focal or segmental consolidation, congestive process, pleural effusions or pneumothorax.  Scoliosis and thoracolumbar vertebrae prior fusions.                                    EKG:       Telemetry:  SR    Physical Exam  Vitals and nursing note reviewed.   Constitutional:       General: She is not in acute distress.  HENT:      Mouth/Throat:      Mouth: Mucous membranes are moist.   Cardiovascular:      Rate and Rhythm: Normal rate and regular rhythm.   Pulmonary:      Effort: Pulmonary effort is normal. No respiratory distress.   Abdominal:      Palpations: Abdomen is soft.   Skin:     General: Skin is warm and dry.   Neurological:      Comments: Drowsy       Home Medications:   Medications Ordered Prior to Encounter[1]  Current Schedule Inpatient Medications:   [START ON 3/15/2025] enoxparin  40 mg Subcutaneous Daily    famotidine  20 mg Oral BID    hydrocortisone sodium succinate  100 mg Intravenous Q8H    mupirocin   Nasal BID     Continuous Infusions:   lactated ringers   Intravenous Continuous 100 mL/hr at 03/14/25 0641 Rate Verify at 03/14/25 0641    NORepinephrine bitartrate-D5W  0-3 mcg/kg/min (Dosing Weight) Intravenous Continuous 7.8 mL/hr at 03/14/25 0641 0.06 mcg/kg/min at 03/14/25 0641     Assessment:   Hypotension    - Requiring Vasopressor Support    - History Hypertension  Incomplete Right Bundle Branch Block (Tele Reviewed blocked PACS with SR)  Hypokalemia  DM II    - Had some Hypoglycemia  Elevated Liver Enzymes  Anxiety/Depression  Migraine Headaches   Hypothyroidism  History of Renal Stone    Plan:   Check Echo  Trend Troponin Values    Thank you for your consult.     CATIE Marquez  Cardiology  [facility]     I agree with the findings of the complexity of problems addressed and take responsibility for the plan's risks and complications. I approved the plan documented by Yang Austin NP.            [1]   No current facility-administered  medications on file prior to encounter.     Current Outpatient Medications on File Prior to Encounter   Medication Sig Dispense Refill    albuterol (PROVENTIL/VENTOLIN HFA) 90 mcg/actuation inhaler Inhale 2 puffs into the lungs every 6 (six) hours as needed for Wheezing. Rescue 18 g 5    cariprazine (VRAYLAR) 1.5 mg Cap Take 1 capsule (1.5 mg total) by mouth every evening. 30 capsule 1    cetirizine (ZYRTEC) 10 MG tablet Take 1 tablet by mouth every morning.      divalproex ER (DEPAKOTE ER) 250 MG 24 hr tablet Take 1 tablet (250 mg total) by mouth once daily. 30 tablet 0    ergocalciferol (VITAMIN D2) 50,000 unit Cap Take 50,000 Units by mouth every 7 days.      estradioL (ESTRACE) 1 MG tablet Take 1 tablet (1 mg total) by mouth once daily. 30 tablet 0    FLUoxetine 20 MG capsule Take 1 capsule by mouth once daily.      fluticasone propionate (FLONASE) 50 mcg/actuation nasal spray 1 spray by Each Nostril route.      gabapentin (NEURONTIN) 300 MG capsule Take 1 capsule (300 mg total) by mouth 3 (three) times daily. 90 capsule 1    HYDROcodone-acetaminophen (NORCO)  mg per tablet Take 1 tablet by mouth every 6 (six) hours as needed. 12 tablet 0    hydrOXYzine (ATARAX) 50 MG tablet Take 50 mg by mouth every 6 (six) hours as needed.      hyoscyamine 0.125 mg Subl Place 1 tablet (0.125 mg total) under the tongue every 4 (four) hours as needed (abdominal cramping). 60 tablet 4    lurasidone (LATUDA) 60 mg Tab tablet Take 1 tablet by mouth once daily.      olmesartan (BENICAR) 40 MG tablet Take 1 tablet (40 mg total) by mouth once daily. 90 tablet 3    ondansetron (ZOFRAN) 4 MG tablet Take 1 tablet (4 mg total) by mouth every 8 (eight) hours as needed for Nausea. 16 tablet 0    pantoprazole (PROTONIX) 40 MG tablet Take 1 tablet (40 mg total) by mouth 2 (two) times daily for 56 days, THEN 1 tablet (40 mg total) once daily. 30 tablet 2    polyethylene glycol (GLYCOLAX) 17 gram PwPk Take 17 g by mouth once daily. 30  packet 11    promethazine (PHENERGAN) 25 MG tablet Take 1 tablet by mouth every 4 (four) hours.      temazepam (RESTORIL) 15 mg Cap Take 1 capsule (15 mg total) by mouth nightly as needed (insomnia). 30 capsule 0    tirzepatide 15 mg/0.5 mL PnIj Inject 15 mg into the skin every 7 days. 4 Pen 3    triamcinolone acetonide 0.1% (KENALOG) 0.1 % cream Apply topically 2 (two) times daily. For eczema flares. Do not use for more than 14 days at a time. 80 g 2    TRINTELLIX 20 mg Tab Take 1 tablet (20 mg total) by mouth every evening. 30 tablet 2    valACYclovir (VALTREX) 1000 MG tablet Take 1 tablet (1,000 mg total) by mouth 2 (two) times daily. For 5 days with outbreaks. 60 tablet 2    VYVANSE 70 mg capsule Take 1 capsule (70 mg total) by mouth every morning. 30 capsule 0

## 2025-03-14 NOTE — PROGRESS NOTES
Inpatient Nutrition Assessment    Admit Date: 3/13/2025   Total duration of encounter: 1 day   Patient Age: 49 y.o.    Nutrition Recommendation/Prescription     Continue Diet Bariatric Regular   Add Boost Breeze (provides 250 kcal, 9 g protein per serving) TID.  May also need to consider additional MVI and thiamine per MD.      Communication of Recommendations: reviewed with nurse    Nutrition Assessment     Malnutrition Assessment/Nutrition-Focused Physical Exam    Malnutrition Context: chronic illness (03/14/25 1318)  Malnutrition Level: moderate (03/14/25 1318)  Energy Intake (Malnutrition): other (see comments) (Unable to assess) (03/14/25 1318)  Weight Loss (Malnutrition): greater than 20% in 1 year (03/14/25 1318)  Subcutaneous Fat (Malnutrition): moderate depletion (03/14/25 1318)  Orbital Region (Subcutaneous Fat Loss): moderate depletion        Muscle Mass (Malnutrition): moderate depletion (03/14/25 1318)  Zoroastrianism Region (Muscle Loss): moderate depletion  Clavicle Bone Region (Muscle Loss): moderate depletion                    Fluid Accumulation (Malnutrition): other (see comments) (Not present) (03/14/25 1318)        A minimum of two characteristics is recommended for diagnosis of either severe or non-severe malnutrition.    Chart Review    Reason Seen: continuous nutrition monitoring and physician consult for nutrition assessment    Malnutrition Screening Tool Results              Diagnosis:  Hypotension  Hypoglycemia  Hypokalemia  Acute Transaminitis      Relevant Medical History:   Past Medical History:   Diagnosis Date    ADHD (attention deficit hyperactivity disorder)      Anxiety      Asthma      Depression      Diabetes mellitus      Encounter for blood transfusion      Essential hypertension 02/22/2022    Hypothyroidism 02/04/2020    Kidney stone 02/08/2022    Migraine without status migrainosus, not intractable 02/04/2020    Rheumatoid arthritis involving both hands with positive rheumatoid  factor 2018    T12 burst fracture      Tremors of nervous system 2020    Type 2 diabetes mellitus 2024               Past Surgical History:   Procedure Laterality Date    ABDOMINAL SURGERY        APPENDECTOMY        BACK SURGERY   2018     T11 to L1 PSF w/T12 laminectomy     SECTION       CHOLECYSTECTOMY        ESOPHAGOGASTRODUODENOSCOPY N/A 12/10/2024     Procedure: EGD (ESOPHAGOGASTRODUODENOSCOPY);  Surgeon: Hua Ibarra MD;  Location: [address]);  Service: Gastroenterology;  Laterality: N/A;    GASTRIC BYPASS       HYSTERECTOMY        TONSILLECTOMY         Scheduled Medications:  [START ON 3/15/2025] enoxparin, 40 mg, Daily  famotidine, 20 mg, Daily  hydrocortisone sodium succinate, 100 mg, Q8H  [START ON 3/15/2025] levothyroxine, 125 mcg, Before breakfast  lisdexamfetamine, 70 mg, Daily  mupirocin, , BID    Continuous Infusions:  lactated ringers, Last Rate: 100 mL/hr at 25 06  NORepinephrine bitartrate-D5W, Last Rate: 0.06 mcg/kg/min (25 0641)    PRN Medications:  sodium chloride 0.9%, 10 mL, PRN    Calorie Containing IV Medications: no significant kcals from medications at this time    Recent Labs   Lab 25  1038 25  0158 25  0417 25  0606 25  1207     --  142  --   --    K 3.1*  --  2.9*  --  3.8   CALCIUM 9.6  --  8.2*  --   --    PHOS  --   --  3.3  --   --    MG 2.20  --  2.00  --   --      --  110*  --   --    CO2 23  --  25  --   --    BUN 15.0  --  10.9  --   --    CREATININE 0.86  --  0.79  --   --    EGFRNORACEVR >60  --  >60  --   --    GLUCOSE 78  --  104*  --   --    BILITOT 0.3  --  0.3  --   --    ALKPHOS 121  --  195*  --   --    ALT 44  --  422*  --   --    AST 30  --  878*  --   --    ALBUMIN 4.1  --  3.0*  --   --    CRP  --   --  1.00  --   --    AMMONIA  --   --   --  27.4  --    LIPASE 16  --   --   --   --    WBC 4.47*  --   --  4.22*  --    HGB 14.4 13.3  --  12.3  --    HCT 42.2 39.2   "--  37.3  --      Nutrition Orders:  Diet Bariatric Regular      Appetite/Oral Intake: NPO/NPO  Factors Affecting Nutritional Intake: NPO  Social Needs Impacting Access to Food: unable to assess at this time; will attempt on follow-up  Food/Anglican/Cultural Preferences: unable to obtain  Food Allergies: no known food allergies  Last Bowel Movement: 25  Wound(s):  None documented     Comments    3/14/25: Noted hx of gastric bypass in . Attempted to verify subjective info with pt, pt sleeping soundly, did not awaken to RD touch/voice. Noted vomiting and diarrhea with eating x3 years per MD notes. Per RN, pt stated has not been that long. Noted wt loss compared with previous EMR wts. Agreeable to clear ONS at that time. Will add. Noted diet order, due to length of time since surgery, not necessary; however, noted previous RD notes recommended small frequent meals due to N/V. May benefit pt at this time also.    Anthropometrics    Height: 5' 5" (165.1 cm), Height Method: Stated  Last Weight: 69.5 kg (153 lb 3.5 oz) (25 1317), Weight Method: Bed Scale  BMI (Calculated): 25.5  BMI Classification: overweight (BMI 25-29.9)     Ideal Body Weight (IBW), Female: 125 lb     % Ideal Body Weight, Female (lb): 122.56 %                    Usual Body Weight (UBW), k.4 kg  % Usual Body Weight: 77.9  % Weight Change From Usual Weight: -22.26 %  Usual Weight Provided By: EMR weight history    Wt Readings from Last 5 Encounters:   25 69.5 kg (153 lb 3.5 oz)   25 64.4 kg (142 lb)   25 64.9 kg (143 lb)   25 68.5 kg (151 lb)   25 63.5 kg (140 lb)     Weight Change(s) Since Admission:   Wt Readings from Last 1 Encounters:   25 1317 69.5 kg (153 lb 3.5 oz)   25 0345 69.5 kg (153 lb 3.2 oz)   25 1003 54.4 kg (120 lb)   Admit Weight: 54.4 kg (120 lb) (25 1003), Weight Method: Stated    Estimated Needs    Weight Used For Calorie Calculations: 69.5 kg (153 lb 3.5 " oz)  Energy Calorie Requirements (kcal): 1965kcal (1.5 stress factor)  Energy Need Method: [place]  Weight Used For Protein Calculations: 69.5 kg (153 lb 3.5 oz)  Protein Requirements: 84-98gm (1.2-1.4g/kg)  Fluid Requirements (mL): 1965ml (1ml/kcal)  CHO Requirement: 220gm (45% est kcal needs)     Enteral Nutrition     Patient not receiving enteral nutrition at this time.    Parenteral Nutrition     Patient not receiving parenteral nutrition support at this time.    Evaluation of Received Nutrient Intake    Calories: not meeting estimated needs  Protein: not meeting estimated needs    Patient Education     Not applicable.    Nutrition Diagnosis     PES: Moderate chronic disease or condition related malnutrition Related to chronic condition As Evidenced by:  - weight loss: > 20% in 1 year - muscle mass depletion: 4 areas of moderate muscle loss (Temporalis, Trapezius, Pectoralis, Clavicle) - loss of subcutaneous fat: 2 areas of moderate fat loss (Infraorbital, Buccal) active    Nutrition Interventions     Intervention(s): general/healthful diet, commercial beverage, and collaboration with other providers  Intervention(s):      Goal: Meet greater than 80% of nutritional needs by follow-up. (new)  Goal: Consume % of oral supplements by follow-up. (new)    Nutrition Goals & Monitoring     Dietitian will monitor: food and beverage intake and energy intake  Discharge planning: resume home regimen  Nutrition Risk/Follow-Up: moderate (follow-up in 3-5 days)   Please consult if re-assessment needed sooner.

## 2025-03-14 NOTE — CONSULTS
[facility]  Neurosurgery  Consult Note    Inpatient consult to Neurosurgery  Consult performed by: Cali Wise AGACNP-BC  Consult ordered by: Allegra Perry MD        Subjective:     Chief Complaint/Reason for Admission: weakness    History of Present Illness:  This is a 49-year-old  female with past medical history significant for hypothyroidism, diabetes, hypertension, history of gastric bypass in the 90s, history of multiple back surgeries presented to the ED for 2 days of weakness where her spouse was having to help her up out of bed, chest pain, abdominal pain.  She endorses daily vomiting and chronic diarrhea for 3 years.  She has had back surgery in [place] but details are limited.     CT abdomen and pelvis performed in ED with no acute abnormalities, MRI of thoracic and lumbar spine with posterior fusion of T11-L1 and moderate to severe bilateral neural foramen stenosis.     Patient was noted to be hypotensive 70s over 40s, received fluid bolus and vasopressors.  Patient also with hypoglycemic episodes with CABG in the 20s given D50.  Patient was admitted to ICU.    Patient with previous admissions in the past with poor oral intake and polypharmacy.    MRI lumbar spine without contrast 03/13/2025: 1. There is multilevel disc desiccation and circumferential disc bulges at L1-L2 down to L4-L5 levels causing varying degrees of moderate to severe bilateral neural foraminal stenosis most severe at the right L3-L4 neural foramen where there is severe stenosis and compression of the exiting nerve root as seen on series 12 image 11-19. Correlate with clinical and laboratory findings regards additional evaluation and follow-up.   2. Details and other findings, as described above.       MRI thoracic spine without contrast 03/13/2025:Impression:   1. There is a incompletely imaged chronic fracture involving the T12 vertebral body with mild retropulsion of the  posterior cortex causing mild spinal canal stenosis with no definitive associated cord compression as seen on series 4 image 10. This fracture is stabilized by a posterior fusion hardware seen at T11 down to L1 levels. Correlate with clinical and laboratory findings regards additional evaluation and follow-up as clinically indicated.   2. No definitive abnormal cord signal is identified on this limited study.       On physical exam patient is difficult to arouse and is lethargic/encephalopathic?  Eventually when she did arouse she told me that her surgery took place in [place].  She does not readily participate with exam but nursing reports that they see her using her phone at times.  She did raise her arms into the air but did not participate in individual muscle group testing.  She moves her legs spontaneously but not to command.  Very difficult to get an exam on her lower extremities.  There is no saddle anesthesia paresthesias.  She does have lower back pain.      PTA Medications   Medication Sig    albuterol (PROVENTIL/VENTOLIN HFA) 90 mcg/actuation inhaler Inhale 2 puffs into the lungs every 6 (six) hours as needed for Wheezing. Rescue    cariprazine (VRAYLAR) 1.5 mg Cap Take 1 capsule (1.5 mg total) by mouth every evening.    cetirizine (ZYRTEC) 10 MG tablet Take 1 tablet by mouth every morning.    divalproex ER (DEPAKOTE ER) 250 MG 24 hr tablet Take 1 tablet (250 mg total) by mouth once daily.    ergocalciferol (VITAMIN D2) 50,000 unit Cap Take 50,000 Units by mouth every 7 days.    estradioL (ESTRACE) 1 MG tablet Take 1 tablet (1 mg total) by mouth once daily.    FLUoxetine 20 MG capsule Take 1 capsule by mouth once daily.    fluticasone propionate (FLONASE) 50 mcg/actuation nasal spray 1 spray by Each Nostril route.    gabapentin (NEURONTIN) 300 MG capsule Take 1 capsule (300 mg total) by mouth 3 (three) times daily.    HYDROcodone-acetaminophen (NORCO)  mg per tablet Take 1 tablet by mouth every  6 (six) hours as needed.    hydrOXYzine (ATARAX) 50 MG tablet Take 50 mg by mouth every 6 (six) hours as needed.    hyoscyamine 0.125 mg Subl Place 1 tablet (0.125 mg total) under the tongue every 4 (four) hours as needed (abdominal cramping).    lurasidone (LATUDA) 60 mg Tab tablet Take 1 tablet by mouth once daily.    olmesartan (BENICAR) 40 MG tablet Take 1 tablet (40 mg total) by mouth once daily.    ondansetron (ZOFRAN) 4 MG tablet Take 1 tablet (4 mg total) by mouth every 8 (eight) hours as needed for Nausea.    pantoprazole (PROTONIX) 40 MG tablet Take 1 tablet (40 mg total) by mouth 2 (two) times daily for 56 days, THEN 1 tablet (40 mg total) once daily.    polyethylene glycol (GLYCOLAX) 17 gram PwPk Take 17 g by mouth once daily.    promethazine (PHENERGAN) 25 MG tablet Take 1 tablet by mouth every 4 (four) hours.    temazepam (RESTORIL) 15 mg Cap Take 1 capsule (15 mg total) by mouth nightly as needed (insomnia).    tirzepatide 15 mg/0.5 mL PnIj Inject 15 mg into the skin every 7 days.    triamcinolone acetonide 0.1% (KENALOG) 0.1 % cream Apply topically 2 (two) times daily. For eczema flares. Do not use for more than 14 days at a time.    TRINTELLIX 20 mg Tab Take 1 tablet (20 mg total) by mouth every evening.    valACYclovir (VALTREX) 1000 MG tablet Take 1 tablet (1,000 mg total) by mouth 2 (two) times daily. For 5 days with outbreaks.    VYVANSE 70 mg capsule Take 1 capsule (70 mg total) by mouth every morning.       Review of patient's allergies indicates:  No Known Allergies    Past Medical History:   Diagnosis Date    ADHD (attention deficit hyperactivity disorder)     Anxiety     Asthma     Depression     Diabetes mellitus     Encounter for blood transfusion     Essential hypertension 02/22/2022    Hypothyroidism 02/04/2020    Kidney stone 02/08/2022    Migraine without status migrainosus, not intractable 02/04/2020    Rheumatoid arthritis involving both hands with positive rheumatoid factor  2018    T12 burst fracture     Tremors of nervous system 2020    Type 2 diabetes mellitus 2024     Past Surgical History:   Procedure Laterality Date    ABDOMINAL SURGERY      APPENDECTOMY      BACK SURGERY  2018    T11 to L1 PSF w/T12 laminectomy     SECTION      CHOLECYSTECTOMY      ESOPHAGOGASTRODUODENOSCOPY N/A 12/10/2024    Procedure: EGD (ESOPHAGOGASTRODUODENOSCOPY);  Surgeon: Hua Ibarra MD;  Location: [address]);  Service: Gastroenterology;  Laterality: N/A;    GASTRIC BYPASS      HYSTERECTOMY      TONSILLECTOMY       Family History       Problem Relation (Age of Onset)    Breast cancer Maternal Aunt, Maternal Aunt, Maternal Grandmother    Diabetes Mother    Hypertension Father    Tremor Father, Brother          Tobacco Use    Smoking status: Never    Smokeless tobacco: Never   Substance and Sexual Activity    Alcohol use: Not Currently    Drug use: Never    Sexual activity: Not Currently     Review of Systems   Constitutional:         Lower back pain  Generalized weakness for several days     Objective:     Weight: 69.5 kg (153 lb 3.2 oz)  Body mass index is 25.49 kg/m².  Vital Signs (Most Recent):  Temp: 98.1 °F (36.7 °C) (25 0800)  Pulse: 67 (25 0930)  Resp: 13 (25 0930)  BP: (!) 96/57 (25 0930)  SpO2: 95 % (25 09) Vital Signs (24h Range):  Temp:  [97.5 °F (36.4 °C)-98.1 °F (36.7 °C)] 98.1 °F (36.7 °C)  Pulse:  [56-86] 67  Resp:  [10-20] 13  SpO2:  [94 %-100 %] 95 %  BP: ()/(46-87) 96/57     Date 25 0700 - 03/15/25 0659   Shift 0543-0074 1883-4304 3301-1858 24 Hour Total   INTAKE   Shift Total(mL/kg)       OUTPUT   Urine(mL/kg/hr) 500   500   Shift Total(mL/kg) 500(7.2)   500(7.2)   Weight (kg) 69.5 69.5 69.5 69.5                       Female External Urinary Catheter w/ Suction 25 0215 (Active)   Skin no redness;no breakdown;perineum cleansed w/ soap and water 25 0400   Tolerance no signs/symptoms of  discomfort 03/14/25 0400   Suction Continuous suction at 70 mmHg 03/14/25 0400   Date of last wick change 03/14/25 03/14/25 0400   Time of last wick change 0430 03/14/25 0400   Output (mL) 500 mL 03/14/25 0819       Physical Exam:  Nursing note and vitals reviewed.    Constitutional: She appears well-developed. She is not diaphoretic. She appears distressed.     Eyes: Pupils are equal, round, and reactive to light. Conjunctivae and EOM are normal.     Cardiovascular: Normal rate.     Abdominal: Soft.     Skin: Skin displays no rash on trunk. Skin displays no lesions on trunk.     Psych/Behavior:   Lethargic/encephalopathic     Musculoskeletal:        Right Upper Extremities: Range of motion is limited. Muscle strength is 4/5.        Left Upper Extremities: Range of motion is limited. Muscle strength is 4/5.       Right Lower Extremities: Range of motion is limited. Muscle strength is 2/5.        Left Lower Extremities: Range of motion is limited. Muscle strength is 2/5.      Comments: No saddle anesthesia  No paresthesias    No Vicenta or clonus    Difficult to obtain individual muscle group testing.  She did raise both arms into the air briefly  Spontaneous movement to bilateral lower extremities but did not participate with muscle group testing.     Neurological:   GCS 14 for lethargy-difficult to get to participate in exam/inattentive  PERRLA bilateral  Wakes and answers situational questions  Follows commands intermittently  No facial droop, no speech issues.    Generalized weakness present very difficult to obtain individual muscle group testing due to her ability to follow commands and participate.  She is inattentive at times.  No gross visual issues.    Cranial nerves grossly intact            Significant Labs:  Recent Labs   Lab 03/13/25  1038 03/14/25  0417    142   K 3.1* 2.9*    110*   CO2 23 25   BUN 15.0 10.9   CREATININE 0.86 0.79   CALCIUM 9.6 8.2*   MG 2.20 2.00     Recent Labs   Lab  03/13/25  1038 03/14/25 0158 03/14/25  0606   WBC 4.47*  --  4.22*   HGB 14.4 13.3 12.3   HCT 42.2 39.2 37.3     --  294     Recent Labs   Lab 03/14/25  0828   INR 1.0     Microbiology Results (last 7 days)       Procedure Component Value Units Date/Time    Blood culture #1 **CANNOT BE ORDERED STAT** [[id number]] Collected: 03/14/25 0158    Order Status: Resulted Specimen: Blood Updated: 03/14/25 0204    Blood culture #2 **CANNOT BE ORDERED STAT** [[id number]] Collected: 03/14/25 0158    Order Status: Resulted Specimen: Blood Updated: 03/14/25 0204              Assessment/Plan:    Bilateral lower extremity weakness, could not get out of bed for several days.  Varying degrees of bilateral neuroforaminal stenosis most severe at right L3-4.  Hypotension, hypoglycemia  Chronic vomiting, poor oral intake with history of gastric bypass  Chronic pain syndrome with chronic opioid usage and polypharmacy        ICU managing labs and electrolyte replacement as well as nutrition  Neuro/neuromotor exams  SCDs  Fall precautions      There are no acute neurosurgical interventions indicated at this time in the patient can follow up with Neurosurgery on an outpatient basis once she is medically stable and discharged.     Any further recommendations to follow       There are no hospital problems to display for this patient.      Thank you for your consult. I will follow-up with patient. Please contact us if you have any additional questions.    Cali Wise, [facility]-BC  Neurosurgery  [facility] - [facility] ICU

## 2025-03-15 ENCOUNTER — PATIENT MESSAGE (OUTPATIENT)
Dept: HEMATOLOGY/ONCOLOGY | Facility: CLINIC | Age: 50
End: 2025-03-15
Payer: COMMERCIAL

## 2025-03-15 LAB
ALBUMIN SERPL-MCNC: 3 G/DL (ref 3.5–5)
ALBUMIN/GLOB SERPL: 1.2 RATIO (ref 1.1–2)
ALP SERPL-CCNC: 170 UNIT/L (ref 40–150)
ALT SERPL-CCNC: 252 UNIT/L (ref 0–55)
ANION GAP SERPL CALC-SCNC: 9 MEQ/L
AST SERPL-CCNC: 170 UNIT/L (ref 5–34)
BASOPHILS # BLD AUTO: 0.01 X10(3)/MCL
BASOPHILS NFR BLD AUTO: 0.2 %
BILIRUB SERPL-MCNC: 0.3 MG/DL
BUN SERPL-MCNC: 7.6 MG/DL (ref 9.8–20.1)
CALCIUM SERPL-MCNC: 8.4 MG/DL (ref 8.4–10.2)
CHLORIDE SERPL-SCNC: 111 MMOL/L (ref 98–107)
CO2 SERPL-SCNC: 21 MMOL/L (ref 22–29)
COPPER SERPL-MCNC: 76 MCG/DL (ref 77–206)
CREAT SERPL-MCNC: 0.62 MG/DL (ref 0.55–1.02)
CREAT/UREA NIT SERPL: 12
EOSINOPHIL # BLD AUTO: 0 X10(3)/MCL (ref 0–0.9)
EOSINOPHIL NFR BLD AUTO: 0 %
ERYTHROCYTE [DISTWIDTH] IN BLOOD BY AUTOMATED COUNT: 13 % (ref 11.5–17)
GFR SERPLBLD CREATININE-BSD FMLA CKD-EPI: >60 ML/MIN/1.73/M2
GLOBULIN SER-MCNC: 2.6 GM/DL (ref 2.4–3.5)
GLUCOSE SERPL-MCNC: 121 MG/DL (ref 74–100)
HCT VFR BLD AUTO: 36.8 % (ref 37–47)
HGB BLD-MCNC: 12.5 G/DL (ref 12–16)
IMM GRANULOCYTES # BLD AUTO: 0.02 X10(3)/MCL (ref 0–0.04)
IMM GRANULOCYTES NFR BLD AUTO: 0.3 %
LYMPHOCYTES # BLD AUTO: 0.56 X10(3)/MCL (ref 0.6–4.6)
LYMPHOCYTES NFR BLD AUTO: 9.3 %
MAGNESIUM SERPL-MCNC: 1.9 MG/DL (ref 1.6–2.6)
MCH RBC QN AUTO: 30.9 PG (ref 27–31)
MCHC RBC AUTO-ENTMCNC: 34 G/DL (ref 33–36)
MCV RBC AUTO: 90.9 FL (ref 80–94)
MONOCYTES # BLD AUTO: 0.13 X10(3)/MCL (ref 0.1–1.3)
MONOCYTES NFR BLD AUTO: 2.2 %
NEUTROPHILS # BLD AUTO: 5.31 X10(3)/MCL (ref 2.1–9.2)
NEUTROPHILS NFR BLD AUTO: 88 %
NRBC BLD AUTO-RTO: 0 %
PHOSPHATE SERPL-MCNC: 4 MG/DL (ref 2.3–4.7)
PLATELET # BLD AUTO: 245 X10(3)/MCL (ref 130–400)
PMV BLD AUTO: 9.7 FL (ref 7.4–10.4)
POCT GLUCOSE: 107 MG/DL (ref 70–110)
POCT GLUCOSE: 132 MG/DL (ref 70–110)
POTASSIUM SERPL-SCNC: 3.4 MMOL/L (ref 3.5–5.1)
PROT SERPL-MCNC: 5.6 GM/DL (ref 6.4–8.3)
RBC # BLD AUTO: 4.05 X10(6)/MCL (ref 4.2–5.4)
SODIUM SERPL-SCNC: 141 MMOL/L (ref 136–145)
WBC # BLD AUTO: 6.03 X10(3)/MCL (ref 4.5–11.5)
ZINC SERPL-MCNC: 49 MCG/DL (ref 60–106)

## 2025-03-15 PROCEDURE — 25000003 PHARM REV CODE 250

## 2025-03-15 PROCEDURE — 80053 COMPREHEN METABOLIC PANEL: CPT

## 2025-03-15 PROCEDURE — 63600175 PHARM REV CODE 636 W HCPCS

## 2025-03-15 PROCEDURE — 36415 COLL VENOUS BLD VENIPUNCTURE: CPT

## 2025-03-15 PROCEDURE — 97162 PT EVAL MOD COMPLEX 30 MIN: CPT

## 2025-03-15 PROCEDURE — 83735 ASSAY OF MAGNESIUM: CPT

## 2025-03-15 PROCEDURE — 25000003 PHARM REV CODE 250: Performed by: INTERNAL MEDICINE

## 2025-03-15 PROCEDURE — 84100 ASSAY OF PHOSPHORUS: CPT

## 2025-03-15 PROCEDURE — 63600175 PHARM REV CODE 636 W HCPCS: Performed by: INTERNAL MEDICINE

## 2025-03-15 PROCEDURE — 11000001 HC ACUTE MED/SURG PRIVATE ROOM

## 2025-03-15 PROCEDURE — 85025 COMPLETE CBC W/AUTO DIFF WBC: CPT

## 2025-03-15 PROCEDURE — 25000242 PHARM REV CODE 250 ALT 637 W/ HCPCS: Performed by: INTERNAL MEDICINE

## 2025-03-15 PROCEDURE — 25000003 PHARM REV CODE 250: Performed by: HOSPITALIST

## 2025-03-15 RX ORDER — ZOLPIDEM TARTRATE 5 MG/1
5 TABLET ORAL NIGHTLY PRN
Status: DISCONTINUED | OUTPATIENT
Start: 2025-03-15 | End: 2025-03-16

## 2025-03-15 RX ORDER — ALUMINUM HYDROXIDE, MAGNESIUM HYDROXIDE, AND SIMETHICONE 1200; 120; 1200 MG/30ML; MG/30ML; MG/30ML
30 SUSPENSION ORAL EVERY 6 HOURS PRN
Status: DISCONTINUED | OUTPATIENT
Start: 2025-03-15 | End: 2025-03-16 | Stop reason: HOSPADM

## 2025-03-15 RX ADMIN — MUPIROCIN: 20 OINTMENT TOPICAL at 08:03

## 2025-03-15 RX ADMIN — ENOXAPARIN SODIUM 40 MG: 40 INJECTION SUBCUTANEOUS at 05:03

## 2025-03-15 RX ADMIN — ZOLPIDEM TARTRATE 5 MG: 5 TABLET ORAL at 12:03

## 2025-03-15 RX ADMIN — MUPIROCIN: 20 OINTMENT TOPICAL at 09:03

## 2025-03-15 RX ADMIN — LEVOTHYROXINE SODIUM 125 MCG: 125 TABLET ORAL at 06:03

## 2025-03-15 RX ADMIN — ALUMINUM HYDROXIDE, MAGNESIUM HYDROXIDE, AND DIMETHICONE 30 ML: 200; 20; 200 SUSPENSION ORAL at 09:03

## 2025-03-15 RX ADMIN — LISDEXAMFETAMINE DIMESYLATE 70 MG: 70 CAPSULE ORAL at 09:03

## 2025-03-15 RX ADMIN — HYDROCODONE BITARTRATE AND ACETAMINOPHEN 2 TABLET: 5; 325 TABLET ORAL at 01:03

## 2025-03-15 RX ADMIN — HYDROCODONE BITARTRATE AND ACETAMINOPHEN 2 TABLET: 5; 325 TABLET ORAL at 08:03

## 2025-03-15 RX ADMIN — FAMOTIDINE 20 MG: 20 TABLET, FILM COATED ORAL at 09:03

## 2025-03-15 RX ADMIN — HYDROCODONE BITARTRATE AND ACETAMINOPHEN 2 TABLET: 5; 325 TABLET ORAL at 06:03

## 2025-03-15 RX ADMIN — HYDROCORTISONE SODIUM SUCCINATE 100 MG: 100 INJECTION, POWDER, FOR SOLUTION INTRAMUSCULAR; INTRAVENOUS at 01:03

## 2025-03-15 RX ADMIN — HYDROCODONE BITARTRATE AND ACETAMINOPHEN 2 TABLET: 5; 325 TABLET ORAL at 12:03

## 2025-03-15 RX ADMIN — POTASSIUM BICARBONATE 50 MEQ: 978 TABLET, EFFERVESCENT ORAL at 06:03

## 2025-03-15 RX ADMIN — HYDROCORTISONE SODIUM SUCCINATE 100 MG: 100 INJECTION, POWDER, FOR SOLUTION INTRAMUSCULAR; INTRAVENOUS at 06:03

## 2025-03-15 RX ADMIN — ZOLPIDEM TARTRATE 5 MG: 5 TABLET ORAL at 08:03

## 2025-03-15 NOTE — PROGRESS NOTES
"  [facility]    Cardiology  Progress Note    Patient Name: Tayla Lambert  MRN: [id number]  Admission Date: 3/13/2025  Hospital Length of Stay: 1 days  Code Status: Full Code   Attending Provider: Kristina Rodríguez MD   Consulting Provider: CATIE Blood  Primary Care Physician: Iliana Merritt MD  Principal Problem:<principal problem not specified>    Patient information was obtained from patient, past medical records, ER records, and primary team.     Subjective:   Chief Complaint/Reason for Consult: Bradycardia    HPI: Ms. Lambert is a 49 year old female, unknown to [facility], who presented to the hospital with weakness and CP. Also reported abdominal discomfort. Patient was found to be hypotensive and required Levophed support including ICU Admission. Troponin normal. EKG revealed SR with IRBBB. Tele review revealed SR with occasional blocked PACS. [facility] consulted due to perceived bradycardia, and CP.    3.15.25: NAD. VSS. No complaints of CP/SOB/Palps. "I feel okay"     PMH: ADHD, Anxiety, Asthma, Depression, DM II, Hypertension, Hypothyroidism, Kidney Stone, Migraine, RA, T 12 Burst Fracture, Tremors of Nervous System, DM II  PSH: Abdominal Surgery, Appendectomy, Back Surgery, , Cholecystectomy, EGD, Gastric Bypass, Hysterectomy, Tonsillectomy  Family History: Father- Hypertension, Mother- DM  Social History: Tobacco- Negative, Alcohol- Negative, Substance Abuse- Negative    Previous Cardiac Diagnostics:   ECHO (3.14.25):  Left Ventricle: The left ventricle is normal in size. Normal wall thickness. There is normal systolic function with a visually estimated ejection fraction of 55 - 60%. Grade I diastolic dysfunction. Right Ventricle: The right ventricle is normal in size. Systolic function is normal. TAPSE is 2.17 cm. Mitral Valve: There is trace regurgitation. Tricuspid Valve: There is trace regurgitation. Pulmonary Artery: The estimated pulmonary artery systolic " pressure is 16 mmHg. IVC/SVC: Normal venous pressure at 3 mmHg. Pericardium: There is no pericardial effusion.      Review of patient's allergies indicates:  No Known Allergies  No current facility-administered medications on file prior to encounter.     Current Outpatient Medications on File Prior to Encounter   Medication Sig    albuterol (PROVENTIL/VENTOLIN HFA) 90 mcg/actuation inhaler Inhale 2 puffs into the lungs every 6 (six) hours as needed for Wheezing. Rescue    cariprazine (VRAYLAR) 1.5 mg Cap Take 1 capsule (1.5 mg total) by mouth every evening.    cetirizine (ZYRTEC) 10 MG tablet Take 1 tablet by mouth every morning.    FLUoxetine 20 MG capsule Take 1 capsule by mouth once daily.    fluticasone propionate (FLONASE) 50 mcg/actuation nasal spray 1 spray by Each Nostril route.    gabapentin (NEURONTIN) 300 MG capsule Take 1 capsule (300 mg total) by mouth 3 (three) times daily.    hyoscyamine 0.125 mg Subl Place 1 tablet (0.125 mg total) under the tongue every 4 (four) hours as needed (abdominal cramping).    lurasidone (LATUDA) 60 mg Tab tablet Take 1 tablet by mouth once daily.    olmesartan (BENICAR) 40 MG tablet Take 1 tablet (40 mg total) by mouth once daily.    ondansetron (ZOFRAN) 4 MG tablet Take 1 tablet (4 mg total) by mouth every 8 (eight) hours as needed for Nausea.    pantoprazole (PROTONIX) 40 MG tablet Take 1 tablet (40 mg total) by mouth 2 (two) times daily for 56 days, THEN 1 tablet (40 mg total) once daily.    promethazine (PHENERGAN) 25 MG tablet Take 1 tablet by mouth every 4 (four) hours.    temazepam (RESTORIL) 15 mg Cap Take 1 capsule (15 mg total) by mouth nightly as needed (insomnia).    tirzepatide 15 mg/0.5 mL PnIj Inject 15 mg into the skin every 7 days.    triamcinolone acetonide 0.1% (KENALOG) 0.1 % cream Apply topically 2 (two) times daily. For eczema flares. Do not use for more than 14 days at a time.    TRINTELLIX 20 mg Tab Take 1 tablet (20 mg total) by mouth every  evening.    VYVANSE 70 mg capsule Take 1 capsule (70 mg total) by mouth every morning.    acetaminophen (TYLENOL) 500 MG tablet Take 500 mg by mouth every 6 (six) hours as needed for Pain.    cyanocobalamin, vitamin B-12, (B-12 KIT INJ) Inject 1,000 mcg as directed once a week.    divalproex ER (DEPAKOTE ER) 250 MG 24 hr tablet Take 1 tablet (250 mg total) by mouth once daily.    ergocalciferol (VITAMIN D2) 50,000 unit Cap Take 50,000 Units by mouth every 7 days.    estradioL (ESTRACE) 1 MG tablet Take 1 tablet (1 mg total) by mouth once daily.    HYDROcodone-acetaminophen (NORCO)  mg per tablet Take 1 tablet by mouth every 6 (six) hours as needed.    hydrOXYzine (ATARAX) 50 MG tablet Take 50 mg by mouth every 6 (six) hours as needed.    levothyroxine (SYNTHROID) 125 MCG tablet Take 125 mcg by mouth before breakfast.    polyethylene glycol (GLYCOLAX) 17 gram PwPk Take 17 g by mouth once daily.    topiramate (TOPAMAX) 200 MG Tab Take 200 mg by mouth 2 (two) times daily.    valACYclovir (VALTREX) 1000 MG tablet Take 1 tablet (1,000 mg total) by mouth 2 (two) times daily. For 5 days with outbreaks.     Review of Systems   Constitutional:  Negative for diaphoresis.   Respiratory:  Negative for chest tightness and shortness of breath.    Cardiovascular:  Negative for chest pain, palpitations and leg swelling.   All other systems reviewed and are negative.    Objective:     Vital Signs (Most Recent):  Temp: 98.7 °F (37.1 °C) (03/15/25 1118)  Pulse: 110 (03/15/25 1352)  Resp: 20 (03/15/25 1352)  BP: 123/75 (03/15/25 1300)  SpO2: 100 % (03/15/25 1352) Vital Signs (24h Range):  Temp:  [98.3 °F (36.8 °C)-98.7 °F (37.1 °C)] 98.7 °F (37.1 °C)  Pulse:  [] 110  Resp:  [9-24] 20  SpO2:  [92 %-100 %] 100 %  BP: ()/(60-87) 123/75   Weight: 69.5 kg (153 lb 3.5 oz)  Body mass index is 25.5 kg/m².  SpO2: 100 %       Intake/Output Summary (Last 24 hours) at 3/15/2025 1415  Last data filed at 3/15/2025 0600  Gross per  24 hour   Intake 994.93 ml   Output 1650 ml   Net -655.07 ml     Lines/Drains/Airways       Peripheral Intravenous Line  Duration                  Peripheral IV - Single Lumen 03/14/25 1530 20 G Anterior;Left;Proximal Forearm <1 day                  Significant Labs:   Chemistries:   Recent Labs   Lab 03/13/25  1038 03/14/25  0417 03/14/25  1207 03/14/25  1332 03/14/25  1940 03/15/25  0323    142  --   --   --  141   K 3.1* 2.9* 3.8  --   --  3.4*    110*  --   --   --  111*   CO2 23 25  --   --   --  21*   BUN 15.0 10.9  --   --   --  7.6*   CREATININE 0.86 0.79  --   --   --  0.62   CALCIUM 9.6 8.2*  --   --   --  8.4   BILITOT 0.3 0.3  --   --   --  0.3   ALKPHOS 121 195*  --   --   --  170*   ALT 44 422*  --   --   --  252*   AST 30 878*  --   --   --  170*   GLUCOSE 78 104*  --   --   --  121*   MG 2.20 2.00  --   --   --  1.90   PHOS  --  3.3  --   --   --  4.0   TROPONINI <0.010  --   --  <0.010 <0.010  --         CBC/Anemia Labs: Coags:    Recent Labs   Lab 03/13/25  1038 03/14/25  0158 03/14/25  0417 03/14/25  0606 03/15/25  0323   WBC 4.47*  --   --  4.22* 6.03   HGB 14.4 13.3  --  12.3 12.5   HCT 42.2 39.2  --  37.3 36.8*     --   --  294 245   MCV 90.4  --   --  92.3 90.9   RDW 12.9  --   --  13.1 13.0   IRON 50  --   --   --   --    FERRITIN 35.60  --   --   --   --    FOLATE  --   --  12.4  --   --    [id number]  --   --  >2,000*  --   --     Recent Labs   Lab 03/14/25  0828   INR 1.0        Significant Imaging:  Imaging Results              MRI Lumbar Spine Without Contrast (Final result)  Result time 03/14/25 11:03:42      Final result by Thelma Leroy MD (03/14/25 11:03:42)                   Impression:      1. Mild degenerative narrowing the spinal canal at L1-L5.  2. Multilevel neural foraminal stenoses as described.  No significant change from the [facility] interpretation.      Electronically signed by: Thelma Leroy  Date:    03/14/2025  Time:    11:03                Narrative:    EXAMINATION:  MRI LUMBAR SPINE WITHOUT CONTRAST    CLINICAL HISTORY:  Low back pain, cauda equina syndrome suspected;    TECHNIQUE:  Multiplanar multisequence MR images of the lumbar spine are obtained without contrast.    COMPARISON:  CT abdomen pelvis dated 03/13/2025    FINDINGS:  There are postoperative changes posterior spinal fusion hardware at T11 through L1, with chronic T12 compression deformity.  There is a leftward curvature of the lumbar spine with grade 1 retrolisthesis of L2 and L3.  The lumbar vertebral body heights are preserved.  There are degenerative endplate changes with reactive endplate edema at L3-L4.    The conus terminates at the level of L1.  It is normal in signal and contour.  There is no epidural fluid collection.    Disc spaces, spinal canal and neural foramina are as follows:    L1-L2: Disc bulge and facet hypertrophy with mild narrowing of the spinal canal.  Mild left neural foraminal stenosis.    L2-L3: Grade 1 retrolisthesis of L2 over L3 with disc bulge, facet hypertrophy and mild narrowing of the spinal canal.  Mild left neural foraminal stenosis.    L3-L4: Grade 1 retrolisthesis of L3 over L4 with disc bulge, facet hypertrophy and mild narrowing of the spinal canal.  Moderate right and mild left neural foraminal stenosis.    L4-L5: Disc bulge and facet hypertrophy with mild narrowing of the spinal canal.  Mild bilateral neural foraminal stenosis.    L5-S1: No disc herniation.  Bilateral facet hypertrophy.  No significant spinal canal or neural foraminal stenosis.    No significant abnormality within the visualized paraspinous musculature.                        Preliminary result by Luiz Arita MD (03/13/25 21:39:05)                   Impression:    1. There is multilevel disc desiccation and circumferential disc bulges at L1-L2 down to L4-L5 levels causing varying degrees of moderate to severe bilateral neural foraminal stenosis most severe at the right L3-L4  neural foramen where there is severe stenosis and compression of the exiting nerve root as seen on series 12 image 11-19. Correlate with clinical and laboratory findings regards additional evaluation and follow-up.  2. Details and other findings, as described above.               Narrative:    START OF REPORT:  Technique: Standard axial sagittal and coronal lumbar spine MRI sequences were performed without contrast.    Comparison: None.    Clinical history: Low back pain, cauda equina syndrome suspected.    Findings:  Distal cord and conus medullaris: Spinal cord and conus medullaris are unremarkable.  Integrity of the bone, bone marrow and discs:  Bone: There is a chronic appearing coronally oriented fracture involving the T12 vertebral bodystabilized by a posterior fusion hardware at T11 down to L1 levels as detailed in the separate report for the thoracic spine.  Discs: There is multilevel disc desiccation and circumferential disc bulges at L1-L2 down to L4-L5 levels causing varying degrees of moderate to severe bilateral neural foraminal stenosis most severe at the right L3-L4 neural foramen where there is severe stenosis and compression of the exiting nerve root as seen on series 12 image 11-19.                                         MRI Thoracic Spine Without Contrast (Final result)  Result time 03/14/25 11:14:52      Final result by Thelma Leroy MD (03/14/25 11:14:52)                   Impression:      1. No cord signal abnormality.  2. Chronic T12 compression deformity with loss of height, bony retropulsion and mild narrowing of the spinal canal.  3. Small central disc protrusion at T9-T10 with minimal narrowing of the canal.  No significant change from the [facility] interpretation      Electronically signed by: Thelma Leroy  Date:    03/14/2025  Time:    11:14               Narrative:    EXAMINATION:  MRI THORACIC SPINE WITHOUT CONTRAST    CLINICAL HISTORY:  Myelopathy, acute, thoracic  spine;    TECHNIQUE:  Multiplanar multisequence MR images of the thoracic spine are obtained without contrast.    COMPARISON:  X-rays dated 06/03/2018    FINDINGS:  There is a rightward curvature of the thoracic spine there are postoperative changes posterior spinal fusion hardware at T11-L1.  There is a chronic T12 compression deformity with mild loss of height.  There is approximately 4 mm of bony retropulsion with mild narrowing of the spinal canal.  The remaining vertebral body heights are preserved.  There is no bone marrow edema.    There is no cord signal abnormality.  There is no epidural fluid collection.    Central disc protrusion at T9-T10 measures 3 mm in AP dimension with minimal narrowing of the spinal canal.  The spinal canal and neural foramina are otherwise patent.    There are postoperative changes the paraspinal soft tissues.  The soft tissues are otherwise unremarkable.                        Preliminary result by Luiz Arita MD (03/13/25 21:32:26)                   Impression:    1. There is a incompletely imaged chronic fracture involving the T12 vertebral body with mild retropulsion of the posterior cortex causing mild spinal canal stenosis with no definitive associated cord compression as seen on series 4 image 10. This fracture is stabilized by a posterior fusion hardware seen at T11 down to L1 levels. Correlate with clinical and laboratory findings regards additional evaluation and follow-up as clinically indicated.  2. No definitive abnormal cord signal is identified on this limited study.  3. Details and other findings as discussed.               Narrative:    START OF REPORT:  Technique: Multiplanar multisequence MRI of the thoracic spine without contrast was performed in neutral position.    Comparison: None.    Clinical History: Myelopathy, acute, thoracic spine.    Findings: The study is severely limited due to motion artifact as well as pronounced metallic artifact related to  dorsal spine orthopedic hardware near the thoracolumbar junction.  Spine: There is a incompletely imaged chronic fracture involving the T12 vertebral body with mild retropulsion of the posterior cortex causing mild spinal canal stenosis with no definitive associated cord compression as seen on series 4 image 10. This fracture is stabilized by a posterior fusion hardware seen at T11 down to L1 levels.  Spinal cord: No definitive abnormal cord signal is identified on this limited study.  Anatomy: Normal.  Bone and marrow degenerative changes: Normal.  bone marrow, and disc integrity: There is no significant disc bulge identified.                                         CT Abdomen Pelvis With IV Contrast NO Oral Contrast (Final result)  Result time 03/13/25 16:37:03      Final result by Nila Pérez MD (03/13/25 16:37:03)                   Impression:      1. No appreciable acute intra-abdominal abnormality by CT evaluation      Electronically signed by: Nila Pérez  Date:    03/13/2025  Time:    16:37               Narrative:    EXAMINATION:  CT ABDOMEN PELVIS WITH IV CONTRAST    CLINICAL HISTORY:  Abdominal pain, acute, nonlocalized;  chest pain, generalized weakness, difficulty walking.  Abdominal pain    TECHNIQUE:  Helically acquired images with axial, sagittal and coronal reformations were obtained from the lung bases to the pubic symphysis after the IV administration of contrast.    Automated tube current modulation, weight-based exposure dosing, and/or iterative reconstruction technique utilized to reach lowest reasonably achievable exposure rate.    DLP: 699 mGy*cm    COMPARISON:  CT abdomen pelvis 02/05/2025    FINDINGS:  HEART: Normal in size. No pericardial effusion.    LUNG BASES: Well aerated.    LIVER: Normal attenuation. No appreciable focal hepatic lesion.    BILIARY: Gallbladder is surgically absent.    PANCREAS: No inflammatory change.    SPLEEN: Normal in size    ADRENALS: No  mass.    KIDNEYS/URETERS: The kidneys enhance symmetrically.  No hydronephrosis.    GI TRACT/MESENTERY: There appear to be postsurgical changes related to prior bariatric surgery.  No evidence of bowel obstruction.     Paucity of intra-abdominal fat mildly limits evaluation.    PERITONEUM: No free fluid.No free air.    LYMPH NODES: No enlarged lymph nodes by size criteria.    VASCULATURE: No significant atherosclerosis or aneurysm.    BLADDER: Normal appearance given degree of distention.    REPRODUCTIVE ORGANS: Uterus is surgically absent.    SOFT TISSUES: Unremarkable.    BONES: Postsurgical change the lumbar spinal fusion stabilizing T12 fracture.  Appearance similar to prior.  Degenerative change at the lumbar spine.                                       X-Ray Chest 1 View (Final result)  Result time 03/13/25 11:06:54      Final result by Bradley Granados MD (03/13/25 11:06:54)                   Impression:      No acute cardiopulmonary process identified.      Electronically signed by: Bradley Granados  Date:    03/13/2025  Time:    11:06               Narrative:    EXAMINATION:  XR CHEST 1 VIEW    CLINICAL HISTORY:  Chest pain, unspecified    TECHNIQUE:  One view    COMPARISON:  March 5, 2025..    FINDINGS:  Cardiopericardial silhouette is within normal limits. Lungs are without dense focal or segmental consolidation, congestive process, pleural effusions or pneumothorax.  Scoliosis and thoracolumbar vertebrae prior fusions.                                    EKG:       Telemetry:  SR    Physical Exam  Vitals and nursing note reviewed.   Constitutional:       General: She is not in acute distress.  HENT:      Mouth/Throat:      Mouth: Mucous membranes are moist.   Eyes:      Extraocular Movements: Extraocular movements intact.   Cardiovascular:      Rate and Rhythm: Normal rate and regular rhythm.   Pulmonary:      Effort: Pulmonary effort is normal. No respiratory distress.   Abdominal:      Palpations: Abdomen  is soft.   Skin:     General: Skin is warm and dry.   Neurological:      Mental Status: She is alert.      Comments: Drowsy   Psychiatric:         Behavior: Behavior normal.       Home Medications:   Medications Ordered Prior to Encounter[1]  Current Schedule Inpatient Medications:   [START ON 3/16/2025] cariprazine  1.5 mg Oral QHS    enoxparin  40 mg Subcutaneous Daily    famotidine  20 mg Oral Daily    hydrocortisone sodium succinate  100 mg Intravenous Q8H    levothyroxine  125 mcg Oral Before breakfast    lisdexamfetamine  70 mg Oral Daily    mupirocin   Nasal BID     Continuous Infusions:      Assessment:   Hypotension - Resolved     - Requiring Vasopressor Support    - History Hypertension  Incomplete Right Bundle Branch Block (Tele Reviewed blocked PACS with SR)  Hypokalemia  DM II    - Had some Hypoglycemia  Elevated Liver Enzymes - Improving   Anxiety/Depression  Migraine Headaches   Hypothyroidism  History of Renal Stone    Plan:   ECHO Reviewed   Troponin negative x3  Do not suspect Hypotension was from a cardiac origin  Keep Mag > 2 and Potassium > 4; Defer to Primary Team     We will sign off at this time.     CATIE Blood  Cardiology  [facility]     I agree with the findings of the complexity of problems addressed and take responsibility for the plan's risks and complications. I approved the plan documented by Christy Rodriguez NP.            [1]   No current facility-administered medications on file prior to encounter.     Current Outpatient Medications on File Prior to Encounter   Medication Sig Dispense Refill    albuterol (PROVENTIL/VENTOLIN HFA) 90 mcg/actuation inhaler Inhale 2 puffs into the lungs every 6 (six) hours as needed for Wheezing. Rescue 18 g 5    cariprazine (VRAYLAR) 1.5 mg Cap Take 1 capsule (1.5 mg total) by mouth every evening. 30 capsule 1    cetirizine (ZYRTEC) 10 MG tablet Take 1 tablet by mouth every morning.      FLUoxetine 20 MG capsule Take 1 capsule by  mouth once daily.      fluticasone propionate (FLONASE) 50 mcg/actuation nasal spray 1 spray by Each Nostril route.      gabapentin (NEURONTIN) 300 MG capsule Take 1 capsule (300 mg total) by mouth 3 (three) times daily. 90 capsule 1    hyoscyamine 0.125 mg Subl Place 1 tablet (0.125 mg total) under the tongue every 4 (four) hours as needed (abdominal cramping). 60 tablet 4    lurasidone (LATUDA) 60 mg Tab tablet Take 1 tablet by mouth once daily.      olmesartan (BENICAR) 40 MG tablet Take 1 tablet (40 mg total) by mouth once daily. 90 tablet 3    ondansetron (ZOFRAN) 4 MG tablet Take 1 tablet (4 mg total) by mouth every 8 (eight) hours as needed for Nausea. 16 tablet 0    pantoprazole (PROTONIX) 40 MG tablet Take 1 tablet (40 mg total) by mouth 2 (two) times daily for 56 days, THEN 1 tablet (40 mg total) once daily. 30 tablet 2    promethazine (PHENERGAN) 25 MG tablet Take 1 tablet by mouth every 4 (four) hours.      temazepam (RESTORIL) 15 mg Cap Take 1 capsule (15 mg total) by mouth nightly as needed (insomnia). 30 capsule 0    tirzepatide 15 mg/0.5 mL PnIj Inject 15 mg into the skin every 7 days. 4 Pen 3    triamcinolone acetonide 0.1% (KENALOG) 0.1 % cream Apply topically 2 (two) times daily. For eczema flares. Do not use for more than 14 days at a time. 80 g 2    TRINTELLIX 20 mg Tab Take 1 tablet (20 mg total) by mouth every evening. 30 tablet 2    VYVANSE 70 mg capsule Take 1 capsule (70 mg total) by mouth every morning. 30 capsule 0    acetaminophen (TYLENOL) 500 MG tablet Take 500 mg by mouth every 6 (six) hours as needed for Pain.      cyanocobalamin, vitamin B-12, (B-12 KIT INJ) Inject 1,000 mcg as directed once a week.      divalproex ER (DEPAKOTE ER) 250 MG 24 hr tablet Take 1 tablet (250 mg total) by mouth once daily. 30 tablet 0    ergocalciferol (VITAMIN D2) 50,000 unit Cap Take 50,000 Units by mouth every 7 days.      estradioL (ESTRACE) 1 MG tablet Take 1 tablet (1 mg total) by mouth once daily.  30 tablet 0    HYDROcodone-acetaminophen (NORCO)  mg per tablet Take 1 tablet by mouth every 6 (six) hours as needed. 12 tablet 0    hydrOXYzine (ATARAX) 50 MG tablet Take 50 mg by mouth every 6 (six) hours as needed.      levothyroxine (SYNTHROID) 125 MCG tablet Take 125 mcg by mouth before breakfast.      polyethylene glycol (GLYCOLAX) 17 gram PwPk Take 17 g by mouth once daily. 30 packet 11    topiramate (TOPAMAX) 200 MG Tab Take 200 mg by mouth 2 (two) times daily.      valACYclovir (VALTREX) 1000 MG tablet Take 1 tablet (1,000 mg total) by mouth 2 (two) times daily. For 5 days with outbreaks. 60 tablet 2

## 2025-03-15 NOTE — PLAN OF CARE
Problem: Physical Therapy  Goal: Physical Therapy Goal  Description: Pt will be seen for the following goals  1. Pt will be sba with all bed mobility  2. Pt will be sba with transfers with a rw   3. Pt will ambulate 80ft feet with a rw with min/cga  Outcome: Progressing

## 2025-03-15 NOTE — PT/OT/SLP EVAL
Physical Therapy Evaluation    Patient Name:  Tayla Lambert   MRN:  [id number]    Recommendations:     Discharge therapy intensity: High Intensity Therapy   Discharge Equipment Recommendations: wheelchair, walker, rolling, to be determined by next level of care   Barriers to discharge: Impaired mobility and Ongoing medical needs    Assessment:     Tayla Lambert is a 49 y.o. female admitted with a medical diagnosis of recurring hypotension and hypoglycemia. Severe B/L lumbar foraminal stenosis -   .  She presents with the following impairments/functional limitations: weakness, impaired self care skills, impaired balance, gait instability, impaired endurance, impaired functional mobility, decreased lower extremity function, decreased safety awareness, decreased coordination . Pt has BLE weakness and has great difficulty walking. I feel acute rehab would be a great setting for her to get more independent    Rehab Prognosis: Good; patient would benefit from acute skilled PT services to address these deficits and reach maximum level of function.    Recent Surgery: * No surgery found *      Plan:     During this hospitalization, patient would benefit from acute PT services 6 x/week to address the identified rehab impairments via gait training, therapeutic activities, therapeutic exercises and progress toward the following goals:    Plan of Care Expires:  04/05/25    Subjective     Chief Complaint:   Patient/Family Comments/goals:   Pain/Comfort:       Patients cultural, spiritual, Restorationist conflicts given the current situation:      Living Environment:  Pt lives with significant other in a first floor apt  Prior to admission, patients level of function was needing some assist  Equipment used at home: cane, straight.  DME owned (not currently used): .  Upon discharge, patient will have assistance from TBD.    Objective:     Communicated with nurse prior to session.  Patient found supine with blood pressure cuff,  telemetry, pulse ox (continuous)  upon PT entry to room.    General Precautions: Standard, fall  Orthopedic Precautions:N/A   Braces: N/A  Respiratory Status: Room air  Blood Pressure:       Exams:  RLE ROM: limited  RLE Strength: 2+/5 hip 3-/5 knees and ankle  LLE ROM: limited  LLE Strength: same as RLE  Skin integrity:       Functional Mobility:  Bed Mobility:     Scooting: maximal assistance  Supine to Sit: contact guard assistance  Sit to Supine: minimum assistance  Transfers:     Sit to Stand:  minimum assistance with rolling walker  Bed to Chair: moderate assistance with  rolling walker  using  Stand Pivot  Gait: pt ambulated with a rw 8feet with moda with pt having consistent knee buckling BLES      AM-PAC 6 CLICK MOBILITY  Total Score:        Treatment & Education:      Patient provided with verbal education education regarding  PT roles and responsibilities. Understanding was verbalized.     Patient left supine with all lines intact and call button in reach.    GOALS:   Multidisciplinary Problems       Physical Therapy Goals          Problem: Physical Therapy    Goal Priority Disciplines Outcome Interventions   Physical Therapy Goal     PT, PT/OT Progressing    Description: Pt will be seen for the following goals  1. Pt will be sba with all bed mobility  2. Pt will be sba with transfers with a rw   3. Pt will ambulate 80ft feet with a rw with min/cga                       History:     Past Medical History:   Diagnosis Date    ADHD (attention deficit hyperactivity disorder)     Anxiety     Asthma     Depression     Diabetes mellitus     Encounter for blood transfusion     Essential hypertension 02/22/2022    Hypothyroidism 02/04/2020    Kidney stone 02/08/2022    Migraine without status migrainosus, not intractable 02/04/2020    Rheumatoid arthritis involving both hands with positive rheumatoid factor 02/21/2018    T12 burst fracture     Tremors of nervous system 02/04/2020    Type 2 diabetes mellitus  2024       Past Surgical History:   Procedure Laterality Date    ABDOMINAL SURGERY      APPENDECTOMY      BACK SURGERY  2018    T11 to L1 PSF w/T12 laminectomy     SECTION      CHOLECYSTECTOMY      ESOPHAGOGASTRODUODENOSCOPY N/A 12/10/2024    Procedure: EGD (ESOPHAGOGASTRODUODENOSCOPY);  Surgeon: Hua Ibarra MD;  Location: [address]);  Service: Gastroenterology;  Laterality: N/A;    GASTRIC BYPASS      HYSTERECTOMY      TONSILLECTOMY         Time Tracking:     PT Received On:    PT Start Time: 1120     PT Stop Time: 1145  PT Total Time (min): 25 min     Billable Minutes: Evaluation 25      03/15/2025

## 2025-03-15 NOTE — H&P
[facility] Medicine History & Physical Examination       Patient Name: Tayla Lambert  MRN: [id number]  Patient Class: IP- Inpatient   Admission Date: 3/13/2025   Admitting Physician: DURAN Service   Length of Stay: 1  Attending Physician: Dr Kristina Rodríguez  Primary Care Provider: Iliana Merritt MD  Face-to-Face encounter date: 03/15/2025  Code Status: Full code  Chief Complaint: Chest Pain (Pt c/o chest pain, weakness, difficulty walking for several days, Denies PMH)        Screening for Social Drivers for health:  Patient screened for food insecurity, housing instability, transportation needs, utility difficulties, and interpersonal safety (select all that apply as identified as concern)  []Housing or Food  []Transportation Needs  []Utility Difficulties  []Interpersonal safety  [x]None    Patient information was obtained from patient, patient's family, past medical records and/or ER records.     HISTORY OF PRESENT ILLNESS:   Tayla Lambert is a 49 y.o. female who  has a past medical history of ADHD (attention deficit hyperactivity disorder), Anxiety, Asthma, Depression, Diabetes mellitus, Encounter for blood transfusion, Essential hypertension (02/22/2022), Hypothyroidism (02/04/2020), Kidney stone (02/08/2022), Migraine without status migrainosus, not intractable (02/04/2020), Rheumatoid arthritis involving both hands with positive rheumatoid factor (02/21/2018), T12 burst fracture, Tremors of nervous system (02/04/2020), and Type 2 diabetes mellitus (11/25/2024).. The patient presented to [facility] on 3/13/2025 with a primary complaint of  2 day hx of weakness, chest pain, and abdominal pain. Patient endorses daily vomiting and chronic diarrhea for 3 years. Troponin normal. CBC H/H 13.3/39.2, CMP with mild hypokalemia 3.1. TSH normal. CT abdomen and pelvis performed in ED with no acute abnormalities, MRI of thoracic and lumbar spine with posterior fusion of T11-L1 and  moderate to severe bilateral neural foramen stenosis. During work up in ED, patient had multiple hypotensive episodes in 70s/40s, received 3L LR bolus within mild improvement, patient was started on levophed. Concurrently, patient also had hypoglycemic episode with CBGs in 20s, resolved with D50W in ED. Pt was admitted ICU unit on vasopressor therapy. Her blood pressure has improved and weaned off vasopressor therapy. PT has been cleared for transfer from ICU to the floor. Hospital medicine services have been consulted for assumption of care.     PAST MEDICAL HISTORY:     Past Medical History:   Diagnosis Date    ADHD (attention deficit hyperactivity disorder)     Anxiety     Asthma     Depression     Diabetes mellitus     Encounter for blood transfusion     Essential hypertension 2022    Hypothyroidism 2020    Kidney stone 2022    Migraine without status migrainosus, not intractable 2020    Rheumatoid arthritis involving both hands with positive rheumatoid factor 2018    T12 burst fracture     Tremors of nervous system 2020    Type 2 diabetes mellitus 2024       PAST SURGICAL HISTORY:     Past Surgical History:   Procedure Laterality Date    ABDOMINAL SURGERY      APPENDECTOMY      BACK SURGERY  2018    T11 to L1 PSF w/T12 laminectomy     SECTION      CHOLECYSTECTOMY      ESOPHAGOGASTRODUODENOSCOPY N/A 12/10/2024    Procedure: EGD (ESOPHAGOGASTRODUODENOSCOPY);  Surgeon: Hua Ibarra MD;  Location: [address]);  Service: Gastroenterology;  Laterality: N/A;    GASTRIC BYPASS      HYSTERECTOMY      TONSILLECTOMY         ALLERGIES:   Patient has no known allergies.    FAMILY HISTORY:   Reviewed and negative    SOCIAL HISTORY:     Social History     Tobacco Use    Smoking status: Never    Smokeless tobacco: Never   Substance Use Topics    Alcohol use: Not Currently        HOME MEDICATIONS:   As documented  Prior to Admission medications     Medication Sig Start Date End Date Taking? Authorizing Provider   albuterol (PROVENTIL/VENTOLIN HFA) 90 mcg/actuation inhaler Inhale 2 puffs into the lungs every 6 (six) hours as needed for Wheezing. Rescue 2/21/25  Yes Iliana Merritt MD   cariprazine (VRAYLAR) 1.5 mg Cap Take 1 capsule (1.5 mg total) by mouth every evening. 2/21/25  Yes Iliana Merritt MD   cetirizine (ZYRTEC) 10 MG tablet Take 1 tablet by mouth every morning. 12/3/24  Yes Provider, Historical   FLUoxetine 20 MG capsule Take 1 capsule by mouth once daily. 11/27/24  Yes Provider, Historical   fluticasone propionate (FLONASE) 50 mcg/actuation nasal spray 1 spray by Each Nostril route.   Yes Provider, Historical   gabapentin (NEURONTIN) 300 MG capsule Take 1 capsule (300 mg total) by mouth 3 (three) times daily. 2/21/25 4/22/25 Yes Iliana Merritt MD   hyoscyamine 0.125 mg Subl Place 1 tablet (0.125 mg total) under the tongue every 4 (four) hours as needed (abdominal cramping). 2/21/25 4/12/25 Yes Iliana Merritt MD   lurasidone (LATUDA) 60 mg Tab tablet Take 1 tablet by mouth once daily.   Yes Provider, Historical   olmesartan (BENICAR) 40 MG tablet Take 1 tablet (40 mg total) by mouth once daily. 2/21/25 2/16/26 Yes Iliana Merritt MD   ondansetron (ZOFRAN) 4 MG tablet Take 1 tablet (4 mg total) by mouth every 8 (eight) hours as needed for Nausea. 2/13/25  Yes Dre Vaughan MD   pantoprazole (PROTONIX) 40 MG tablet Take 1 tablet (40 mg total) by mouth 2 (two) times daily for 56 days, THEN 1 tablet (40 mg total) once daily. 2/21/25 4/18/26 Yes Iliana Merritt MD   promethazine (PHENERGAN) 25 MG tablet Take 1 tablet by mouth every 4 (four) hours. 11/15/24  Yes Provider, Historical   temazepam (RESTORIL) 15 mg Cap Take 1 capsule (15 mg total) by mouth nightly as needed (insomnia). 2/21/25  Yes Iliana Merritt MD   tirzepatide 15 mg/0.5 mL PnIj Inject 15 mg into the skin  every 7 days. 2/21/25  Yes Iliana Merritt MD   triamcinolone acetonide 0.1% (KENALOG) 0.1 % cream Apply topically 2 (two) times daily. For eczema flares. Do not use for more than 14 days at a time. 2/7/25  Yes Iliana Merritt MD   TRINTELLIX 20 mg Tab Take 1 tablet (20 mg total) by mouth every evening. 2/21/25  Yes Iliana Merritt MD   VYVANSE 70 mg capsule Take 1 capsule (70 mg total) by mouth every morning. 2/21/25  Yes Iliana Merritt MD   acetaminophen (TYLENOL) 500 MG tablet Take 500 mg by mouth every 6 (six) hours as needed for Pain.    Provider, Historical   cyanocobalamin, vitamin B-12, (B-12 KIT INJ) Inject 1,000 mcg as directed once a week.    Provider, Historical   divalproex ER (DEPAKOTE ER) 250 MG 24 hr tablet Take 1 tablet (250 mg total) by mouth once daily. 2/21/25   Iliana Merritt MD   ergocalciferol (VITAMIN D2) 50,000 unit Cap Take 50,000 Units by mouth every 7 days.    Provider, Historical   estradioL (ESTRACE) 1 MG tablet Take 1 tablet (1 mg total) by mouth once daily. 2/21/25   Iliana Merritt MD   HYDROcodone-acetaminophen (NORCO)  mg per tablet Take 1 tablet by mouth every 6 (six) hours as needed. 3/5/25   Asiya Morgan PA-C   hydrOXYzine (ATARAX) 50 MG tablet Take 50 mg by mouth every 6 (six) hours as needed. 3/3/25   Provider, Historical   levothyroxine (SYNTHROID) 125 MCG tablet Take 125 mcg by mouth before breakfast.    Provider, Historical   polyethylene glycol (GLYCOLAX) 17 gram PwPk Take 17 g by mouth once daily. 3/5/25   Naye Hall, FNP-C   topiramate (TOPAMAX) 200 MG Tab Take 200 mg by mouth 2 (two) times daily.    Provider, Historical   valACYclovir (VALTREX) 1000 MG tablet Take 1 tablet (1,000 mg total) by mouth 2 (two) times daily. For 5 days with outbreaks. 2/21/25 2/21/26  Iliana Merritt MD       REVIEW OF SYSTEMS:   Except as documented, all other systems reviewed and negative      PHYSICAL EXAM:     VITAL SIGNS: 24 HRS MIN & MAX LAST   Temp  Min: 98.4 °F (36.9 °C)  Max: 98.8 °F (37.1 °C) 98.6 °F (37 °C)   BP  Min: 97/61  Max: 141/85 133/82   Pulse  Min: 64  Max: 145  90   Resp  Min: 9  Max: 24 16   SpO2  Min: 95 %  Max: 100 % 99 %       General appearance: Well-developed, well-nourished female ill appearing, chronically ill-appearing looks older than stated age; nontoxic, drowsy  HENT: Atraumatic head. Moist mucous membranes of oral cavity.  Eyes: PERRL   Lungs: Clear to auscultation bilaterally. No wheezing present.   Heart: Regular rate and rhythm. S1 and S2 present cap refill brisk  Abdomen: Soft, non-distended, generalized TTP, Bowel sounds are normal.   Extremities: No cyanosis, clubbing,generalized weakness.   Skin: warm and dry.   Neuro:  no acute focal deficits  Psych/mental status: flat affect cooperative    LABS AND IMAGING:     Recent Labs   Lab 03/13/25  1038 03/14/25  0158 03/14/25  0606 03/15/25  0323   WBC 4.47*  --  4.22* 6.03   RBC 4.67  --  4.04* 4.05*   HGB 14.4 13.3 12.3 12.5   HCT 42.2 39.2 37.3 36.8*   MCV 90.4  --  92.3 90.9   MCH 30.8  --  30.4 30.9   MCHC 34.1  --  33.0 34.0   RDW 12.9  --  13.1 13.0     --  294 245   MPV 10.0  --  9.9 9.7       Recent Labs   Lab 03/13/25  1038 03/14/25  0417 03/14/25  0825 03/14/25  1207 03/15/25  0323    142  --   --  141   K 3.1* 2.9*  --  3.8 3.4*    110*  --   --  111*   CO2 23 25  --   --  21*   BUN 15.0 10.9  --   --  7.6*   CREATININE 0.86 0.79  --   --  0.62   CALCIUM 9.6 8.2*  --   --  8.4   PH  --   --  7.420  --   --    MG 2.20 2.00  --   --  1.90   ALBUMIN 4.1 3.0*  --   --  3.0*   ALKPHOS 121 195*  --   --  170*   ALT 44 422*  --   --  252*   AST 30 878*  --   --  170*   BILITOT 0.3 0.3  --   --  0.3       Microbiology Results (last 7 days)       Procedure Component Value Units Date/Time    Blood culture #2 **CANNOT BE ORDERED STAT** [[id number]]  (Normal) Collected: 03/14/25 0158    Order Status:  Completed Specimen: Blood Updated: 03/15/25 0300     Blood Culture No Growth At 24 Hours    Blood culture #1 **CANNOT BE ORDERED STAT** [[id number]]  (Normal) Collected: 03/14/25 0158    Order Status: Completed Specimen: Blood Updated: 03/15/25 0300     Blood Culture No Growth At 24 Hours             Echo Saline Bubble? No    Left Ventricle: The left ventricle is normal in size. Normal wall   thickness. There is normal systolic function with a visually estimated   ejection fraction of 55 - 60%. Grade I diastolic dysfunction.    Right Ventricle: The right ventricle is normal in size. Systolic   function is normal. TAPSE is 2.17 cm.    Mitral Valve: There is trace regurgitation.    Tricuspid Valve: There is trace regurgitation.    Pulmonary Artery: The estimated pulmonary artery systolic pressure is   16 mmHg.    IVC/SVC: Normal venous pressure at 3 mmHg.    Pericardium: There is no pericardial effusion.  MRI Thoracic Spine Without Contrast  Narrative: EXAMINATION:  MRI THORACIC SPINE WITHOUT CONTRAST    CLINICAL HISTORY:  Myelopathy, acute, thoracic spine;    TECHNIQUE:  Multiplanar multisequence MR images of the thoracic spine are obtained without contrast.    COMPARISON:  X-rays dated 06/03/2018    FINDINGS:  There is a rightward curvature of the thoracic spine there are postoperative changes posterior spinal fusion hardware at T11-L1.  There is a chronic T12 compression deformity with mild loss of height.  There is approximately 4 mm of bony retropulsion with mild narrowing of the spinal canal.  The remaining vertebral body heights are preserved.  There is no bone marrow edema.    There is no cord signal abnormality.  There is no epidural fluid collection.    Central disc protrusion at T9-T10 measures 3 mm in AP dimension with minimal narrowing of the spinal canal.  The spinal canal and neural foramina are otherwise patent.    There are postoperative changes the paraspinal soft tissues.  The soft tissues are  otherwise unremarkable.  Impression: 1. No cord signal abnormality.  2. Chronic T12 compression deformity with loss of height, bony retropulsion and mild narrowing of the spinal canal.  3. Small central disc protrusion at T9-T10 with minimal narrowing of the canal.  No significant change from the [facility] interpretation    Electronically signed by: Thelma Leroy  Date:    03/14/2025  Time:    11:14  MRI Lumbar Spine Without Contrast  Narrative: EXAMINATION:  MRI LUMBAR SPINE WITHOUT CONTRAST    CLINICAL HISTORY:  Low back pain, cauda equina syndrome suspected;    TECHNIQUE:  Multiplanar multisequence MR images of the lumbar spine are obtained without contrast.    COMPARISON:  CT abdomen pelvis dated 03/13/2025    FINDINGS:  There are postoperative changes posterior spinal fusion hardware at T11 through L1, with chronic T12 compression deformity.  There is a leftward curvature of the lumbar spine with grade 1 retrolisthesis of L2 and L3.  The lumbar vertebral body heights are preserved.  There are degenerative endplate changes with reactive endplate edema at L3-L4.    The conus terminates at the level of L1.  It is normal in signal and contour.  There is no epidural fluid collection.    Disc spaces, spinal canal and neural foramina are as follows:    L1-L2: Disc bulge and facet hypertrophy with mild narrowing of the spinal canal.  Mild left neural foraminal stenosis.    L2-L3: Grade 1 retrolisthesis of L2 over L3 with disc bulge, facet hypertrophy and mild narrowing of the spinal canal.  Mild left neural foraminal stenosis.    L3-L4: Grade 1 retrolisthesis of L3 over L4 with disc bulge, facet hypertrophy and mild narrowing of the spinal canal.  Moderate right and mild left neural foraminal stenosis.    L4-L5: Disc bulge and facet hypertrophy with mild narrowing of the spinal canal.  Mild bilateral neural foraminal stenosis.    L5-S1: No disc herniation.  Bilateral facet hypertrophy.  No significant spinal canal  or neural foraminal stenosis.    No significant abnormality within the visualized paraspinous musculature.  Impression: 1. Mild degenerative narrowing the spinal canal at L1-L5.  2. Multilevel neural foraminal stenoses as described.  No significant change from the [facility] interpretation.    Electronically signed by: Thelma Leroy  Date:    03/14/2025  Time:    11:03  US Abdomen Limited  Narrative: EXAMINATION:  US ABDOMEN LIMITED    CLINICAL HISTORY:  rule out portal vein thrombosis;    COMPARISON:  12/23/2024    FINDINGS:  Pancreas is not visualized due to overlying bowel gas.  The liver is incompletely visualized and measures 14.6 cm a focal lesion is not demonstrated.  There is hepatopetal flow in the portal vein.  Common bile duct measures 5 mm.    The right kidney measures 10.5 x 5.1 by 4 cm there are no focal lesions noted there is no hydronephrosis.  Impression: Limited study.    No acute abnormalities are demonstrated.    Electronically signed by: Carroll Plata MD  Date:    03/14/2025  Time:    10:23        ASSESSMENT & PLAN:   ASSESSMENT:  Acute on no pain generalized-POA   Hypotensive shock-requiring vasopressor therapy-POA   Dm type 2 with recurrent hypoglycemia-requiring D5W-POA   hypokalemia-POA   Hypothyroidism-POA  Acute transaminitis-POA, resolving   Chronic pain syndrome-on chronic opioid medication-POA   Lumbar foraminal stenosis-chronic-POA   Grade 1 diastolic dysfunction-POA   Weakness-POA   History of gastric bypass-POA    PLAN:  Cardiology Services have evaluated the patient appreciate assistance and recommendations   Neurosurgery Services have evaluated the patient appreciate and recommendations Monitor blood pressure closely patient has been weaned off vasopressor therapy   Monitor glucose levels with hyperglycemia give glucagon and D50 as needed   Resume home medication as deemed necessary   Fall precautions   Continue with IV hydration   PPI therapy         VTE Prophylaxis:  Lovenox  for DVT prophylaxis and will be advised to be as mobile as possible and sit in a chair as tolerated    Patient condition:  Stable  __________________________________________________________________________  INPATIENT LIST OF MEDICATIONS     Scheduled Meds:   [START ON 3/16/2025] cariprazine  1.5 mg Oral QHS    enoxparin  40 mg Subcutaneous Daily    famotidine  20 mg Oral Daily    hydrocortisone sodium succinate  100 mg Intravenous Q8H    levothyroxine  125 mcg Oral Before breakfast    lisdexamfetamine  70 mg Oral Daily    mupirocin   Nasal BID     Continuous Infusions:  PRN Meds:.  Current Facility-Administered Medications:     albuterol, 2 puff, Inhalation, Q6H PRN    aluminum-magnesium hydroxide-simethicone, 30 mL, Oral, Q6H PRN    HYDROcodone-acetaminophen, 2 tablet, Oral, Q6H PRN    sodium chloride 0.9%, 10 mL, Intravenous, PRN    zolpidem, 5 mg, Oral, Nightly PRN      IVanessa FNP have reviewed and discussed the case with   Dr. Kristina Rodríguez.  Please see the following addendum for further assessment and plan from their attending MD.This note was created with a assistance of electronic voice recognition software.  There may be transcription errors as a result of using this technology however minimal; and effort has been made to assure accuracy of the transcription but any obvious areas or admissions should be clarified with the author of the document     CATIE Toussaint   03/15/2025    ________________________________________________________________________________    MD Addendum:  I, Dr.amusa kramer ---assumed care of this patient today  For the patient encounter, I performed the substantive portion of the visit, I reviewed the NP/PA documentation, treatment plan, and medical decision making.  I had face to face time with this patient     A. History:  49 y.o. female who  has a past medical history of ADHD (attention deficit hyperactivity disorder), Anxiety, Asthma, Depression, Diabetes  mellitus, Encounter for blood transfusion, Essential hypertension (02/22/2022), Hypothyroidism (02/04/2020), Kidney stone (02/08/2022), Migraine without status migrainosus, not intractable (02/04/2020), Rheumatoid arthritis involving both hands with positive rheumatoid factor (02/21/2018), T12 burst fracture, Tremors of nervous system (02/04/2020), and Type 2 diabetes mellitus (11/25/2024).. The patient presented to [facility] on 3/13/2025 with a primary complaint of  2 day hx of weakness, chest pain, and abdominal pain. Patient endorses daily vomiting and chronic diarrhea for 3 years. Troponin normal. CBC H/H 13.3/39.2, CMP with mild hypokalemia 3.1. TSH normal. CT abdomen and pelvis performed in ED with no acute abnormalities, MRI of thoracic and lumbar spine with posterior fusion of T11-L1 and moderate to severe bilateral neural foramen stenosis. During work up in ED, patient had multiple hypotensive episodes in 70s/40s, received 3L LR bolus within mild improvement, patient was started on levophed. Concurrently, patient also had hypoglycemic episode with CBGs in 20s, resolved with D50W in ED. Pt was admitted ICU unit on vasopressor therapy. Her blood pressure has improved and weaned off vasopressor therapy. PT has been cleared for transfer from ICU to the floor. Hospital medicine services have been consulted for assumption of care.       ASSESSMENT & PLAN:   Acute on no pain generalized-POA   Hypotensive shock-requiring vasopressor therapy-POA   Dm type 2 with recurrent hypoglycemia-requiring D5W-POA   hypokalemia-POA   Hypothyroidism-POA  Acute transaminitis-POA, resolving   Chronic pain syndrome-on chronic opioid medication-POA   Lumbar foraminal stenosis-chronic-POA   Grade 1 diastolic dysfunction-POA   Weakness-POA   History of gastric bypass-POA     PLAN:  Cardiology Services have evaluated the patient appreciate assistance and recommendations   Neurosurgery Services have evaluated the patient appreciate and  recommendations Monitor blood pressure closely patient has been weaned off vasopressor therapy   Monitor glucose levels with hyperglycemia give glucagon and D50 as needed   Resume home medication as deemed necessary   Fall precautions   Continue with IV hydration   PPI therapy            VTE Prophylaxis:  Lovenox for DVT prophylaxis and will be advised to be as mobile as possible and sit in a chair as tolerated     Patient condition:  Stable    Discharge Planning and Disposition: No mobility needs. Ambulating well. Good social support system.   Anticipated discharge    All diagnosis and differential diagnosis have been reviewed; assessment and plan has been documented; I have personally reviewed the labs and test results that are presently available; I have reviewed the patients medication list; I have reviewed the consulting providers response and recommendations. I have reviewed or attempted to review medical records based upon their availability.    All of the patient and family questions have been addressed and answered. Patient's is agreeable to the above stated plan. I will continue to monitor closely and make adjustments to medical management as needed.

## 2025-03-15 NOTE — PROGRESS NOTES
[facility] - [facility]  Pulmonary Critical Care Note    Patient Name: Tayla Lambert  MRN: [id number]  Admission Date: 3/13/2025  Hospital Length of Stay: 1 days  Code Status: Full Code  Attending Provider: Alphonse High MD  Primary Care Provider: Iliana Merritt MD     Subjective:     HPI:   Tayla Lambert 49 y.o. female with pmhx of hypothyroidism, DM, HTN, hx of gastric bypass in 90s, hx of multiple back surgeries presented to ED for 2 day hx of weakness, chest pain, and abdominal pain. Patient endorses daily vomiting and chronic diarrhea for 3 years. Troponin normal. CBC H/H 13.3/39.2, CMP with mild hypokalemia 3.1. TSH normal. CT abdomen and pelvis performed in ED with no acute abnormalities, MRI of thoracic and lumbar spine with posterior fusion of T11-L1 and moderate to severe bilateral neural foramen stenosis. During work up in ED, patient had multiple hypotensive episodes in 70s/40s, received 3L LR bolus within mild improvement, ultimately requiring levophed. Concurrently, patient also had hypoglycemic episode with CBGs in 20s, resolved with D50W in ED. ICU consulted for observation with unknown source of hypoglycemia and hypotension.     Lactic acid normal, blood culture x2 pending.    Per chart review, patient has had chronic episodes of hypoglycemia in past, previous workup with normal ACTH stimulation. Multiple low A1c results. Multiple visits reporting possible poor oral intake or polypharmacy.  Hospital Course/Significant events:  3/14: admission to ICU on pressors for hypotension and hypoglycemia    24 Hour Interval History:  NAEO documented, VSS and afebrile. Levophed weaned off yesterday.   Neurosurgery consulted, no surgical intervention at this time   Cardiology consulted, check Echo and troponin trend. Troponin remained negative, Echo only consistent with grade 1 diastolic dysfunction.   CBG improved with Glucagon and stress dose steroids, most recently 120.    Transaminitis improving, doppler negative for portal vein thrombosis, hepatitis panel negative. Repleting PENNY ACOSTA x2 negative for growth to date   Continues to complain of diffuse chronic pain, weakness appears functional. Denies bowel and urinary incontinence.     Past Medical History:   Diagnosis Date    ADHD (attention deficit hyperactivity disorder)     Anxiety     Asthma     Depression     Diabetes mellitus     Encounter for blood transfusion     Essential hypertension 2022    Hypothyroidism 2020    Kidney stone 2022    Migraine without status migrainosus, not intractable 2020    Rheumatoid arthritis involving both hands with positive rheumatoid factor 2018    T12 burst fracture     Tremors of nervous system 2020    Type 2 diabetes mellitus 2024       Past Surgical History:   Procedure Laterality Date    ABDOMINAL SURGERY      APPENDECTOMY      BACK SURGERY  2018    T11 to L1 PSF w/T12 laminectomy     SECTION      CHOLECYSTECTOMY      ESOPHAGOGASTRODUODENOSCOPY N/A 12/10/2024    Procedure: EGD (ESOPHAGOGASTRODUODENOSCOPY);  Surgeon: Hua Ibarra MD;  Location: [facility] ([address] [place] [address]);  Service: Gastroenterology;  Laterality: N/A;    GASTRIC BYPASS      HYSTERECTOMY      TONSILLECTOMY         Social History[1]        Current Outpatient Medications   Medication Instructions    acetaminophen (TYLENOL) 500 mg, Oral, Every 6 hours PRN    albuterol (PROVENTIL/VENTOLIN HFA) 90 mcg/actuation inhaler 2 puffs, Inhalation, Every 6 hours PRN, Rescue    cetirizine (ZYRTEC) 10 MG tablet 1 tablet, Every morning    cyanocobalamin, vitamin B-12, (B-12 KIT INJ) 1,000 mcg, Injection, Weekly    divalproex ER (DEPAKOTE ER) 250 mg, Oral, Daily    ergocalciferol (VITAMIN D2) 50,000 Units, Every 7 days    estradioL (ESTRACE) 1 mg, Oral, Daily    FLUoxetine 20 MG capsule 1 capsule, Daily    fluticasone propionate (FLONASE) 50 mcg/actuation nasal spray 1 spray    gabapentin  (NEURONTIN) 300 mg, Oral, 3 times daily    HYDROcodone-acetaminophen (NORCO)  mg per tablet 1 tablet, Oral, Every 6 hours PRN    hydrOXYzine (ATARAX) 50 mg, Every 6 hours PRN    hyoscyamine 0.125 mg, Sublingual, Every 4 hours PRN    levothyroxine (SYNTHROID) 125 mcg, Oral, Before breakfast    lurasidone (LATUDA) 60 mg Tab tablet 1 tablet, Daily    olmesartan (BENICAR) 40 mg, Oral, Daily    ondansetron (ZOFRAN) 4 mg, Oral, Every 8 hours PRN    pantoprazole (PROTONIX) 40 MG tablet Take 1 tablet (40 mg total) by mouth 2 (two) times daily for 56 days, THEN 1 tablet (40 mg total) once daily.    polyethylene glycol (GLYCOLAX) 17 g, Oral, Daily    promethazine (PHENERGAN) 25 MG tablet 1 tablet, Every 4 hours    temazepam (RESTORIL) 15 mg, Oral, Nightly PRN    tirzepatide 15 mg, Subcutaneous, Every 7 days    topiramate (TOPAMAX) 200 mg, Oral, 2 times daily    triamcinolone acetonide 0.1% (KENALOG) 0.1 % cream Topical (Top), 2 times daily, For eczema flares. Do not use for more than 14 days at a time.    TRINTELLIX 20 mg, Oral, Nightly    valACYclovir (VALTREX) 1,000 mg, Oral, 2 times daily, For 5 days with outbreaks.    VRAYLAR 1.5 mg, Oral, Nightly    VYVANSE 70 mg, Oral, Every morning       Review of patient's allergies indicates:  No Known Allergies     Current Inpatient Medications   cariprazine  1.5 mg Oral Daily    enoxparin  40 mg Subcutaneous Daily    famotidine  20 mg Oral Daily    hydrocortisone sodium succinate  100 mg Intravenous Q8H    levothyroxine  125 mcg Oral Before breakfast    lisdexamfetamine  70 mg Oral Daily    mupirocin   Nasal BID       Current Intravenous Infusions   lactated ringers   Intravenous Continuous   Stopped at 03/14/25 1420    NORepinephrine bitartrate-D5W  0-3 mcg/kg/min (Dosing Weight) Intravenous Continuous   Stopped at 03/14/25 1420         Review of Systems   Constitutional:  Positive for chills and weight loss. Negative for fever.   Respiratory:  Negative for cough and  shortness of breath.    Cardiovascular:  Negative for chest pain and leg swelling.   Gastrointestinal:  Positive for diarrhea and nausea. Negative for vomiting.   Genitourinary:  Negative for dysuria.   Neurological:  Positive for weakness. Negative for dizziness and headaches.          Objective:       Intake/Output Summary (Last 24 hours) at 3/15/2025 0828  Last data filed at 3/15/2025 0600  Gross per 24 hour   Intake 994.93 ml   Output 2850 ml   Net -1855.07 ml         Vital Signs (Most Recent):  Temp: 98.5 °F (36.9 °C) (03/15/25 0400)  Pulse: 68 (03/15/25 0600)  Resp: 15 (03/15/25 0613)  BP: 107/74 (03/15/25 0600)  SpO2: 97 % (03/15/25 0600)  Body mass index is 25.5 kg/m².  Weight: 69.5 kg (153 lb 3.5 oz) Vital Signs (24h Range):  Temp:  [98.3 °F (36.8 °C)-98.7 °F (37.1 °C)] 98.5 °F (36.9 °C)  Pulse:  [] 68  Resp:  [10-23] 15  SpO2:  [90 %-100 %] 97 %  BP: ()/(57-87) 107/74     Physical Exam  Vitals and nursing note reviewed.   Constitutional:       Appearance: She is ill-appearing. She is not toxic-appearing.      Comments: Somnolent, arousal with voice, answers questions appropriately   HENT:      Head: Normocephalic and atraumatic.      Nose: Nose normal.      Mouth/Throat:      Mouth: Mucous membranes are moist.   Eyes:      Extraocular Movements: Extraocular movements intact.      Pupils: Pupils are equal, round, and reactive to light.   Cardiovascular:      Rate and Rhythm: Normal rate and regular rhythm.      Pulses: Normal pulses.      Heart sounds: Normal heart sounds. No murmur heard.  Pulmonary:      Effort: Pulmonary effort is normal. No respiratory distress.      Breath sounds: Normal breath sounds. No wheezing or rales.   Abdominal:      General: Abdomen is flat. Bowel sounds are normal.      Palpations: Abdomen is soft. There is no mass.      Tenderness: There is abdominal tenderness (tenderness to palpation on lower abdominal exam and epigastric).   Musculoskeletal:         General:  No swelling or tenderness.      Cervical back: Normal range of motion.      Right lower leg: No edema.      Left lower leg: No edema.   Lymphadenopathy:      Cervical: No cervical adenopathy.   Skin:     Capillary Refill: Capillary refill takes less than 2 seconds.      Coloration: Skin is pale.      Comments: Cool, dry   Neurological:      General: No focal deficit present.      Mental Status: She is oriented to person, place, and time.           Lines/Drains/Airways       Drain  Duration             Female External Urinary Catheter w/ Suction 03/14/25 0215 1 day              Peripheral Intravenous Line  Duration                  Peripheral IV - Single Lumen 03/14/25 1530 20 G Anterior;Left;Proximal Forearm <1 day                    Significant Labs:    Lab Results   Component Value Date    WBC 6.03 03/15/2025    HGB 12.5 03/15/2025    HCT 36.8 (L) 03/15/2025    MCV 90.9 03/15/2025     03/15/2025           BMP  Lab Results   Component Value Date     03/15/2025    K 3.4 (L) 03/15/2025    CO2 21 (L) 03/15/2025    BUN 7.6 (L) 03/15/2025    CREATININE 0.62 03/15/2025    CALCIUM 8.4 03/15/2025    AGAP 9.0 03/15/2025    ESTGFRAFRICA >60.0 01/05/2022    EGFRNONAA >60.0 01/05/2022         ABG  Recent Labs   Lab 03/14/25  0825   PH 7.420   PO2 93.0   PCO2 40.0   HCO3 25.9   POCBASEDEF 1.30       Mechanical Ventilation Support:         Significant Imaging:  I have reviewed the pertinent imaging within the past 24 hours.    EXAMINATION:  XR CHEST 1 VIEW     CLINICAL HISTORY:  Chest pain, unspecified     TECHNIQUE:  One view     COMPARISON:  March 5, 2025..     FINDINGS:  Cardiopericardial silhouette is within normal limits. Lungs are without dense focal or segmental consolidation, congestive process, pleural effusions or pneumothorax.  Scoliosis and thoracolumbar vertebrae prior fusions.     Impression:     No acute cardiopulmonary process identified.        Electronically signed by:Bradley Granados  Date:                                             03/13/2025  Time:                                           11:06    EXAMINATION:  CT ABDOMEN PELVIS WITH IV CONTRAST     CLINICAL HISTORY:  Abdominal pain, acute, nonlocalized;  chest pain, generalized weakness, difficulty walking.  Abdominal pain     TECHNIQUE:  Helically acquired images with axial, sagittal and coronal reformations were obtained from the lung bases to the pubic symphysis after the IV administration of contrast.     Automated tube current modulation, weight-based exposure dosing, and/or iterative reconstruction technique utilized to reach lowest reasonably achievable exposure rate.     DLP: 699 mGy*cm     COMPARISON:  CT abdomen pelvis 02/05/2025     FINDINGS:  HEART: Normal in size. No pericardial effusion.     LUNG BASES: Well aerated.     LIVER: Normal attenuation. No appreciable focal hepatic lesion.     BILIARY: Gallbladder is surgically absent.     PANCREAS: No inflammatory change.     SPLEEN: Normal in size     ADRENALS: No mass.     KIDNEYS/URETERS: The kidneys enhance symmetrically.  No hydronephrosis.     GI TRACT/MESENTERY: There appear to be postsurgical changes related to prior bariatric surgery.  No evidence of bowel obstruction.     Paucity of intra-abdominal fat mildly limits evaluation.     PERITONEUM: No free fluid.No free air.     LYMPH NODES: No enlarged lymph nodes by size criteria.     VASCULATURE: No significant atherosclerosis or aneurysm.     BLADDER: Normal appearance given degree of distention.     REPRODUCTIVE ORGANS: Uterus is surgically absent.     SOFT TISSUES: Unremarkable.     BONES: Postsurgical change the lumbar spinal fusion stabilizing T12 fracture.  Appearance similar to prior.  Degenerative change at the lumbar spine.     Impression:     1. No appreciable acute intra-abdominal abnormality by CT evaluation        Electronically signed by:Nila Pérez  Date:                                            03/13/2025  Time:                                            16:37    START OF REPORT:  Technique: Multiplanar multisequence MRI of the thoracic spine without contrast was performed in neutral position.     Comparison: None.     Clinical History: Myelopathy, acute, thoracic spine.     Findings: The study is severely limited due to motion artifact as well as pronounced metallic artifact related to dorsal spine orthopedic hardware near the thoracolumbar junction.  Spine: There is a incompletely imaged chronic fracture involving the T12 vertebral body with mild retropulsion of the posterior cortex causing mild spinal canal stenosis with no definitive associated cord compression as seen on series 4 image 10. This fracture is stabilized by a posterior fusion hardware seen at T11 down to L1 levels.  Spinal cord: No definitive abnormal cord signal is identified on this limited study.  Anatomy: Normal.  Bone and marrow degenerative changes: Normal.  bone marrow, and disc integrity: There is no significant disc bulge identified.        Impression:  1. There is a incompletely imaged chronic fracture involving the T12 vertebral body with mild retropulsion of the posterior cortex causing mild spinal canal stenosis with no definitive associated cord compression as seen on series 4 image 10. This fracture is stabilized by a posterior fusion hardware seen at T11 down to L1 levels. Correlate with clinical and laboratory findings regards additional evaluation and follow-up as clinically indicated.  2. No definitive abnormal cord signal is identified on this limited study.  3. Details and other findings as discussed.        This result has not been signed. Information might be incomplete.  Exam Ended: 03/13/25 21:32 CDT Last Resulted: 03/13/25 21:32 CDT    START OF REPORT:  Technique: Standard axial sagittal and coronal lumbar spine MRI sequences were performed without contrast.     Comparison: None.     Clinical history: Low back pain, cauda equina syndrome  suspected.     Findings:  Distal cord and conus medullaris: Spinal cord and conus medullaris are unremarkable.  Integrity of the bone, bone marrow and discs:  Bone: There is a chronic appearing coronally oriented fracture involving the T12 vertebral bodystabilized by a posterior fusion hardware at T11 down to L1 levels as detailed in the separate report for the thoracic spine.  Discs: There is multilevel disc desiccation and circumferential disc bulges at L1-L2 down to L4-L5 levels causing varying degrees of moderate to severe bilateral neural foraminal stenosis most severe at the right L3-L4 neural foramen where there is severe stenosis and compression of the exiting nerve root as seen on series 12 image 11-19.        Impression:  1. There is multilevel disc desiccation and circumferential disc bulges at L1-L2 down to L4-L5 levels causing varying degrees of moderate to severe bilateral neural foraminal stenosis most severe at the right L3-L4 neural foramen where there is severe stenosis and compression of the exiting nerve root as seen on series 12 image 11-19. Correlate with clinical and laboratory findings regards additional evaluation and follow-up.  2. Details and other findings, as described above.        This result has not been signed. Information might be incomplete.  Exam Ended: 03/13/25 21:39 CDT Last Resulted: 03/13/25 21:39 CDT  Assessment/Plan:     Assessment  Recurrent Hypotension requiring pressors - resolved on this admission. Previous endocrine work-up negative.   Recurrent Hypoglycemia - resolved w glucagon x 1 D5 and stress dose steroids. Previous endocrine work-up negative.    Hypokalemia - persistent   Hx of Gastric Bypass in 90s   Acute Transaminitis - improving. Imaging and u/s negative for acute abnormalities. EtOH, Acetaminophen, Salicylates, Hepatitis panel negative.   Intermittent Bradycardia - Cardiology consulted.   Grade 1 diastolic dysfunction   Chronic Pain Syndrome - on chronic  opioids.   Severe B/L lumbar foraminal stenosis - Not consistent with cauda equina. NS consulted, no intervention at this time.   ADHD on Vyvanse       Plan  Downgrade to floor today as she has weaned off of Levophed     Code Status: FULL   Abx: None   IVF: LR 100cc/hr   DVT Prophylaxis: Lovenox   GI Prophylaxis: Pepcid     32 minutes of critical care was time spent personally by me on the following activities: development of treatment plan with patient or surrogate and bedside caregivers, discussions with consultants, evaluation of patient's response to treatment, examination of patient, ordering and performing treatments and interventions, ordering and review of laboratory studies, ordering and review of radiographic studies, pulse oximetry, re-evaluation of patient's condition.  This critical care time did not overlap with that of any other provider or involve time for any procedures.     Allegra Perry MD  Pulmonary Critical Care Medicine  [facility] - [facility]  DOS: 03/15/2025          [1]   Social History  Socioeconomic History    Marital status: Single   Tobacco Use    Smoking status: Never    Smokeless tobacco: Never   Substance and Sexual Activity    Alcohol use: Not Currently    Drug use: Never    Sexual activity: Not Currently     Social Drivers of Health     Financial Resource Strain: Low Risk  (3/14/2025)    Overall Financial Resource Strain (CARDIA)     Difficulty of Paying Living Expenses: Not hard at all   Food Insecurity: No Food Insecurity (3/14/2025)    Hunger Vital Sign     Worried About Running Out of Food in the Last Year: Never true     Ran Out of Food in the Last Year: Never true   Recent Concern: Food Insecurity - Food Insecurity Present (3/7/2025)    Hunger Vital Sign     Worried About Running Out of Food in the Last Year: Sometimes true     Ran Out of Food in the Last Year: Sometimes true   Transportation Needs: Unmet Transportation Needs (3/7/2025)    PRAPARE -  Transportation     Lack of Transportation (Medical): Yes     Lack of Transportation (Non-Medical): No   Physical Activity: Inactive (3/14/2025)    Exercise Vital Sign     Days of Exercise per Week: 0 days     Minutes of Exercise per Session: 0 min   Stress: No Stress Concern Present (3/14/2025)    Citizen of [place] of [employer] - Occupational Stress Questionnaire     Feeling of Stress : Not at all   Recent Concern: Stress - Stress Concern Present (3/7/2025)    Citizen of [place] of [employer]ational [employer] - Occupational Stress Questionnaire     Feeling of Stress : Very much   Housing Stability: Low Risk  (3/14/2025)    Housing Stability Vital Sign     Unable to Pay for Housing in the Last Year: No     Homeless in the Last Year: No

## 2025-03-16 VITALS
SYSTOLIC BLOOD PRESSURE: 112 MMHG | WEIGHT: 146.31 LBS | DIASTOLIC BLOOD PRESSURE: 71 MMHG | TEMPERATURE: 98 F | RESPIRATION RATE: 18 BRPM | BODY MASS INDEX: 24.38 KG/M2 | HEIGHT: 65 IN | OXYGEN SATURATION: 98 % | HEART RATE: 57 BPM

## 2025-03-16 LAB
ALBUMIN SERPL-MCNC: 3.1 G/DL (ref 3.5–5)
ALBUMIN/GLOB SERPL: 1.2 RATIO (ref 1.1–2)
ALP SERPL-CCNC: 149 UNIT/L (ref 40–150)
ALT SERPL-CCNC: 159 UNIT/L (ref 0–55)
ANION GAP SERPL CALC-SCNC: 7 MEQ/L
AST SERPL-CCNC: 74 UNIT/L (ref 5–34)
BASOPHILS # BLD AUTO: 0.03 X10(3)/MCL
BASOPHILS NFR BLD AUTO: 0.6 %
BILIRUB SERPL-MCNC: 0.3 MG/DL
BUN SERPL-MCNC: 10.5 MG/DL (ref 9.8–20.1)
CALCIUM SERPL-MCNC: 8.6 MG/DL (ref 8.4–10.2)
CHLORIDE SERPL-SCNC: 109 MMOL/L (ref 98–107)
CO2 SERPL-SCNC: 25 MMOL/L (ref 22–29)
CREAT SERPL-MCNC: 0.72 MG/DL (ref 0.55–1.02)
CREAT/UREA NIT SERPL: 15
EOSINOPHIL # BLD AUTO: 0.02 X10(3)/MCL (ref 0–0.9)
EOSINOPHIL NFR BLD AUTO: 0.4 %
ERYTHROCYTE [DISTWIDTH] IN BLOOD BY AUTOMATED COUNT: 13.4 % (ref 11.5–17)
GFR SERPLBLD CREATININE-BSD FMLA CKD-EPI: >60 ML/MIN/1.73/M2
GLOBULIN SER-MCNC: 2.6 GM/DL (ref 2.4–3.5)
GLUCOSE SERPL-MCNC: 87 MG/DL (ref 74–100)
HCT VFR BLD AUTO: 37.5 % (ref 37–47)
HGB BLD-MCNC: 12.2 G/DL (ref 12–16)
IMM GRANULOCYTES # BLD AUTO: 0.01 X10(3)/MCL (ref 0–0.04)
IMM GRANULOCYTES NFR BLD AUTO: 0.2 %
LYMPHOCYTES # BLD AUTO: 1.7 X10(3)/MCL (ref 0.6–4.6)
LYMPHOCYTES NFR BLD AUTO: 32.4 %
MAGNESIUM SERPL-MCNC: 2.1 MG/DL (ref 1.6–2.6)
MCH RBC QN AUTO: 30.4 PG (ref 27–31)
MCHC RBC AUTO-ENTMCNC: 32.5 G/DL (ref 33–36)
MCV RBC AUTO: 93.5 FL (ref 80–94)
MONOCYTES # BLD AUTO: 0.42 X10(3)/MCL (ref 0.1–1.3)
MONOCYTES NFR BLD AUTO: 8 %
NEUTROPHILS # BLD AUTO: 3.07 X10(3)/MCL (ref 2.1–9.2)
NEUTROPHILS NFR BLD AUTO: 58.4 %
NRBC BLD AUTO-RTO: 0 %
PHOSPHATE SERPL-MCNC: 3.3 MG/DL (ref 2.3–4.7)
PLATELET # BLD AUTO: 245 X10(3)/MCL (ref 130–400)
PMV BLD AUTO: 9.8 FL (ref 7.4–10.4)
POCT GLUCOSE: 77 MG/DL (ref 70–110)
POTASSIUM SERPL-SCNC: 3.7 MMOL/L (ref 3.5–5.1)
PROT SERPL-MCNC: 5.7 GM/DL (ref 6.4–8.3)
RBC # BLD AUTO: 4.01 X10(6)/MCL (ref 4.2–5.4)
SODIUM SERPL-SCNC: 141 MMOL/L (ref 136–145)
WBC # BLD AUTO: 5.25 X10(3)/MCL (ref 4.5–11.5)

## 2025-03-16 PROCEDURE — 25000003 PHARM REV CODE 250

## 2025-03-16 PROCEDURE — 83735 ASSAY OF MAGNESIUM: CPT

## 2025-03-16 PROCEDURE — 84100 ASSAY OF PHOSPHORUS: CPT

## 2025-03-16 PROCEDURE — 63600175 PHARM REV CODE 636 W HCPCS: Performed by: INTERNAL MEDICINE

## 2025-03-16 PROCEDURE — 25000003 PHARM REV CODE 250: Performed by: INTERNAL MEDICINE

## 2025-03-16 PROCEDURE — 36415 COLL VENOUS BLD VENIPUNCTURE: CPT

## 2025-03-16 PROCEDURE — 80053 COMPREHEN METABOLIC PANEL: CPT

## 2025-03-16 PROCEDURE — 85025 COMPLETE CBC W/AUTO DIFF WBC: CPT

## 2025-03-16 PROCEDURE — 25000003 PHARM REV CODE 250: Performed by: NURSE PRACTITIONER

## 2025-03-16 RX ORDER — ZOLPIDEM TARTRATE 5 MG/1
5 TABLET ORAL NIGHTLY PRN
Status: COMPLETED | OUTPATIENT
Start: 2025-03-16 | End: 2025-03-16

## 2025-03-16 RX ORDER — HYDROCODONE BITARTRATE AND ACETAMINOPHEN 10; 325 MG/1; MG/1
1 TABLET ORAL EVERY 6 HOURS PRN
Qty: 28 TABLET | Refills: 0 | Status: SHIPPED | OUTPATIENT
Start: 2025-03-16 | End: 2025-03-23

## 2025-03-16 RX ADMIN — FAMOTIDINE 20 MG: 20 TABLET, FILM COATED ORAL at 09:03

## 2025-03-16 RX ADMIN — LEVOTHYROXINE SODIUM 125 MCG: 125 TABLET ORAL at 06:03

## 2025-03-16 RX ADMIN — HYDROCODONE BITARTRATE AND ACETAMINOPHEN 2 TABLET: 5; 325 TABLET ORAL at 09:03

## 2025-03-16 RX ADMIN — HYDROCODONE BITARTRATE AND ACETAMINOPHEN 2 TABLET: 5; 325 TABLET ORAL at 03:03

## 2025-03-16 RX ADMIN — HYDROCORTISONE SODIUM SUCCINATE 100 MG: 100 INJECTION, POWDER, FOR SOLUTION INTRAMUSCULAR; INTRAVENOUS at 06:03

## 2025-03-16 RX ADMIN — ZOLPIDEM TARTRATE 5 MG: 5 TABLET, FILM COATED ORAL at 12:03

## 2025-03-16 NOTE — DISCHARGE SUMMARY
[facility] Hospital Medicine Discharge Summary    Admit Date: 3/13/2025  Discharge Date and Time: 3/16/257387:21 AM  Admitting Physician:  Team  Discharging Physician: Kristina Rodríguez MD.  Primary Care Physician: Iliana Merritt MD  Consults: {consultation: [id number]}    Discharge Diagnoses:  ***    Hospital Course:   ***  Pt was seen and examined on the day of discharge  Vitals:  VITAL SIGNS: 24 HRS MIN & MAX LAST   Temp  Min: 97.6 °F (36.4 °C)  Max: 98.8 °F (37.1 °C) 97.6 °F (36.4 °C)   BP  Min: 112/71  Max: 133/82 112/71   Pulse  Min: 57  Max: 145  (!) 57   Resp  Min: 10  Max: 24 18   SpO2  Min: 96 %  Max: 100 % 98 %       Physical Exam:  ***    Procedures Performed: No admission procedures for hospital encounter.     Significant Diagnostic Studies: See Full reports for all details    Recent Labs   Lab 03/14/25  0606 03/15/25  0323 03/16/25  0651   WBC 4.22* 6.03 5.25   RBC 4.04* 4.05* 4.01*   HGB 12.3 12.5 12.2   HCT 37.3 36.8* 37.5   MCV 92.3 90.9 93.5   MCH 30.4 30.9 30.4   MCHC 33.0 34.0 32.5*   RDW 13.1 13.0 13.4    245 245   MPV 9.9 9.7 9.8       Recent Labs   Lab 03/14/25  0417 03/14/25  0825 03/14/25  1207 03/15/25  0323 03/16/25  0651     --   --  141 141   K 2.9*  --  3.8 3.4* 3.7   *  --   --  111* 109*   CO2 25  --   --  21* 25   BUN 10.9  --   --  7.6* 10.5   CREATININE 0.79  --   --  0.62 0.72   CALCIUM 8.2*  --   --  8.4 8.6   PH  --  7.420  --   --   --    MG 2.00  --   --  1.90 2.10   ALBUMIN 3.0*  --   --  3.0* 3.1*   ALKPHOS 195*  --   --  170* 149   *  --   --  252* 159*   *  --   --  170* 74*   BILITOT 0.3  --   --  0.3 0.3        Microbiology Results (last 7 days)       Procedure Component Value Units Date/Time    Blood culture #2 **CANNOT BE ORDERED STAT** [[id number]]  (Normal) Collected: 03/14/25 0158    Order Status: Completed Specimen: Blood Updated: 03/16/25 0300     Blood Culture No Growth At 48 Hours    Blood  culture #1 **CANNOT BE ORDERED STAT** [[id number]]  (Normal) Collected: 03/14/25 0158    Order Status: Completed Specimen: Blood Updated: 03/16/25 0300     Blood Culture No Growth At 48 Hours             Echo Saline Bubble? No    Left Ventricle: The left ventricle is normal in size. Normal wall   thickness. There is normal systolic function with a visually estimated   ejection fraction of 55 - 60%. Grade I diastolic dysfunction.    Right Ventricle: The right ventricle is normal in size. Systolic   function is normal. TAPSE is 2.17 cm.    Mitral Valve: There is trace regurgitation.    Tricuspid Valve: There is trace regurgitation.    Pulmonary Artery: The estimated pulmonary artery systolic pressure is   16 mmHg.    IVC/SVC: Normal venous pressure at 3 mmHg.    Pericardium: There is no pericardial effusion.  MRI Thoracic Spine Without Contrast  Narrative: EXAMINATION:  MRI THORACIC SPINE WITHOUT CONTRAST    CLINICAL HISTORY:  Myelopathy, acute, thoracic spine;    TECHNIQUE:  Multiplanar multisequence MR images of the thoracic spine are obtained without contrast.    COMPARISON:  X-rays dated 06/03/2018    FINDINGS:  There is a rightward curvature of the thoracic spine there are postoperative changes posterior spinal fusion hardware at T11-L1.  There is a chronic T12 compression deformity with mild loss of height.  There is approximately 4 mm of bony retropulsion with mild narrowing of the spinal canal.  The remaining vertebral body heights are preserved.  There is no bone marrow edema.    There is no cord signal abnormality.  There is no epidural fluid collection.    Central disc protrusion at T9-T10 measures 3 mm in AP dimension with minimal narrowing of the spinal canal.  The spinal canal and neural foramina are otherwise patent.    There are postoperative changes the paraspinal soft tissues.  The soft tissues are otherwise unremarkable.  Impression: 1. No cord signal abnormality.  2. Chronic T12 compression  deformity with loss of height, bony retropulsion and mild narrowing of the spinal canal.  3. Small central disc protrusion at T9-T10 with minimal narrowing of the canal.  No significant change from the [facility] interpretation    Electronically signed by: Thelma Leroy  Date:    03/14/2025  Time:    11:14  MRI Lumbar Spine Without Contrast  Narrative: EXAMINATION:  MRI LUMBAR SPINE WITHOUT CONTRAST    CLINICAL HISTORY:  Low back pain, cauda equina syndrome suspected;    TECHNIQUE:  Multiplanar multisequence MR images of the lumbar spine are obtained without contrast.    COMPARISON:  CT abdomen pelvis dated 03/13/2025    FINDINGS:  There are postoperative changes posterior spinal fusion hardware at T11 through L1, with chronic T12 compression deformity.  There is a leftward curvature of the lumbar spine with grade 1 retrolisthesis of L2 and L3.  The lumbar vertebral body heights are preserved.  There are degenerative endplate changes with reactive endplate edema at L3-L4.    The conus terminates at the level of L1.  It is normal in signal and contour.  There is no epidural fluid collection.    Disc spaces, spinal canal and neural foramina are as follows:    L1-L2: Disc bulge and facet hypertrophy with mild narrowing of the spinal canal.  Mild left neural foraminal stenosis.    L2-L3: Grade 1 retrolisthesis of L2 over L3 with disc bulge, facet hypertrophy and mild narrowing of the spinal canal.  Mild left neural foraminal stenosis.    L3-L4: Grade 1 retrolisthesis of L3 over L4 with disc bulge, facet hypertrophy and mild narrowing of the spinal canal.  Moderate right and mild left neural foraminal stenosis.    L4-L5: Disc bulge and facet hypertrophy with mild narrowing of the spinal canal.  Mild bilateral neural foraminal stenosis.    L5-S1: No disc herniation.  Bilateral facet hypertrophy.  No significant spinal canal or neural foraminal stenosis.    No significant abnormality within the visualized paraspinous  musculature.  Impression: 1. Mild degenerative narrowing the spinal canal at L1-L5.  2. Multilevel neural foraminal stenoses as described.  No significant change from the [facility] interpretation.    Electronically signed by: Thelma Leroy  Date:    03/14/2025  Time:    11:03  US Abdomen Limited  Narrative: EXAMINATION:  US ABDOMEN LIMITED    CLINICAL HISTORY:  rule out portal vein thrombosis;    COMPARISON:  12/23/2024    FINDINGS:  Pancreas is not visualized due to overlying bowel gas.  The liver is incompletely visualized and measures 14.6 cm a focal lesion is not demonstrated.  There is hepatopetal flow in the portal vein.  Common bile duct measures 5 mm.    The right kidney measures 10.5 x 5.1 by 4 cm there are no focal lesions noted there is no hydronephrosis.  Impression: Limited study.    No acute abnormalities are demonstrated.    Electronically signed by: Carroll Plata MD  Date:    03/14/2025  Time:    10:23         Medication List        CHANGE how you take these medications      HYDROcodone-acetaminophen  mg per tablet  Commonly known as: NORCO  Take 1 tablet by mouth every 6 (six) hours as needed for Pain.  What changed: reasons to take this            CONTINUE taking these medications      albuterol 90 mcg/actuation inhaler  Commonly known as: PROVENTIL/VENTOLIN HFA  Inhale 2 puffs into the lungs every 6 (six) hours as needed for Wheezing. Rescue     divalproex  MG 24 hr tablet  Commonly known as: DEPAKOTE ER  Take 1 tablet (250 mg total) by mouth once daily.     estradioL 1 MG tablet  Commonly known as: ESTRACE  Take 1 tablet (1 mg total) by mouth once daily.     FLUoxetine 20 MG capsule     gabapentin 300 MG capsule  Commonly known as: NEURONTIN  Take 1 capsule (300 mg total) by mouth 3 (three) times daily.     hydrOXYzine 50 MG tablet  Commonly known as: ATARAX     hyoscyamine 0.125 mg Subl  Place 1 tablet (0.125 mg total) under the tongue every 4 (four) hours as needed (abdominal  cramping).     levothyroxine 125 MCG tablet  Commonly known as: SYNTHROID     lurasidone 60 mg Tab tablet  Commonly known as: LATUDA     ondansetron 4 MG tablet  Commonly known as: ZOFRAN  Take 1 tablet (4 mg total) by mouth every 8 (eight) hours as needed for Nausea.     pantoprazole 40 MG tablet  Commonly known as: PROTONIX  Take 1 tablet (40 mg total) by mouth 2 (two) times daily for 56 days, THEN 1 tablet (40 mg total) once daily.  Start taking on: February 21, 2025     polyethylene glycol 17 gram Pwpk  Commonly known as: GLYCOLAX  Take 17 g by mouth once daily.     promethazine 25 MG tablet  Commonly known as: PHENERGAN     temazepam 15 mg Cap  Commonly known as: RESTORIL  Take 1 capsule (15 mg total) by mouth nightly as needed (insomnia).     tirzepatide 15 mg/0.5 mL Pnij  Inject 15 mg into the skin every 7 days.     topiramate 200 MG Tab  Commonly known as: TOPAMAX     triamcinolone acetonide 0.1% 0.1 % cream  Commonly known as: KENALOG  Apply topically 2 (two) times daily. For eczema flares. Do not use for more than 14 days at a time.     TRINTELLIX 20 mg Tab  Generic drug: vortioxetine  Take 1 tablet (20 mg total) by mouth every evening.     valACYclovir 1000 MG tablet  Commonly known as: VALTREX  Take 1 tablet (1,000 mg total) by mouth 2 (two) times daily. For 5 days with outbreaks.     VRAYLAR 1.5 mg Cap  Generic drug: cariprazine  Take 1 capsule (1.5 mg total) by mouth every evening.     VYVANSE 70 mg capsule  Generic drug: lisdexamfetamine  Take 1 capsule (70 mg total) by mouth every morning.            STOP taking these medications      acetaminophen 500 MG tablet  Commonly known as: TYLENOL     B-12 KIT INJ     cetirizine 10 MG tablet  Commonly known as: ZYRTEC     fluticasone propionate 50 mcg/actuation nasal spray  Commonly known as: FLONASE     olmesartan 40 MG tablet  Commonly known as: BENICAR     VITAMIN D2 50,000 unit Cap  Generic drug: ergocalciferol               Where to Get Your Medications         These medications were sent to [id number] - [place] - [address] [facility] AT [facility] () [address]  [address] [address]      Phone: [phone]   HYDROcodone-acetaminophen  mg per tablet          Explained in detail to the patient about the discharge plan, medications, and follow-up visits. Pt understands and agrees with the treatment plan  Discharge Disposition: Home or Self Care   Discharged Condition: stable  Diet-   Dietary Orders (From admission, onward)       Start     Ordered    03/14/25 1325  Dietary nutrition supplements TID; Boost Breeze - Any flavor  Continuous        Question Answer Comment   Frequency: TID    Select PO Supplement: Boost Breeze - Any flavor        03/14/25 1324    03/14/25 1200  Diet Bariatric Regular  Diet effective now         03/14/25 1200                   Medications Per DC med rec  Activities as tolerated   Follow-up Information       Sourav Burgess MD Follow up in 4 week(s).    Specialty: Neurosurgery  Why: Follow up in the neurosurgical clinic  Contact information:  [address] [place] [address] [address]  [place] [address]  [phone]               Iliana Merritt MD. Schedule an appointment as soon as possible for a visit in 2 week(s).    Specialties: Internal Medicine, Pediatrics  Contact information:  [address] [facility] [address] [phone]                           For further questions contact hospitalist office    Discharge time 33 minutes    For worsening symptoms, chest pain, shortness of breath, increased abdominal pain, high grade fever, stroke or stroke like symptoms, immediately go to the nearest Emergency Room or call 911 as soon as possible.      Kristina So M.D on 3/16/2025. at 10:21 AM.         every 7 days.     topiramate 200 MG Tab  Commonly known as: TOPAMAX     triamcinolone acetonide 0.1% 0.1 % cream  Commonly known as: KENALOG  Apply topically 2 (two) times daily. For eczema flares. Do not use for more than 14 days at a time.     TRINTELLIX 20 mg Tab  Generic drug: vortioxetine  Take 1 tablet (20 mg total) by mouth every evening.     valACYclovir 1000 MG tablet  Commonly known as: VALTREX  Take 1 tablet (1,000 mg total) by mouth 2 (two) times daily. For 5 days with outbreaks.     VRAYLAR 1.5 mg Cap  Generic drug: cariprazine  Take 1 capsule (1.5 mg total) by mouth every evening.     VYVANSE 70 mg capsule  Generic drug: lisdexamfetamine  Take 1 capsule (70 mg total) by mouth every morning.            STOP taking these medications      acetaminophen 500 MG tablet  Commonly known as: TYLENOL     B-12 KIT INJ     cetirizine 10 MG tablet  Commonly known as: ZYRTEC     fluticasone propionate 50 mcg/actuation nasal spray  Commonly known as: FLONASE     olmesartan 40 MG tablet  Commonly known as: BENICAR     VITAMIN D2 50,000 unit Cap  Generic drug: ergocalciferol               Where to Get Your Medications        These medications were sent to [id number] [place], [place] - [address] [facility] [address]   [address] [facility] [address]      Phone: [phone]   HYDROcodone-acetaminophen  mg per tablet          Explained in detail to the patient about the discharge plan, medications, and follow-up visits. Pt understands and agrees with the treatment plan  Discharge Disposition: Home or Self Care   Discharged Condition: stable  Diet-   Dietary Orders (From admission, onward)       Start     Ordered    03/14/25 1325  Dietary nutrition supplements TID; Boost Breeze - Any flavor  Continuous        Question Answer Comment   Frequency: TID    Select PO Supplement: Boost Breeze - Any flavor        03/14/25 1324    03/14/25 1200  Diet Bariatric Regular  Diet  effective now         03/14/25 1200                   Medications Per DC med rec  Activities as tolerated   Follow-up Information       Sourav Burgess MD Follow up in 4 week(s).    Specialty: Neurosurgery  Why: Follow up in the neurosurgical clinic  Contact information:  [address] [place] [address] [address] [place] [address]  [phone]               Iliana Merritt MD. Schedule an appointment as soon as possible for a visit in 2 week(s).    Specialties: Internal Medicine, Pediatrics  Contact information:  [address] [place] [address]  [phone]                           For further questions contact hospitalist office    Discharge time 33 minutes    For worsening symptoms, chest pain, shortness of breath, increased abdominal pain, high grade fever, stroke or stroke like symptoms, immediately go to the nearest Emergency Room or call 911 as soon as possible.      Kristina So M.D on 3/16/2025. at 10:21 AM.

## 2025-03-16 NOTE — PLAN OF CARE
03/16/25 1243   Final Note   Assessment Type Final Discharge Note   Anticipated Discharge Disposition Home-Health   Post-Acute Status   Post-Acute Authorization Home Health   Home Health Status Set-up Complete/Auth obtained   Coverage [facility]   Discharge Delays None known at this time

## 2025-03-16 NOTE — PLAN OF CARE
SSC spoke to pt and she agrees with needing hh services and foc is to any company within her insurance network     SSC sent new referral via [facility] to [facility]- notified Asiya

## 2025-03-17 ENCOUNTER — PATIENT OUTREACH (OUTPATIENT)
Dept: ADMINISTRATIVE | Facility: CLINIC | Age: 50
End: 2025-03-17
Payer: COMMERCIAL

## 2025-03-17 LAB
PLPETH BLD-MCNC: <10 NG/ML
POPETH BLD-MCNC: <10 NG/ML
TOXICOLOGIST REVIEW: NORMAL

## 2025-03-17 NOTE — PROGRESS NOTES
C3 nurse attempted to contact Tayla Lambert for a TCC post hospital discharge follow up call. Patient answered and requested a call back this afternoon. The patient does not have a scheduled HOSFU appointment. Message sent to PCP staff for assistance with scheduling visit with patient.

## 2025-03-17 NOTE — PROGRESS NOTES
2nd attempt C3 nurse attempted to contact Tayla Lambert for a TCC post hospital discharge follow up call. Patient answered and requested a call back in the morning. The patient does not have a scheduled HOSFU appointment. Message previously sent to PCP staff for assistance with scheduling visit with patient.

## 2025-03-18 ENCOUNTER — PATIENT MESSAGE (OUTPATIENT)
Dept: ADMINISTRATIVE | Facility: CLINIC | Age: 50
End: 2025-03-18
Payer: COMMERCIAL

## 2025-03-18 LAB — PATH REV: NORMAL

## 2025-03-18 NOTE — PROGRESS NOTES
3rd attempt C3 nurse attempted to contact Tayla Lambert for a TCC post hospital discharge follow up call. No answer. Voicemail left with instructions to return call. The patient does not have a scheduled HOSFU appointment. Message previously sent to PCP staff for assistance with scheduling visit with patient.

## 2025-03-19 LAB
BACTERIA BLD CULT: NORMAL
BACTERIA BLD CULT: NORMAL

## 2025-05-09 ENCOUNTER — TELEPHONE (OUTPATIENT)
Dept: HEPATOLOGY | Facility: CLINIC | Age: 50
End: 2025-05-09
Payer: COMMERCIAL

## 2025-05-09 NOTE — TELEPHONE ENCOUNTER
Call returned to the patient at [phone].  Patient informed we do not have a release of information from you.  You must contact the Provider to send the Lab results Attn: Dr Pete, phone [phone] and fax [phone].  We will send the results to the Dr when we receive them.  Pt wrote information down and verbalized understanding.

## 2025-05-09 NOTE — TELEPHONE ENCOUNTER
----- Message from Rocaelbravochemo sent at 5/9/2025 11:37 AM CDT -----  Regarding: Consult/Advisory  Contact: [phone]  Consult/Advisory Name Of Caller: Tayla Lambert  Contact Preference:  [phone] Nature of call: Pt is calling because she went to her endocrinologist and her liver enzymes were really high. She went to Dr. Humphreys office Ph# [phone]. She was trying to see if you can get her records to look at the labs.

## 2025-07-15 PROBLEM — M47.816 FACET HYPERTROPHY OF LUMBAR REGION: Status: ACTIVE | Noted: 2025-07-15

## 2025-07-15 PROBLEM — M48.061 LUMBAR FORAMINAL STENOSIS: Status: ACTIVE | Noted: 2025-07-15

## 2025-07-15 PROBLEM — M41.50 DEGENERATIVE SCOLIOSIS: Status: ACTIVE | Noted: 2025-07-15

## 2025-07-15 PROBLEM — M43.25 FUSION OF SPINE OF THORACOLUMBAR REGION: Status: ACTIVE | Noted: 2025-07-15

## 2025-08-01 ENCOUNTER — PATIENT OUTREACH (OUTPATIENT)
Dept: ADMINISTRATIVE | Facility: HOSPITAL | Age: 50
End: 2025-08-01
Payer: MEDICAID

## 2025-08-01 DIAGNOSIS — E11.9 TYPE 2 DIABETES MELLITUS WITHOUT COMPLICATION, WITHOUT LONG-TERM CURRENT USE OF INSULIN: Primary | ICD-10-CM

## 2025-08-21 ENCOUNTER — PATIENT OUTREACH (OUTPATIENT)
Dept: ADMINISTRATIVE | Facility: HOSPITAL | Age: 50
End: 2025-08-21
Payer: COMMERCIAL

## 2025-08-29 ENCOUNTER — TELEPHONE (OUTPATIENT)
Dept: HEPATOLOGY | Facility: CLINIC | Age: 50
End: 2025-08-29
Payer: COMMERCIAL